# Patient Record
Sex: MALE | Race: WHITE | Employment: OTHER | URBAN - METROPOLITAN AREA
[De-identification: names, ages, dates, MRNs, and addresses within clinical notes are randomized per-mention and may not be internally consistent; named-entity substitution may affect disease eponyms.]

---

## 2017-01-01 ENCOUNTER — INFUSION THERAPY VISIT (OUTPATIENT)
Dept: ONCOLOGY | Facility: CLINIC | Age: 82
End: 2017-01-01
Attending: INTERNAL MEDICINE
Payer: COMMERCIAL

## 2017-01-01 ENCOUNTER — APPOINTMENT (OUTPATIENT)
Dept: LAB | Facility: CLINIC | Age: 82
End: 2017-01-01
Attending: INTERNAL MEDICINE
Payer: COMMERCIAL

## 2017-01-01 ENCOUNTER — INFUSION THERAPY VISIT (OUTPATIENT)
Dept: ONCOLOGY | Facility: CLINIC | Age: 82
End: 2017-01-01
Attending: PHYSICIAN ASSISTANT
Payer: COMMERCIAL

## 2017-01-01 ENCOUNTER — TELEPHONE (OUTPATIENT)
Dept: DERMATOLOGY | Facility: CLINIC | Age: 82
End: 2017-01-01

## 2017-01-01 ENCOUNTER — TELEPHONE (OUTPATIENT)
Dept: TRANSPLANT | Facility: CLINIC | Age: 82
End: 2017-01-01

## 2017-01-01 ENCOUNTER — CARE COORDINATION (OUTPATIENT)
Dept: ONCOLOGY | Facility: CLINIC | Age: 82
End: 2017-01-01

## 2017-01-01 ENCOUNTER — ONCOLOGY VISIT (OUTPATIENT)
Dept: ONCOLOGY | Facility: CLINIC | Age: 82
End: 2017-01-01
Attending: NURSE PRACTITIONER
Payer: COMMERCIAL

## 2017-01-01 ENCOUNTER — TELEPHONE (OUTPATIENT)
Dept: ONCOLOGY | Facility: CLINIC | Age: 82
End: 2017-01-01

## 2017-01-01 ENCOUNTER — OFFICE VISIT (OUTPATIENT)
Dept: DERMATOLOGY | Facility: CLINIC | Age: 82
End: 2017-01-01

## 2017-01-01 ENCOUNTER — APPOINTMENT (OUTPATIENT)
Dept: LAB | Facility: CLINIC | Age: 82
End: 2017-01-01
Attending: PHYSICIAN ASSISTANT
Payer: COMMERCIAL

## 2017-01-01 VITALS
TEMPERATURE: 98.1 F | RESPIRATION RATE: 16 BRPM | DIASTOLIC BLOOD PRESSURE: 65 MMHG | SYSTOLIC BLOOD PRESSURE: 133 MMHG | HEART RATE: 79 BPM | BODY MASS INDEX: 23.67 KG/M2 | OXYGEN SATURATION: 95 % | WEIGHT: 146.6 LBS

## 2017-01-01 VITALS
RESPIRATION RATE: 18 BRPM | TEMPERATURE: 98.3 F | HEIGHT: 66 IN | OXYGEN SATURATION: 94 % | HEART RATE: 91 BPM | DIASTOLIC BLOOD PRESSURE: 61 MMHG | BODY MASS INDEX: 23.74 KG/M2 | SYSTOLIC BLOOD PRESSURE: 142 MMHG | WEIGHT: 147.71 LBS

## 2017-01-01 VITALS
HEIGHT: 66 IN | HEART RATE: 64 BPM | BODY MASS INDEX: 23.88 KG/M2 | WEIGHT: 148.6 LBS | OXYGEN SATURATION: 97 % | DIASTOLIC BLOOD PRESSURE: 65 MMHG | TEMPERATURE: 97.9 F | SYSTOLIC BLOOD PRESSURE: 139 MMHG

## 2017-01-01 VITALS
TEMPERATURE: 98.7 F | HEART RATE: 74 BPM | DIASTOLIC BLOOD PRESSURE: 70 MMHG | BODY MASS INDEX: 23.98 KG/M2 | RESPIRATION RATE: 18 BRPM | SYSTOLIC BLOOD PRESSURE: 147 MMHG | OXYGEN SATURATION: 98 % | WEIGHT: 148.5 LBS

## 2017-01-01 VITALS
OXYGEN SATURATION: 98 % | SYSTOLIC BLOOD PRESSURE: 159 MMHG | WEIGHT: 149.1 LBS | TEMPERATURE: 98.5 F | DIASTOLIC BLOOD PRESSURE: 79 MMHG | HEART RATE: 95 BPM | BODY MASS INDEX: 23.96 KG/M2 | RESPIRATION RATE: 16 BRPM | HEIGHT: 66 IN

## 2017-01-01 VITALS
WEIGHT: 149 LBS | DIASTOLIC BLOOD PRESSURE: 59 MMHG | BODY MASS INDEX: 23.95 KG/M2 | OXYGEN SATURATION: 97 % | SYSTOLIC BLOOD PRESSURE: 120 MMHG | HEIGHT: 66 IN | HEART RATE: 63 BPM | RESPIRATION RATE: 12 BRPM | TEMPERATURE: 98.6 F

## 2017-01-01 VITALS
OXYGEN SATURATION: 98 % | WEIGHT: 147.9 LBS | SYSTOLIC BLOOD PRESSURE: 133 MMHG | TEMPERATURE: 98.2 F | DIASTOLIC BLOOD PRESSURE: 79 MMHG | HEART RATE: 70 BPM | HEIGHT: 66 IN | BODY MASS INDEX: 23.77 KG/M2 | RESPIRATION RATE: 16 BRPM

## 2017-01-01 VITALS — HEART RATE: 79 BPM | DIASTOLIC BLOOD PRESSURE: 70 MMHG | SYSTOLIC BLOOD PRESSURE: 124 MMHG

## 2017-01-01 VITALS
RESPIRATION RATE: 16 BRPM | OXYGEN SATURATION: 95 % | BODY MASS INDEX: 24.14 KG/M2 | SYSTOLIC BLOOD PRESSURE: 150 MMHG | DIASTOLIC BLOOD PRESSURE: 70 MMHG | HEART RATE: 80 BPM | WEIGHT: 149.5 LBS | TEMPERATURE: 99.4 F

## 2017-01-01 DIAGNOSIS — C84.70 ANAPLASTIC ALK-NEGATIVE LARGE CELL LYMPHOMA, UNSPECIFIED BODY REGION (H): Primary | ICD-10-CM

## 2017-01-01 DIAGNOSIS — C85.99 EXTRANODAL LYMPHOMA (H): Primary | ICD-10-CM

## 2017-01-01 DIAGNOSIS — C85.99 EXTRANODAL LYMPHOMA (H): ICD-10-CM

## 2017-01-01 DIAGNOSIS — C84.00 MYCOSIS FUNGOIDES, UNSPECIFIED BODY REGION (H): Primary | ICD-10-CM

## 2017-01-01 DIAGNOSIS — I49.9 IRREGULAR HEARTBEAT: ICD-10-CM

## 2017-01-01 DIAGNOSIS — D48.5 NEOPLASM OF UNCERTAIN BEHAVIOR OF SKIN: Primary | ICD-10-CM

## 2017-01-01 DIAGNOSIS — C84.A8 CUTANEOUS T-CELL LYMPHOMA INVOLVING LYMPH NODES OF MULTIPLE REGIONS (H): ICD-10-CM

## 2017-01-01 LAB
ALBUMIN SERPL ELPH-MCNC: 4.1 G/DL (ref 3.7–5.1)
ALBUMIN SERPL-MCNC: 3.4 G/DL (ref 3.4–5)
ALBUMIN SERPL-MCNC: 3.5 G/DL (ref 3.4–5)
ALBUMIN SERPL-MCNC: 3.6 G/DL (ref 3.4–5)
ALP SERPL-CCNC: 68 U/L (ref 40–150)
ALP SERPL-CCNC: 68 U/L (ref 40–150)
ALP SERPL-CCNC: 70 U/L (ref 40–150)
ALPHA1 GLOB SERPL ELPH-MCNC: 0.3 G/DL (ref 0.2–0.4)
ALPHA2 GLOB SERPL ELPH-MCNC: 0.7 G/DL (ref 0.5–0.9)
ALT SERPL W P-5'-P-CCNC: 18 U/L (ref 0–70)
ALT SERPL W P-5'-P-CCNC: 19 U/L (ref 0–70)
ALT SERPL W P-5'-P-CCNC: 21 U/L (ref 0–70)
ANION GAP SERPL CALCULATED.3IONS-SCNC: 11 MMOL/L (ref 3–14)
ANION GAP SERPL CALCULATED.3IONS-SCNC: 5 MMOL/L (ref 3–14)
ANION GAP SERPL CALCULATED.3IONS-SCNC: 6 MMOL/L (ref 3–14)
ANION GAP SERPL CALCULATED.3IONS-SCNC: 7 MMOL/L (ref 3–14)
ANION GAP SERPL CALCULATED.3IONS-SCNC: 7 MMOL/L (ref 3–14)
ANION GAP SERPL CALCULATED.3IONS-SCNC: 8 MMOL/L (ref 3–14)
ANION GAP SERPL CALCULATED.3IONS-SCNC: 8 MMOL/L (ref 3–14)
ANION GAP SERPL CALCULATED.3IONS-SCNC: 9 MMOL/L (ref 3–14)
ANION GAP SERPL CALCULATED.3IONS-SCNC: 9 MMOL/L (ref 3–14)
AST SERPL W P-5'-P-CCNC: 14 U/L (ref 0–45)
AST SERPL W P-5'-P-CCNC: 15 U/L (ref 0–45)
AST SERPL W P-5'-P-CCNC: 16 U/L (ref 0–45)
B-GLOBULIN SERPL ELPH-MCNC: 0.7 G/DL (ref 0.6–1)
BASOPHILS # BLD AUTO: 0 10E9/L (ref 0–0.2)
BASOPHILS # BLD AUTO: 0.1 10E9/L (ref 0–0.2)
BASOPHILS NFR BLD AUTO: 0.3 %
BASOPHILS NFR BLD AUTO: 0.3 %
BASOPHILS NFR BLD AUTO: 0.4 %
BASOPHILS NFR BLD AUTO: 0.5 %
BASOPHILS NFR BLD AUTO: 0.5 %
BASOPHILS NFR BLD AUTO: 0.6 %
BASOPHILS NFR BLD AUTO: 1.3 %
BILIRUB SERPL-MCNC: 0.5 MG/DL (ref 0.2–1.3)
BILIRUB SERPL-MCNC: 0.6 MG/DL (ref 0.2–1.3)
BILIRUB SERPL-MCNC: 0.6 MG/DL (ref 0.2–1.3)
BUN SERPL-MCNC: 11 MG/DL (ref 7–30)
BUN SERPL-MCNC: 14 MG/DL (ref 7–30)
BUN SERPL-MCNC: 16 MG/DL (ref 7–30)
BUN SERPL-MCNC: 17 MG/DL (ref 7–30)
BUN SERPL-MCNC: 21 MG/DL (ref 7–30)
BUN SERPL-MCNC: 22 MG/DL (ref 7–30)
CALCIUM SERPL-MCNC: 8.4 MG/DL (ref 8.5–10.1)
CALCIUM SERPL-MCNC: 8.4 MG/DL (ref 8.5–10.1)
CALCIUM SERPL-MCNC: 8.5 MG/DL (ref 8.5–10.1)
CALCIUM SERPL-MCNC: 8.6 MG/DL (ref 8.5–10.1)
CALCIUM SERPL-MCNC: 8.7 MG/DL (ref 8.5–10.1)
CALCIUM SERPL-MCNC: 8.8 MG/DL (ref 8.5–10.1)
CALCIUM SERPL-MCNC: 8.9 MG/DL (ref 8.5–10.1)
CHLORIDE SERPL-SCNC: 104 MMOL/L (ref 94–109)
CHLORIDE SERPL-SCNC: 104 MMOL/L (ref 94–109)
CHLORIDE SERPL-SCNC: 106 MMOL/L (ref 94–109)
CHLORIDE SERPL-SCNC: 107 MMOL/L (ref 94–109)
CHLORIDE SERPL-SCNC: 108 MMOL/L (ref 94–109)
CHLORIDE SERPL-SCNC: 108 MMOL/L (ref 94–109)
CHLORIDE SERPL-SCNC: 109 MMOL/L (ref 94–109)
CO2 SERPL-SCNC: 24 MMOL/L (ref 20–32)
CO2 SERPL-SCNC: 24 MMOL/L (ref 20–32)
CO2 SERPL-SCNC: 25 MMOL/L (ref 20–32)
CO2 SERPL-SCNC: 25 MMOL/L (ref 20–32)
CO2 SERPL-SCNC: 26 MMOL/L (ref 20–32)
CO2 SERPL-SCNC: 26 MMOL/L (ref 20–32)
CO2 SERPL-SCNC: 27 MMOL/L (ref 20–32)
CO2 SERPL-SCNC: 27 MMOL/L (ref 20–32)
CO2 SERPL-SCNC: 30 MMOL/L (ref 20–32)
COPATH REPORT: NORMAL
COPATH REPORT: NORMAL
CREAT SERPL-MCNC: 0.84 MG/DL (ref 0.66–1.25)
CREAT SERPL-MCNC: 0.89 MG/DL (ref 0.66–1.25)
CREAT SERPL-MCNC: 0.89 MG/DL (ref 0.66–1.25)
CREAT SERPL-MCNC: 0.92 MG/DL (ref 0.66–1.25)
CREAT SERPL-MCNC: 0.93 MG/DL (ref 0.66–1.25)
CREAT SERPL-MCNC: 0.97 MG/DL (ref 0.66–1.25)
CREAT SERPL-MCNC: 0.98 MG/DL (ref 0.66–1.25)
CREAT SERPL-MCNC: 0.98 MG/DL (ref 0.66–1.25)
CREAT SERPL-MCNC: 1.01 MG/DL (ref 0.66–1.25)
DIFFERENTIAL METHOD BLD: ABNORMAL
DIFFERENTIAL METHOD BLD: NORMAL
EOSINOPHIL # BLD AUTO: 0 10E9/L (ref 0–0.7)
EOSINOPHIL # BLD AUTO: 0.1 10E9/L (ref 0–0.7)
EOSINOPHIL # BLD AUTO: 0.2 10E9/L (ref 0–0.7)
EOSINOPHIL # BLD AUTO: 0.3 10E9/L (ref 0–0.7)
EOSINOPHIL NFR BLD AUTO: 0.3 %
EOSINOPHIL NFR BLD AUTO: 0.3 %
EOSINOPHIL NFR BLD AUTO: 0.4 %
EOSINOPHIL NFR BLD AUTO: 0.5 %
EOSINOPHIL NFR BLD AUTO: 0.6 %
EOSINOPHIL NFR BLD AUTO: 1 %
EOSINOPHIL NFR BLD AUTO: 1 %
EOSINOPHIL NFR BLD AUTO: 2.6 %
EOSINOPHIL NFR BLD AUTO: 2.9 %
ERYTHROCYTE [DISTWIDTH] IN BLOOD BY AUTOMATED COUNT: 14.7 % (ref 10–15)
ERYTHROCYTE [DISTWIDTH] IN BLOOD BY AUTOMATED COUNT: 15.4 % (ref 10–15)
ERYTHROCYTE [DISTWIDTH] IN BLOOD BY AUTOMATED COUNT: 15.7 % (ref 10–15)
ERYTHROCYTE [DISTWIDTH] IN BLOOD BY AUTOMATED COUNT: 15.8 % (ref 10–15)
ERYTHROCYTE [DISTWIDTH] IN BLOOD BY AUTOMATED COUNT: 16 % (ref 10–15)
ERYTHROCYTE [DISTWIDTH] IN BLOOD BY AUTOMATED COUNT: 17.4 % (ref 10–15)
ERYTHROCYTE [DISTWIDTH] IN BLOOD BY AUTOMATED COUNT: 17.6 % (ref 10–15)
ERYTHROCYTE [DISTWIDTH] IN BLOOD BY AUTOMATED COUNT: 17.8 % (ref 10–15)
ERYTHROCYTE [DISTWIDTH] IN BLOOD BY AUTOMATED COUNT: 18.4 % (ref 10–15)
GAMMA GLOB SERPL ELPH-MCNC: 1 G/DL (ref 0.7–1.6)
GFR SERPL CREATININE-BSD FRML MDRD: 70 ML/MIN/1.7M2
GFR SERPL CREATININE-BSD FRML MDRD: 72 ML/MIN/1.7M2
GFR SERPL CREATININE-BSD FRML MDRD: 72 ML/MIN/1.7M2
GFR SERPL CREATININE-BSD FRML MDRD: 73 ML/MIN/1.7M2
GFR SERPL CREATININE-BSD FRML MDRD: 77 ML/MIN/1.7M2
GFR SERPL CREATININE-BSD FRML MDRD: 77 ML/MIN/1.7M2
GFR SERPL CREATININE-BSD FRML MDRD: 81 ML/MIN/1.7M2
GFR SERPL CREATININE-BSD FRML MDRD: 81 ML/MIN/1.7M2
GFR SERPL CREATININE-BSD FRML MDRD: 86 ML/MIN/1.7M2
GLUCOSE SERPL-MCNC: 104 MG/DL (ref 70–99)
GLUCOSE SERPL-MCNC: 123 MG/DL (ref 70–99)
GLUCOSE SERPL-MCNC: 130 MG/DL (ref 70–99)
GLUCOSE SERPL-MCNC: 134 MG/DL (ref 70–99)
GLUCOSE SERPL-MCNC: 138 MG/DL (ref 70–99)
GLUCOSE SERPL-MCNC: 170 MG/DL (ref 70–99)
GLUCOSE SERPL-MCNC: 176 MG/DL (ref 70–99)
GLUCOSE SERPL-MCNC: 177 MG/DL (ref 70–99)
GLUCOSE SERPL-MCNC: 199 MG/DL (ref 70–99)
HCT VFR BLD AUTO: 38.1 % (ref 40–53)
HCT VFR BLD AUTO: 39.4 % (ref 40–53)
HCT VFR BLD AUTO: 39.6 % (ref 40–53)
HCT VFR BLD AUTO: 40 % (ref 40–53)
HCT VFR BLD AUTO: 40.1 % (ref 40–53)
HCT VFR BLD AUTO: 40.3 % (ref 40–53)
HCT VFR BLD AUTO: 40.5 % (ref 40–53)
HCT VFR BLD AUTO: 41.5 % (ref 40–53)
HCT VFR BLD AUTO: 42.7 % (ref 40–53)
HGB BLD-MCNC: 12.1 G/DL (ref 13.3–17.7)
HGB BLD-MCNC: 12.7 G/DL (ref 13.3–17.7)
HGB BLD-MCNC: 13 G/DL (ref 13.3–17.7)
HGB BLD-MCNC: 13.1 G/DL (ref 13.3–17.7)
HGB BLD-MCNC: 13.4 G/DL (ref 13.3–17.7)
HGB BLD-MCNC: 13.4 G/DL (ref 13.3–17.7)
HGB BLD-MCNC: 13.7 G/DL (ref 13.3–17.7)
HGB BLD-MCNC: 14.2 G/DL (ref 13.3–17.7)
HGB BLD-MCNC: 14.3 G/DL (ref 13.3–17.7)
IMM GRANULOCYTES # BLD: 0 10E9/L (ref 0–0.4)
IMM GRANULOCYTES # BLD: 0.1 10E9/L (ref 0–0.4)
IMM GRANULOCYTES NFR BLD: 0.1 %
IMM GRANULOCYTES NFR BLD: 0.2 %
IMM GRANULOCYTES NFR BLD: 0.3 %
IMM GRANULOCYTES NFR BLD: 0.4 %
IMM GRANULOCYTES NFR BLD: 0.8 %
INTERPRETATION ECG - MUSE: NORMAL
LDH SERPL L TO P-CCNC: 205 U/L (ref 85–227)
LDH SERPL L TO P-CCNC: 208 U/L (ref 85–227)
LYMPHOCYTES # BLD AUTO: 0.8 10E9/L (ref 0.8–5.3)
LYMPHOCYTES # BLD AUTO: 0.8 10E9/L (ref 0.8–5.3)
LYMPHOCYTES # BLD AUTO: 0.9 10E9/L (ref 0.8–5.3)
LYMPHOCYTES # BLD AUTO: 0.9 10E9/L (ref 0.8–5.3)
LYMPHOCYTES # BLD AUTO: 1 10E9/L (ref 0.8–5.3)
LYMPHOCYTES # BLD AUTO: 1 10E9/L (ref 0.8–5.3)
LYMPHOCYTES # BLD AUTO: 1.3 10E9/L (ref 0.8–5.3)
LYMPHOCYTES # BLD AUTO: 1.3 10E9/L (ref 0.8–5.3)
LYMPHOCYTES # BLD AUTO: 1.4 10E9/L (ref 0.8–5.3)
LYMPHOCYTES NFR BLD AUTO: 10.4 %
LYMPHOCYTES NFR BLD AUTO: 11 %
LYMPHOCYTES NFR BLD AUTO: 13.8 %
LYMPHOCYTES NFR BLD AUTO: 14 %
LYMPHOCYTES NFR BLD AUTO: 20.3 %
LYMPHOCYTES NFR BLD AUTO: 9.6 %
LYMPHOCYTES NFR BLD AUTO: 9.7 %
LYMPHOCYTES NFR BLD AUTO: 9.9 %
LYMPHOCYTES NFR BLD AUTO: 9.9 %
M PROTEIN SERPL ELPH-MCNC: 0 G/DL
MAGNESIUM SERPL-MCNC: 2.5 MG/DL (ref 1.6–2.3)
MCH RBC QN AUTO: 28.1 PG (ref 26.5–33)
MCH RBC QN AUTO: 28.3 PG (ref 26.5–33)
MCH RBC QN AUTO: 28.6 PG (ref 26.5–33)
MCH RBC QN AUTO: 28.7 PG (ref 26.5–33)
MCH RBC QN AUTO: 29.3 PG (ref 26.5–33)
MCH RBC QN AUTO: 29.6 PG (ref 26.5–33)
MCH RBC QN AUTO: 30 PG (ref 26.5–33)
MCH RBC QN AUTO: 30.1 PG (ref 26.5–33)
MCH RBC QN AUTO: 30.6 PG (ref 26.5–33)
MCHC RBC AUTO-ENTMCNC: 31.8 G/DL (ref 31.5–36.5)
MCHC RBC AUTO-ENTMCNC: 32.2 G/DL (ref 31.5–36.5)
MCHC RBC AUTO-ENTMCNC: 32.4 G/DL (ref 31.5–36.5)
MCHC RBC AUTO-ENTMCNC: 32.5 G/DL (ref 31.5–36.5)
MCHC RBC AUTO-ENTMCNC: 33.3 G/DL (ref 31.5–36.5)
MCHC RBC AUTO-ENTMCNC: 33.5 G/DL (ref 31.5–36.5)
MCHC RBC AUTO-ENTMCNC: 33.8 G/DL (ref 31.5–36.5)
MCHC RBC AUTO-ENTMCNC: 33.8 G/DL (ref 31.5–36.5)
MCHC RBC AUTO-ENTMCNC: 34.5 G/DL (ref 31.5–36.5)
MCV RBC AUTO: 87 FL (ref 78–100)
MCV RBC AUTO: 88 FL (ref 78–100)
MCV RBC AUTO: 89 FL (ref 78–100)
MONOCYTES # BLD AUTO: 0.5 10E9/L (ref 0–1.3)
MONOCYTES # BLD AUTO: 0.6 10E9/L (ref 0–1.3)
MONOCYTES # BLD AUTO: 0.6 10E9/L (ref 0–1.3)
MONOCYTES # BLD AUTO: 0.7 10E9/L (ref 0–1.3)
MONOCYTES NFR BLD AUTO: 5.3 %
MONOCYTES NFR BLD AUTO: 5.8 %
MONOCYTES NFR BLD AUTO: 6.3 %
MONOCYTES NFR BLD AUTO: 6.8 %
MONOCYTES NFR BLD AUTO: 6.9 %
MONOCYTES NFR BLD AUTO: 7.8 %
MONOCYTES NFR BLD AUTO: 7.8 %
MONOCYTES NFR BLD AUTO: 8.7 %
MONOCYTES NFR BLD AUTO: 8.9 %
NEUTROPHILS # BLD AUTO: 4.9 10E9/L (ref 1.6–8.3)
NEUTROPHILS # BLD AUTO: 5.4 10E9/L (ref 1.6–8.3)
NEUTROPHILS # BLD AUTO: 6.4 10E9/L (ref 1.6–8.3)
NEUTROPHILS # BLD AUTO: 6.5 10E9/L (ref 1.6–8.3)
NEUTROPHILS # BLD AUTO: 7.2 10E9/L (ref 1.6–8.3)
NEUTROPHILS # BLD AUTO: 7.3 10E9/L (ref 1.6–8.3)
NEUTROPHILS # BLD AUTO: 7.9 10E9/L (ref 1.6–8.3)
NEUTROPHILS # BLD AUTO: 8 10E9/L (ref 1.6–8.3)
NEUTROPHILS # BLD AUTO: 9.4 10E9/L (ref 1.6–8.3)
NEUTROPHILS NFR BLD AUTO: 69.4 %
NEUTROPHILS NFR BLD AUTO: 72.4 %
NEUTROPHILS NFR BLD AUTO: 76.7 %
NEUTROPHILS NFR BLD AUTO: 79.9 %
NEUTROPHILS NFR BLD AUTO: 81.8 %
NEUTROPHILS NFR BLD AUTO: 81.8 %
NEUTROPHILS NFR BLD AUTO: 82.4 %
NEUTROPHILS NFR BLD AUTO: 82.4 %
NEUTROPHILS NFR BLD AUTO: 83.8 %
NRBC # BLD AUTO: 0 10*3/UL
NRBC BLD AUTO-RTO: 0 /100
PHOSPHATE SERPL-MCNC: 2.3 MG/DL (ref 2.5–4.5)
PLATELET # BLD AUTO: 185 10E9/L (ref 150–450)
PLATELET # BLD AUTO: 196 10E9/L (ref 150–450)
PLATELET # BLD AUTO: 202 10E9/L (ref 150–450)
PLATELET # BLD AUTO: 211 10E9/L (ref 150–450)
PLATELET # BLD AUTO: 214 10E9/L (ref 150–450)
PLATELET # BLD AUTO: 255 10E9/L (ref 150–450)
PLATELET # BLD AUTO: 282 10E9/L (ref 150–450)
PLATELET # BLD AUTO: 353 10E9/L (ref 150–450)
PLATELET # BLD AUTO: 367 10E9/L (ref 150–450)
POTASSIUM SERPL-SCNC: 3.7 MMOL/L (ref 3.4–5.3)
POTASSIUM SERPL-SCNC: 3.7 MMOL/L (ref 3.4–5.3)
POTASSIUM SERPL-SCNC: 3.8 MMOL/L (ref 3.4–5.3)
POTASSIUM SERPL-SCNC: 3.8 MMOL/L (ref 3.4–5.3)
POTASSIUM SERPL-SCNC: 4 MMOL/L (ref 3.4–5.3)
POTASSIUM SERPL-SCNC: 4 MMOL/L (ref 3.4–5.3)
POTASSIUM SERPL-SCNC: 4.1 MMOL/L (ref 3.4–5.3)
POTASSIUM SERPL-SCNC: 4.2 MMOL/L (ref 3.4–5.3)
POTASSIUM SERPL-SCNC: 4.4 MMOL/L (ref 3.4–5.3)
PROT PATTERN SERPL ELPH-IMP: NORMAL
PROT SERPL-MCNC: 6.6 G/DL (ref 6.8–8.8)
PROT SERPL-MCNC: 6.9 G/DL (ref 6.8–8.8)
PROT SERPL-MCNC: 7.1 G/DL (ref 6.8–8.8)
RBC # BLD AUTO: 4.3 10E12/L (ref 4.4–5.9)
RBC # BLD AUTO: 4.47 10E12/L (ref 4.4–5.9)
RBC # BLD AUTO: 4.49 10E12/L (ref 4.4–5.9)
RBC # BLD AUTO: 4.55 10E12/L (ref 4.4–5.9)
RBC # BLD AUTO: 4.55 10E12/L (ref 4.4–5.9)
RBC # BLD AUTO: 4.56 10E12/L (ref 4.4–5.9)
RBC # BLD AUTO: 4.58 10E12/L (ref 4.4–5.9)
RBC # BLD AUTO: 4.67 10E12/L (ref 4.4–5.9)
RBC # BLD AUTO: 4.79 10E12/L (ref 4.4–5.9)
SODIUM SERPL-SCNC: 136 MMOL/L (ref 133–144)
SODIUM SERPL-SCNC: 138 MMOL/L (ref 133–144)
SODIUM SERPL-SCNC: 140 MMOL/L (ref 133–144)
SODIUM SERPL-SCNC: 140 MMOL/L (ref 133–144)
SODIUM SERPL-SCNC: 142 MMOL/L (ref 133–144)
SODIUM SERPL-SCNC: 143 MMOL/L (ref 133–144)
URATE SERPL-MCNC: 3 MG/DL (ref 3.5–7.2)
URATE SERPL-MCNC: 3.8 MG/DL (ref 3.5–7.2)
WBC # BLD AUTO: 11.8 10E9/L (ref 4–11)
WBC # BLD AUTO: 7 10E9/L (ref 4–11)
WBC # BLD AUTO: 7.4 10E9/L (ref 4–11)
WBC # BLD AUTO: 7.8 10E9/L (ref 4–11)
WBC # BLD AUTO: 7.9 10E9/L (ref 4–11)
WBC # BLD AUTO: 8.9 10E9/L (ref 4–11)
WBC # BLD AUTO: 9.4 10E9/L (ref 4–11)
WBC # BLD AUTO: 9.4 10E9/L (ref 4–11)
WBC # BLD AUTO: 9.7 10E9/L (ref 4–11)

## 2017-01-01 PROCEDURE — 00000159 ZZHCL STATISTIC H-SEND OUTS PREP: Performed by: DERMATOLOGY

## 2017-01-01 PROCEDURE — 99212 OFFICE O/P EST SF 10 MIN: CPT | Mod: ZF

## 2017-01-01 PROCEDURE — 93005 ELECTROCARDIOGRAM TRACING: CPT | Mod: ZF

## 2017-01-01 PROCEDURE — 99214 OFFICE O/P EST MOD 30 MIN: CPT | Mod: GC | Performed by: INTERNAL MEDICINE

## 2017-01-01 PROCEDURE — 83735 ASSAY OF MAGNESIUM: CPT | Performed by: INTERNAL MEDICINE

## 2017-01-01 PROCEDURE — 85025 COMPLETE CBC W/AUTO DIFF WBC: CPT | Performed by: PHYSICIAN ASSISTANT

## 2017-01-01 PROCEDURE — 36415 COLL VENOUS BLD VENIPUNCTURE: CPT | Performed by: INTERNAL MEDICINE

## 2017-01-01 PROCEDURE — 36415 COLL VENOUS BLD VENIPUNCTURE: CPT

## 2017-01-01 PROCEDURE — 85025 COMPLETE CBC W/AUTO DIFF WBC: CPT | Performed by: INTERNAL MEDICINE

## 2017-01-01 PROCEDURE — 96413 CHEMO IV INFUSION 1 HR: CPT

## 2017-01-01 PROCEDURE — 99213 OFFICE O/P EST LOW 20 MIN: CPT | Mod: ZF

## 2017-01-01 PROCEDURE — 84100 ASSAY OF PHOSPHORUS: CPT | Performed by: INTERNAL MEDICINE

## 2017-01-01 PROCEDURE — 25000128 H RX IP 250 OP 636: Mod: JW,ZF | Performed by: INTERNAL MEDICINE

## 2017-01-01 PROCEDURE — 84165 PROTEIN E-PHORESIS SERUM: CPT | Performed by: INTERNAL MEDICINE

## 2017-01-01 PROCEDURE — 99212 OFFICE O/P EST SF 10 MIN: CPT

## 2017-01-01 PROCEDURE — 99215 OFFICE O/P EST HI 40 MIN: CPT | Mod: ZP | Performed by: INTERNAL MEDICINE

## 2017-01-01 PROCEDURE — 83615 LACTATE (LD) (LDH) ENZYME: CPT | Performed by: INTERNAL MEDICINE

## 2017-01-01 PROCEDURE — 80048 BASIC METABOLIC PNL TOTAL CA: CPT | Performed by: PHYSICIAN ASSISTANT

## 2017-01-01 PROCEDURE — 99214 OFFICE O/P EST MOD 30 MIN: CPT | Mod: ZP | Performed by: PHYSICIAN ASSISTANT

## 2017-01-01 PROCEDURE — 99214 OFFICE O/P EST MOD 30 MIN: CPT | Mod: ZP | Performed by: NURSE PRACTITIONER

## 2017-01-01 PROCEDURE — 25000128 H RX IP 250 OP 636: Mod: ZF | Performed by: NURSE PRACTITIONER

## 2017-01-01 PROCEDURE — 99213 OFFICE O/P EST LOW 20 MIN: CPT | Mod: ZP | Performed by: INTERNAL MEDICINE

## 2017-01-01 PROCEDURE — 80053 COMPREHEN METABOLIC PANEL: CPT | Performed by: INTERNAL MEDICINE

## 2017-01-01 PROCEDURE — 80048 BASIC METABOLIC PNL TOTAL CA: CPT | Performed by: INTERNAL MEDICINE

## 2017-01-01 PROCEDURE — 99214 OFFICE O/P EST MOD 30 MIN: CPT | Mod: 25

## 2017-01-01 PROCEDURE — 84550 ASSAY OF BLOOD/URIC ACID: CPT | Performed by: INTERNAL MEDICINE

## 2017-01-01 PROCEDURE — 25000128 H RX IP 250 OP 636: Mod: JW,ZF | Performed by: PHYSICIAN ASSISTANT

## 2017-01-01 PROCEDURE — 85025 COMPLETE CBC W/AUTO DIFF WBC: CPT | Performed by: FAMILY MEDICINE

## 2017-01-01 PROCEDURE — 25000128 H RX IP 250 OP 636: Mod: ZF | Performed by: INTERNAL MEDICINE

## 2017-01-01 PROCEDURE — 93010 ELECTROCARDIOGRAM REPORT: CPT | Mod: ZP | Performed by: INTERNAL MEDICINE

## 2017-01-01 PROCEDURE — 88342 IMHCHEM/IMCYTCHM 1ST ANTB: CPT | Performed by: DERMATOLOGY

## 2017-01-01 PROCEDURE — 36415 COLL VENOUS BLD VENIPUNCTURE: CPT | Performed by: PHYSICIAN ASSISTANT

## 2017-01-01 PROCEDURE — 88305 TISSUE EXAM BY PATHOLOGIST: CPT | Performed by: DERMATOLOGY

## 2017-01-01 PROCEDURE — 88341 IMHCHEM/IMCYTCHM EA ADD ANTB: CPT | Performed by: DERMATOLOGY

## 2017-01-01 PROCEDURE — 81342 TRG GENE REARRANGEMENT ANAL: CPT | Performed by: DERMATOLOGY

## 2017-01-01 PROCEDURE — 99214 OFFICE O/P EST MOD 30 MIN: CPT | Mod: ZP | Performed by: INTERNAL MEDICINE

## 2017-01-01 PROCEDURE — 00000402 ZZHCL STATISTIC TOTAL PROTEIN: Performed by: INTERNAL MEDICINE

## 2017-01-01 RX ORDER — EPINEPHRINE 0.3 MG/.3ML
0.3 INJECTION SUBCUTANEOUS EVERY 5 MIN PRN
Status: CANCELLED | OUTPATIENT
Start: 2017-01-01

## 2017-01-01 RX ORDER — DIPHENHYDRAMINE HCL 25 MG
50 CAPSULE ORAL
Status: CANCELLED
Start: 2017-01-01

## 2017-01-01 RX ORDER — VENLAFAXINE HYDROCHLORIDE 37.5 MG/1
CAPSULE, EXTENDED RELEASE ORAL
COMMUNITY
Start: 2017-01-01

## 2017-01-01 RX ORDER — ACETAMINOPHEN 325 MG/1
650 TABLET ORAL
Status: CANCELLED
Start: 2017-01-01

## 2017-01-01 RX ORDER — ALBUTEROL SULFATE 0.83 MG/ML
2.5 SOLUTION RESPIRATORY (INHALATION)
Status: CANCELLED | OUTPATIENT
Start: 2017-01-01

## 2017-01-01 RX ORDER — METHYLPREDNISOLONE SODIUM SUCCINATE 125 MG/2ML
125 INJECTION, POWDER, LYOPHILIZED, FOR SOLUTION INTRAMUSCULAR; INTRAVENOUS
Status: CANCELLED
Start: 2017-01-01

## 2017-01-01 RX ORDER — ALBUTEROL SULFATE 90 UG/1
1-2 AEROSOL, METERED RESPIRATORY (INHALATION)
Status: CANCELLED
Start: 2017-01-01

## 2017-01-01 RX ORDER — MEPERIDINE HYDROCHLORIDE 25 MG/ML
25 INJECTION INTRAMUSCULAR; INTRAVENOUS; SUBCUTANEOUS EVERY 30 MIN PRN
Status: CANCELLED | OUTPATIENT
Start: 2017-01-01

## 2017-01-01 RX ORDER — LORAZEPAM 2 MG/ML
0.5 INJECTION INTRAMUSCULAR EVERY 4 HOURS PRN
Status: CANCELLED
Start: 2017-01-01

## 2017-01-01 RX ORDER — DIPHENHYDRAMINE HYDROCHLORIDE 50 MG/ML
50 INJECTION INTRAMUSCULAR; INTRAVENOUS
Status: CANCELLED
Start: 2017-01-01

## 2017-01-01 RX ORDER — SODIUM CHLORIDE 9 MG/ML
1000 INJECTION, SOLUTION INTRAVENOUS CONTINUOUS PRN
Status: CANCELLED
Start: 2017-01-01

## 2017-01-01 RX ORDER — EPINEPHRINE 1 MG/ML
0.3 INJECTION INTRAMUSCULAR; INTRAVENOUS; SUBCUTANEOUS EVERY 5 MIN PRN
Status: CANCELLED | OUTPATIENT
Start: 2017-01-01

## 2017-01-01 RX ORDER — FLUOCINONIDE 0.5 MG/G
CREAM TOPICAL 2 TIMES DAILY
Qty: 120 G | Refills: 5 | Status: SHIPPED | OUTPATIENT
Start: 2017-01-01

## 2017-01-01 RX ADMIN — SODIUM CHLORIDE 250 ML: 9 INJECTION, SOLUTION INTRAVENOUS at 16:22

## 2017-01-01 RX ADMIN — SODIUM CHLORIDE 250 ML: 9 INJECTION, SOLUTION INTRAVENOUS at 15:45

## 2017-01-01 RX ADMIN — SODIUM CHLORIDE 250 ML: 9 INJECTION, SOLUTION INTRAVENOUS at 15:11

## 2017-01-01 RX ADMIN — BRENTUXIMAB VEDOTIN 123 MG: 50 INJECTION, POWDER, LYOPHILIZED, FOR SOLUTION INTRAVENOUS at 15:27

## 2017-01-01 RX ADMIN — SODIUM CHLORIDE 250 ML: 9 INJECTION, SOLUTION INTRAVENOUS at 15:28

## 2017-01-01 RX ADMIN — BRENTUXIMAB VEDOTIN 123 MG: 50 INJECTION, POWDER, LYOPHILIZED, FOR SOLUTION INTRAVENOUS at 15:20

## 2017-01-01 RX ADMIN — BRENTUXIMAB VEDOTIN 123 MG: 50 INJECTION, POWDER, LYOPHILIZED, FOR SOLUTION INTRAVENOUS at 15:28

## 2017-01-01 RX ADMIN — BRENTUXIMAB VEDOTIN 123 MG: 50 INJECTION, POWDER, LYOPHILIZED, FOR SOLUTION INTRAVENOUS at 15:45

## 2017-01-01 RX ADMIN — BRENTUXIMAB VEDOTIN 123 MG: 50 INJECTION, POWDER, LYOPHILIZED, FOR SOLUTION INTRAVENOUS at 16:22

## 2017-01-01 RX ADMIN — SODIUM CHLORIDE 250 ML: 9 INJECTION, SOLUTION INTRAVENOUS at 15:20

## 2017-01-01 ASSESSMENT — PAIN SCALES - GENERAL
PAINLEVEL: MODERATE PAIN (4)
PAINLEVEL: NO PAIN (0)

## 2017-03-16 ENCOUNTER — APPOINTMENT (OUTPATIENT)
Dept: LAB | Facility: CLINIC | Age: 82
End: 2017-03-16
Attending: INTERNAL MEDICINE
Payer: COMMERCIAL

## 2017-03-16 ENCOUNTER — ONCOLOGY VISIT (OUTPATIENT)
Dept: ONCOLOGY | Facility: CLINIC | Age: 82
End: 2017-03-16
Attending: INTERNAL MEDICINE
Payer: COMMERCIAL

## 2017-03-16 VITALS
RESPIRATION RATE: 18 BRPM | DIASTOLIC BLOOD PRESSURE: 84 MMHG | WEIGHT: 155.42 LBS | TEMPERATURE: 98.4 F | HEART RATE: 79 BPM | BODY MASS INDEX: 25.86 KG/M2 | OXYGEN SATURATION: 96 % | SYSTOLIC BLOOD PRESSURE: 158 MMHG

## 2017-03-16 DIAGNOSIS — C84.A0 CUTANEOUS T-CELL LYMPHOMA, UNSPECIFIED BODY REGION (H): ICD-10-CM

## 2017-03-16 LAB
ALBUMIN SERPL-MCNC: 3 G/DL (ref 3.4–5)
ALP SERPL-CCNC: 79 U/L (ref 40–150)
ALT SERPL W P-5'-P-CCNC: 18 U/L (ref 0–70)
ANION GAP SERPL CALCULATED.3IONS-SCNC: 8 MMOL/L (ref 3–14)
AST SERPL W P-5'-P-CCNC: 9 U/L (ref 0–45)
BASOPHILS # BLD AUTO: 0 10E9/L (ref 0–0.2)
BASOPHILS NFR BLD AUTO: 0.2 %
BILIRUB SERPL-MCNC: 0.3 MG/DL (ref 0.2–1.3)
BUN SERPL-MCNC: 16 MG/DL (ref 7–30)
CALCIUM SERPL-MCNC: 8.6 MG/DL (ref 8.5–10.1)
CHLORIDE SERPL-SCNC: 108 MMOL/L (ref 94–109)
CO2 SERPL-SCNC: 28 MMOL/L (ref 20–32)
CREAT SERPL-MCNC: 0.87 MG/DL (ref 0.66–1.25)
DIFFERENTIAL METHOD BLD: ABNORMAL
EOSINOPHIL # BLD AUTO: 0 10E9/L (ref 0–0.7)
EOSINOPHIL NFR BLD AUTO: 0.4 %
ERYTHROCYTE [DISTWIDTH] IN BLOOD BY AUTOMATED COUNT: 13.6 % (ref 10–15)
GFR SERPL CREATININE-BSD FRML MDRD: 83 ML/MIN/1.7M2
GLUCOSE SERPL-MCNC: 141 MG/DL (ref 70–99)
HCT VFR BLD AUTO: 40.3 % (ref 40–53)
HGB BLD-MCNC: 13.5 G/DL (ref 13.3–17.7)
IMM GRANULOCYTES # BLD: 0.1 10E9/L (ref 0–0.4)
IMM GRANULOCYTES NFR BLD: 0.8 %
LDH SERPL L TO P-CCNC: 173 U/L (ref 85–227)
LYMPHOCYTES # BLD AUTO: 0.9 10E9/L (ref 0.8–5.3)
LYMPHOCYTES NFR BLD AUTO: 8 %
MCH RBC QN AUTO: 29.7 PG (ref 26.5–33)
MCHC RBC AUTO-ENTMCNC: 33.5 G/DL (ref 31.5–36.5)
MCV RBC AUTO: 89 FL (ref 78–100)
MONOCYTES # BLD AUTO: 0.8 10E9/L (ref 0–1.3)
MONOCYTES NFR BLD AUTO: 7.1 %
NEUTROPHILS # BLD AUTO: 8.9 10E9/L (ref 1.6–8.3)
NEUTROPHILS NFR BLD AUTO: 83.5 %
NRBC # BLD AUTO: 0 10*3/UL
NRBC BLD AUTO-RTO: 0 /100
PLATELET # BLD AUTO: 298 10E9/L (ref 150–450)
POTASSIUM SERPL-SCNC: 3.4 MMOL/L (ref 3.4–5.3)
PROT SERPL-MCNC: 6.8 G/DL (ref 6.8–8.8)
RBC # BLD AUTO: 4.55 10E12/L (ref 4.4–5.9)
SODIUM SERPL-SCNC: 144 MMOL/L (ref 133–144)
WBC # BLD AUTO: 10.6 10E9/L (ref 4–11)

## 2017-03-16 PROCEDURE — 83615 LACTATE (LD) (LDH) ENZYME: CPT | Performed by: HOSPITALIST

## 2017-03-16 PROCEDURE — 80053 COMPREHEN METABOLIC PANEL: CPT | Performed by: HOSPITALIST

## 2017-03-16 PROCEDURE — 99214 OFFICE O/P EST MOD 30 MIN: CPT | Mod: GC | Performed by: INTERNAL MEDICINE

## 2017-03-16 PROCEDURE — 85025 COMPLETE CBC W/AUTO DIFF WBC: CPT | Performed by: HOSPITALIST

## 2017-03-16 PROCEDURE — 99212 OFFICE O/P EST SF 10 MIN: CPT | Mod: ZF

## 2017-03-16 RX ORDER — IBUPROFEN 600 MG/1
600 TABLET, FILM COATED ORAL
COMMUNITY
Start: 2012-08-30

## 2017-03-16 NOTE — MR AVS SNAPSHOT
After Visit Summary   3/16/2017    Bobby Bethea    MRN: [id number]           Patient Information     Date Of Birth          7/2/1929        Visit Information        Provider Department      3/16/2017 3:00 PM Eddie Modi MD [facility]        Today's Diagnoses     Cutaneous T-cell lymphoma, unspecified body region (H)           Follow-ups after your visit        Your next 10 appointments already scheduled     Mar 22, 2017  2:20 PM CDT   (Arrive by 2:05 PM)   CT ABDOMEN PELVIS W CONTRAST with UCCT2   [facility] [place] [facility] ([facility])    [address] [address] [phone]           Please bring any scans or X-rays taken at other hospitals, if similar tests were done. Also bring a list of your medicines, including vitamins, minerals and over-the-counter drugs. It is safest to leave personal items at home.  Be sure to tell your doctor:   If you have any allergies.   If there s any chance you are pregnant.   If you are breastfeeding.   If you have any special needs.  You may have contrast for this exam. To prepare:   Do not eat or drink for 2 hours before your exam. If you need to take medicine, you may take it with small sips of water. (We may ask you to take liquid medicine as well.)   The day before your exam, drink extra fluids at least six 8-ounce glasses (unless your doctor tells you to restrict your fluids).  Patients over 70 or patients with diabetes or kidney problems:   If you haven t had a blood test (creatinine test) within the last 30 days, go to your clinic or Diagnostic Imaging Department for this test.  If you have diabetes:   If your kidney function is normal, continue taking your metformin (Avandamet, Glucophage, Glucovance, Metaglip) on the day of your exam.   If your kidney function is abnormal, wait 48 hours before restarting this medicine.  You will have oral contrast for this exam:   You  "will drink the contrast at home. Get this from your clinic or Diagnostic Imaging Department. Please follow the directions given.  Please wear loose clothing, such as a sweat suit or jogging clothes. Avoid snaps, zippers and other metal. We may ask you to undress and put on a hospital gown.  If you have any questions, please call the Imaging Department where you will have your exam.              Future tests that were ordered for you today     Open Future Orders        Priority Expected Expires Ordered    CT Abdomen pelvis w contrast* Routine 3/20/2017 3/16/2018 3/16/2017            Who to contact     If you have questions or need follow up information about today's clinic visit or your schedule please contact [facility] directly at [phone].  Normal or non-critical lab and imaging results will be communicated to you by HealthEdgehart, letter or phone within 4 business days after the clinic has received the results. If you do not hear from us within 7 days, please contact the clinic through HealthEdgehart or phone. If you have a critical or abnormal lab result, we will notify you by phone as soon as possible.  Submit refill requests through [employer] or call your pharmacy and they will forward the refill request to us. Please allow 3 business days for your refill to be completed.          Additional Information About Your Visit        Muecs Information     Muecs lets you send messages to your doctor, view your test results, renew your prescriptions, schedule appointments and more. To sign up, go to [email or web address] . Click on \"Log in\" on the left side of the screen, which will take you to the Welcome page. Then click on \"Sign up Now\" on the right side of the page.     You will be asked to enter the access code listed below, as well as some personal information. Please follow the directions to create your username and password.     Your access code is: [email or web address]  Expires: 6/14/2017  4:43 PM   "   Your access code will  in 90 days. If you need help or a new code, please call your [place] clinic or [phone].        Care EveryWhere ID     This is your Care EveryWhere ID. This could be used by other organizations to access your [place] medical records  [id number]        Your Vitals Were     Pulse Temperature Respirations Pulse Oximetry BMI (Body Mass Index)       79 98.4  F (36.9  C) (Oral) 18 96% 25.86 kg/m2        Blood Pressure from Last 3 Encounters:   17 158/84   16 155/73   16 (!) 182/101    Weight from Last 3 Encounters:   17 70.5 kg (155 lb 6.8 oz)   16 70 kg (154 lb 5.2 oz)   16 70.2 kg (154 lb 12.8 oz)              We Performed the Following     CBC with platelets differential     Comprehensive metabolic panel     Lactate Dehydrogenase        Primary Care Provider Office Phone # Fax #    [facility] [phone]       [address] [place] [address]        Thank you!     Thank you for choosing [facility]  for your care. Our goal is always to provide you with excellent care. Hearing back from our patients is one way we can continue to improve our services. Please take a few minutes to complete the written survey that you may receive in the mail after your visit with us. Thank you!             Your Updated Medication List - Protect others around you: Learn how to safely use, store and throw away your medicines at www.disposemymeds.org.          This list is accurate as of: 3/16/17  4:43 PM.  Always use your most recent med list.                   Brand Name Dispense Instructions for use    aspirin 81 MG tablet      Take 1 tablet by mouth daily.       DAILY MULTIVITAMIN PO      Take  by mouth daily.       fluocinonide 0.05 % cream    LIDEX    120 g    Apply topically 2 times daily Apply topically twice daily as needed for itchy rash on body.       hydrocortisone 2.5 % cream     454 g    Apply   topically 2 times daily. Mix with vanicream       ibuprofen 600 MG tablet    ADVIL/MOTRIN     Take 600 mg by mouth       influenza Vac Split High-Dose 0.5 ML injection    FLUZONE     Indications: PN:   FLORESITACHOLO BOYDROS   Mon Apr 6, 2015  2:55 PM Received from: External Pharmacy       losartan 25 MG tablet    COZAAR    30 tablet    Take 1 tablet by mouth daily.       magnesium 250 MG tablet      Take 1 tablet by mouth daily.       PROSCAR 5 MG tablet   Generic drug:  finasteride      Take 5 mg by mouth daily.       TYLENOL PO      Take 325 mg by mouth.       UNKNOWN TO PATIENT      Reported on 3/16/2017       Vitamin C 500 MG Caps      Take 250 mg by mouth daily.       vitamin D 1000 UNITS capsule      Take 1 capsule by mouth daily.

## 2017-03-16 NOTE — LETTER
3/16/2017      RE: Bobby Bethea  [address] [facility] [facility] [address]         DATE OF VISIT :3/16/2017     HISTORY OF PRESENT ILLNESS :  Mr. 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  He has had 3 treatments for the extra cutaneous CD30-positive disease, including -- 12/1998 (CHOP - good response), 2012 (relapse treated with CVP - mixed response), and then 8 cycles of brentuximab vedotin completed on 05/21/2013 . He had a good clinical response including in the nodes and skin with Bretuximab therapy .  He has had no evidence of recurrent dorothea disease since brentuximab. The CTCL is being monitored and treated in the Dermatology Clinic by Dr. Portillo .    INTERVAL HISTORY : Mr. Bethea has been doing relatively well. However, since last Thursday he has noted night sweats daily which makes him change shirt at least twice. He also has noted low grade temperature 99.8,usually less than 100. He has also noted the appearance of left groin adenopathy since then.     Denies chest pain, SOB, cough, phlegmn, nausea, vomiting, abdominal pain, diarrhoea, constipation, fatigue, loss of weight, loss of appetite, urinary problems, headache, dizziness, blurry vision, fevers, chills.    REVIEW OF SYSTEMS:  A 12-point review of systems is otherwise generally negative.      ALLERGIES:  Atenolol/bee venom.       MEDICATIONS:   1.  Hydrocortisone cream (not applying regularly).   2.  Losartan.   3.  Acetaminophen p.r.n.   4.  Magnesium.   5.  Vitamin D.   6.  Aspirin 81 mg.   7.  Multivitamins.   8.  Finasteride.   9.  Vitamin C.      PHYSICAL EXAMINATION:   VITAL SIGNS:  /84  Pulse 79  Temp 98.4  F (36.9  C) (Oral)  Resp 18  Wt 70.5 kg (155 lb 6.8 oz)  SpO2 96%  BMI 25.86 kg/m2  HEENT:  Atraumatic . Normocephalic . Anicteric.  Pupils equal and reactive.  EOMs intact.  Oropharynx with no masses.   Mucosae moist, no lesions.   NECK:   Supple . No lymphadenopathy . Thyroid normal.    CHEST:  Clear to auscultation. No wheezing or crackles.   AXILLAE:  No nodes.   HEART:  Regular rhythm.  No murmur or gallop.   ABDOMEN:  Soft. Non tender . Active bowels sound . The spleen is not palpable. Fullness in RLQ. ? mass. No direct or rebound tenderness. Has 3-4 cm left femoral/groin lymph node firm to hard in consistency. Non tender.  EXTREMITIES:  No significant lower extremity edema.  No epitrochlear nodes.   SKIN:  Scattered small scaly erythematous patches with dryness and scaling in left side of his back. Has left medial above knee region firm ~2 cm nodular lesion, with erythema in skin, non tender.   NEUROLOGY : Normal mental status . No focal neurologic deficit .      LABORATORY DATA :    Hemoglobin 13.5 g % with 10,600 WBCs, ANC: 8.9 and 298 ,000 platelets.    Electrolytes, calcium, creatinine (1.87) normal.   Liver enzymes, including serum LDH, normal. Albumin low 3.0  Random gucose 141 .      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  He did respond well to Brentuximab and completed treatment in May 2013. His cutaneous disease remains slightly active but is relatively mild.     New adenopathy, skin lesion, night sweats, low grade temperature are concerning for lymphoma. We will obtain CT abdomen and pelvis within a week. Based on results may proceed to ultrasound guided biopsy of lymph node. Biopsy of cutaneous lesion near the left knee region may also be considered. Further appointments to be made after results of CT imaging.    Patient seen and plan discussed with Dr Modi.     SADIA Brock, MS.  Hematology/Oncology Fellow  [facility]  Pager [phone]    Oncology Attending    Patient seen and examined with the Oncology Fellow, Dr. Hdz. Agree with her findings, assessment and plan.    Eddie Modi MD  Professor of  Medicine  Oncology  [facility]  Office: [phone]  Clinic Fax: [phone]      CC:    MD Grey Sanchez MD Bruce A. Peterson, MD

## 2017-03-16 NOTE — NURSING NOTE
"Bobby Bethea is a 87 year old male who presents for:  Chief Complaint   Patient presents with     Lab Only     venipuncture blood draw and vitals     Oncology Clinic Visit     been having night sweats x1 week, groin discomfort        Initial Vitals:  /84  Pulse 79  Temp 98.4  F (36.9  C) (Oral)  Resp 18  Wt 70.5 kg (155 lb 6.8 oz)  SpO2 96%  BMI 25.86 kg/m2 Estimated body mass index is 25.86 kg/(m^2) as calculated from the following:    Height as of 10/27/15: 1.651 m (5' 5\").    Weight as of this encounter: 70.5 kg (155 lb 6.8 oz).. Body surface area is 1.8 meters squared. BP completed using cuff size: not taken  Data Unavailable No LMP for male patient. Allergies and medications reviewed.     Medications: Medication refills not needed today.  Pharmacy name entered into [employer]: [id number] [place], [place] - [address]    Comments: pt denies pain    5 minutes for nursing intake (face to face time)   Shweta Arenas CMA        "

## 2017-03-16 NOTE — PROGRESS NOTES
DATE OF VISIT :3/16/2017     HISTORY OF PRESENT ILLNESS :  Mr. 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  He has had 3 treatments for the extra cutaneous CD30-positive disease, including -- 12/1998 (CHOP - good response), 2012 (relapse treated with CVP - mixed response), and then 8 cycles of brentuximab vedotin completed on 05/21/2013 . He had a good clinical response including in the nodes and skin with Bretuximab therapy .  He has had no evidence of recurrent dorothea disease since brentuximab. The CTCL is being monitored and treated in the Dermatology Clinic by Dr. Portillo .    INTERVAL HISTORY : Mr. Bethea has been doing relatively well. However, since last Thursday he has noted night sweats daily which makes him change shirt at least twice. He also has noted low grade temperature 99.8,usually less than 100. He has also noted the appearance of left groin adenopathy since then.     Denies chest pain, SOB, cough, phlegmn, nausea, vomiting, abdominal pain, diarrhoea, constipation, fatigue, loss of weight, loss of appetite, urinary problems, headache, dizziness, blurry vision, fevers, chills.    REVIEW OF SYSTEMS:  A 12-point review of systems is otherwise generally negative.      ALLERGIES:  Atenolol/bee venom.       MEDICATIONS:   1.  Hydrocortisone cream (not applying regularly).   2.  Losartan.   3.  Acetaminophen p.r.n.   4.  Magnesium.   5.  Vitamin D.   6.  Aspirin 81 mg.   7.  Multivitamins.   8.  Finasteride.   9.  Vitamin C.      PHYSICAL EXAMINATION:   VITAL SIGNS:  /84  Pulse 79  Temp 98.4  F (36.9  C) (Oral)  Resp 18  Wt 70.5 kg (155 lb 6.8 oz)  SpO2 96%  BMI 25.86 kg/m2  HEENT:  Atraumatic . Normocephalic . Anicteric.  Pupils equal and reactive.  EOMs intact.  Oropharynx with no masses.  Mucosae moist, no lesions.   NECK:   Supple . No lymphadenopathy . Thyroid normal.    CHEST:  Clear to  auscultation. No wheezing or crackles.   AXILLAE:  No nodes.   HEART:  Regular rhythm.  No murmur or gallop.   ABDOMEN:  Soft. Non tender . Active bowels sound . The spleen is not palpable. Fullness in RLQ. ? mass. No direct or rebound tenderness. Has 3-4 cm left femoral/groin lymph node firm to hard in consistency. Non tender.  EXTREMITIES:  No significant lower extremity edema.  No epitrochlear nodes.   SKIN:  Scattered small scaly erythematous patches with dryness and scaling in left side of his back. Has left medial above knee region firm ~2 cm nodular lesion, with erythema in skin, non tender.   NEUROLOGY : Normal mental status . No focal neurologic deficit .      LABORATORY DATA :    Hemoglobin 13.5 g % with 10,600 WBCs, ANC: 8.9 and 298 ,000 platelets.    Electrolytes, calcium, creatinine (1.87) normal.   Liver enzymes, including serum LDH, normal. Albumin low 3.0  Random gucose 141 .      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  He did respond well to Brentuximab and completed treatment in May 2013. His cutaneous disease remains slightly active but is relatively mild.     New adenopathy, skin lesion, night sweats, low grade temperature are concerning for lymphoma. We will obtain CT abdomen and pelvis within a week. Based on results may proceed to ultrasound guided biopsy of lymph node. Biopsy of cutaneous lesion near the left knee region may also be considered. Further appointments to be made after results of CT imaging.    Patient seen and plan discussed with Dr Modi.     SADIA Brock, MS.  Hematology/Oncology Fellow  [facility]  Pager [phone]    Oncology Attending    Patient seen and examined with the Oncology Fellow, Dr. Hdz. Agree with her findings, assessment and plan.    Eddie Modi MD  Professor of Medicine  Oncology  [facility]  Office: [phone]  Clinic Fax:  [phone]      CC:    MD Grey Sanchez MD

## 2017-03-27 ENCOUNTER — CARE COORDINATION (OUTPATIENT)
Dept: ONCOLOGY | Facility: CLINIC | Age: 82
End: 2017-03-27

## 2017-03-29 DIAGNOSIS — R50.9 FEVER OF UNDETERMINED ORIGIN: ICD-10-CM

## 2017-03-29 DIAGNOSIS — C85.99 EXTRANODAL LYMPHOMA (H): Primary | ICD-10-CM

## 2017-03-30 ENCOUNTER — ONCOLOGY VISIT (OUTPATIENT)
Dept: ONCOLOGY | Facility: CLINIC | Age: 82
End: 2017-03-30
Attending: INTERNAL MEDICINE
Payer: COMMERCIAL

## 2017-03-30 VITALS
RESPIRATION RATE: 16 BRPM | OXYGEN SATURATION: 98 % | BODY MASS INDEX: 25.79 KG/M2 | TEMPERATURE: 98.9 F | HEART RATE: 65 BPM | DIASTOLIC BLOOD PRESSURE: 65 MMHG | SYSTOLIC BLOOD PRESSURE: 155 MMHG | WEIGHT: 154.98 LBS

## 2017-03-30 DIAGNOSIS — R50.9 FEVER OF UNDETERMINED ORIGIN: ICD-10-CM

## 2017-03-30 DIAGNOSIS — C85.99 EXTRANODAL LYMPHOMA (H): ICD-10-CM

## 2017-03-30 DIAGNOSIS — C85.99 EXTRANODAL LYMPHOMA (H): Primary | ICD-10-CM

## 2017-03-30 LAB
CRP SERPL-MCNC: 126 MG/L (ref 0–8)
ERYTHROCYTE [SEDIMENTATION RATE] IN BLOOD BY WESTERGREN METHOD: 43 MM/H (ref 0–20)
TSH SERPL DL<=0.005 MIU/L-ACNC: 0.76 MU/L (ref 0.4–4)
URATE SERPL-MCNC: 4 MG/DL (ref 3.5–7.2)

## 2017-03-30 PROCEDURE — 86140 C-REACTIVE PROTEIN: CPT | Performed by: INTERNAL MEDICINE

## 2017-03-30 PROCEDURE — 00000155 ZZHCL STATISTIC H-CELL BLOCK W/STAIN: Performed by: INTERNAL MEDICINE

## 2017-03-30 PROCEDURE — 85652 RBC SED RATE AUTOMATED: CPT | Performed by: INTERNAL MEDICINE

## 2017-03-30 PROCEDURE — 99214 OFFICE O/P EST MOD 30 MIN: CPT | Mod: ZP | Performed by: INTERNAL MEDICINE

## 2017-03-30 PROCEDURE — 88184 FLOWCYTOMETRY/ TC 1 MARKER: CPT | Performed by: INTERNAL MEDICINE

## 2017-03-30 PROCEDURE — 84550 ASSAY OF BLOOD/URIC ACID: CPT | Performed by: INTERNAL MEDICINE

## 2017-03-30 PROCEDURE — 36415 COLL VENOUS BLD VENIPUNCTURE: CPT

## 2017-03-30 PROCEDURE — 88172 CYTP DX EVAL FNA 1ST EA SITE: CPT | Performed by: INTERNAL MEDICINE

## 2017-03-30 PROCEDURE — 87799 DETECT AGENT NOS DNA QUANT: CPT | Performed by: INTERNAL MEDICINE

## 2017-03-30 PROCEDURE — 82232 ASSAY OF BETA-2 PROTEIN: CPT | Performed by: INTERNAL MEDICINE

## 2017-03-30 PROCEDURE — 88177 CYTP FNA EVAL EA ADDL: CPT | Performed by: INTERNAL MEDICINE

## 2017-03-30 PROCEDURE — 88341 IMHCHEM/IMCYTCHM EA ADD ANTB: CPT | Performed by: INTERNAL MEDICINE

## 2017-03-30 PROCEDURE — 40001005 ZZHCL STATISTIC FLOW >15 ABY TC 88189: Performed by: INTERNAL MEDICINE

## 2017-03-30 PROCEDURE — 88185 FLOWCYTOMETRY/TC ADD-ON: CPT | Performed by: INTERNAL MEDICINE

## 2017-03-30 PROCEDURE — 99212 OFFICE O/P EST SF 10 MIN: CPT | Mod: 25

## 2017-03-30 PROCEDURE — 88173 CYTOPATH EVAL FNA REPORT: CPT | Performed by: INTERNAL MEDICINE

## 2017-03-30 PROCEDURE — 88342 IMHCHEM/IMCYTCHM 1ST ANTB: CPT | Performed by: INTERNAL MEDICINE

## 2017-03-30 PROCEDURE — 84443 ASSAY THYROID STIM HORMONE: CPT | Performed by: INTERNAL MEDICINE

## 2017-03-30 PROCEDURE — 88305 TISSUE EXAM BY PATHOLOGIST: CPT | Performed by: INTERNAL MEDICINE

## 2017-03-30 PROCEDURE — 87040 BLOOD CULTURE FOR BACTERIA: CPT | Performed by: INTERNAL MEDICINE

## 2017-03-30 ASSESSMENT — PAIN SCALES - GENERAL: PAINLEVEL: MILD PAIN (3)

## 2017-03-30 NOTE — MR AVS SNAPSHOT
After Visit Summary   3/30/2017    Bobby Bethea    MRN: [id number]           Patient Information     Date Of Birth          7/2/1929        Visit Information        Provider Department      3/30/2017 12:00 PM Eddie Modi MD [facility]        Today's Diagnoses     Extranodal lymphoma (H)    -  1      Care Instructions    Needle biopsy today - we will contact you with results and arrange follow-up at that time.        Follow-ups after your visit        Follow-up notes from your care team     Return will arrange.      Your next 10 appointments already scheduled     Apr 27, 2017  2:00 PM CDT   [facility] Draw with  [facility] DRAW   [facility])    [address] [facility]  [address] [facility] [address] [phone]              Future tests that were ordered for you today     Open Future Orders        Priority Expected Expires Ordered    Comprehensive metabolic panel Routine 4/27/2017 4/6/2018 4/6/2017    Lactate Dehydrogenase Routine 4/27/2017 4/6/2018 4/6/2017    Uric acid Routine 4/27/2017 4/6/2018 4/6/2017    *CBC with platelets differential Routine 4/27/2017 4/6/2018 4/6/2017            Who to contact     If you have questions or need follow up information about today's clinic visit or your schedule please contact [facility] directly at [phone].  Normal or non-critical lab and imaging results will be communicated to you by MyChart, letter or phone within 4 business days after the clinic has received the results. If you do not hear from us within 7 days, please contact the clinic through MyChart or phone. If you have a critical or abnormal lab result, we will notify you by phone as soon as possible.  Submit refill requests through [employer] or call your pharmacy and they will forward the refill request to us. Please allow 3 business days for your refill to be completed.          Additional  "Information About Your Visit        MyChart Information     [employer] lets you send messages to your doctor, view your test results, renew your prescriptions, schedule appointments and more. To sign up, go to [email or web address] . Click on \"Log in\" on the left side of the screen, which will take you to the Welcome page. Then click on \"Sign up Now\" on the right side of the page.     You will be asked to enter the access code listed below, as well as some personal information. Please follow the directions to create your username and password.     Your access code is: [email or web address]  Expires: 2017  4:43 PM     Your access code will  in 90 days. If you need help or a new code, please call your [place] clinic or [phone].        Care EveryWhere ID     This is your Care EveryWhere ID. This could be used by other organizations to access your [place] medical records  [id number]        Your Vitals Were     Pulse Temperature Respirations Pulse Oximetry BMI (Body Mass Index)       65 98.9  F (37.2  C) (Oral) 16 98% 25.79 kg/m2        Blood Pressure from Last 3 Encounters:   17 155/65   17 158/84   16 155/73    Weight from Last 3 Encounters:   17 70.3 kg (154 lb 15.7 oz)   17 70.5 kg (155 lb 6.8 oz)   16 70 kg (154 lb 5.2 oz)              We Performed the Following     Fine needle aspiration     Leukemia Lymphoma Evaluation (Flow Cytometry)        Primary Care Provider Office Phone # Fax #    [facility] [phone]       [address] [facility]        Thank you!     Thank you for choosing [facility]  for your care. Our goal is always to provide you with excellent care. Hearing back from our patients is one way we can continue to improve our services. Please take a few minutes to complete the written survey that you may receive in the mail after your visit with us. Thank you!             Your Updated Medication " List - Protect others around you: Learn how to safely use, store and throw away your medicines at www.disposemymeds.org.          This list is accurate as of: 3/30/17 11:59 PM.  Always use your most recent med list.                   Brand Name Dispense Instructions for use    aspirin 81 MG tablet      Take 1 tablet by mouth daily.       DAILY MULTIVITAMIN PO      Take  by mouth daily.       fluocinonide 0.05 % cream    LIDEX    120 g    Apply topically 2 times daily Apply topically twice daily as needed for itchy rash on body.       hydrocortisone 2.5 % cream     454 g    Apply  topically 2 times daily. Mix with vanicream       ibuprofen 600 MG tablet    ADVIL/MOTRIN     Take 600 mg by mouth       influenza Vac Split High-Dose 0.5 ML injection    FLUZONE     Indications: PN:   ONEL FONSECA   Mon Apr 6, 2015  2:55 PM Received from: External Pharmacy       losartan 25 MG tablet    COZAAR    30 tablet    Take 1 tablet by mouth daily.       magnesium 250 MG tablet      Take 1 tablet by mouth daily.       PROSCAR 5 MG tablet   Generic drug:  finasteride      Take 5 mg by mouth daily.       TYLENOL PO      Take 325 mg by mouth.       UNKNOWN TO PATIENT      Reported on 3/16/2017       Vitamin C 500 MG Caps      Take 250 mg by mouth daily.       vitamin D 1000 UNITS capsule      Take 1 capsule by mouth daily.

## 2017-03-30 NOTE — NURSING NOTE
"Bobby Bethea is a 87 year old male who presents for:  Chief Complaint   Patient presents with     Blood Draw     Oncology Clinic Visit     Return fro Lymphoma         Initial Vitals:  /65  Pulse 65  Temp 98.9  F (37.2  C) (Oral)  Resp 16  Wt 70.3 kg (154 lb 15.7 oz)  SpO2 98%  BMI 25.79 kg/m2 Estimated body mass index is 25.79 kg/(m^2) as calculated from the following:    Height as of 10/27/15: 1.651 m (5' 5\").    Weight as of this encounter: 70.3 kg (154 lb 15.7 oz).. Body surface area is 1.8 meters squared. BP completed using cuff size: NA (Not Taken)  Mild Pain (3) No LMP for male patient. Allergies and medications reviewed.     Medications: Medication refills not needed today.  Pharmacy name entered into [employer]: [id number] [address]    Comments: Pt states he has been running a Fever, Night sweats , Per patient .    6 minutes for nursing intake (face to face time)   Tahira Hewitt MA        "

## 2017-03-30 NOTE — PROGRESS NOTES
"  DATE OF VISIT :3/30/2017     HISTORY OF PRESENT ILLNESS :  Mr. 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  He has had 3 treatments for the extra cutaneous CD30-positive disease, including -- 12/1998 (CHOP - good response), 2012 (relapse treated with CVP - mixed response), and then 8 cycles of brentuximab vedotin completed on 05/21/2013 . He had a good clinical response including in the nodes and skin with Bretuximab therapy .  He has had no evidence of recurrent dorothea disease since brentuximab. The CTCL is being monitored and treated in the Dermatology Clinic by Dr. Portillo .    INTERVAL HISTORY : Mr. Bethea has been having fevers, chills and sweats. He has been taking tylenol which brings his temperature down.He is aware of results of CT imaging, which were discussed with him in advance of this clinic visit by Dr. Modi.    No abdominopelvic pain or LE edema. No rashes. Denies productive cough, dysuria, diarrhea, new pains. Is \"down\" as he has been in the past..    REVIEW OF SYSTEMS:  A 12-point review of systems is otherwise generally negative.      ALLERGIES:  Atenolol/bee venom.       MEDICATIONS:   1.  Hydrocortisone cream (not applying regularly).   2.  Losartan.   3.  Acetaminophen p.r.n.   4.  Magnesium.   5.  Vitamin D.   6.  Aspirin 81 mg.   7.  Multivitamins.   8.  Finasteride.   9.  Vitamin C.      PHYSICAL EXAMINATION:   VITAL SIGNS:  /65  Pulse 65  Temp 98.9  F (37.2  C) (Oral)  Resp 16  Wt 70.3 kg (154 lb 15.7 oz)  SpO2 98%  BMI 25.79 kg/m2  HEENT:  Anicteric.  No conjunctival injection. Oral mucosae moist, no lesions.   NECK:   No nodes   CHEST:  Clear.  AXILLAE:  No nodes.   HEART:  Regular rhythm.  No murmur.   ABDOMEN:  Non tender . Active bowels sound . The spleen is not palpable. . No direct or rebound tenderness. 4 cm left femoral lymph node with a couple of smaller " inguinal nodes.  EXTREMITIES:  No LE edema.   SKIN:  Unchanged   NEUROLOGY : Admits to chronically lacking focus, ambition and being worried about many things.      LABORATORY DATA :    CRP: 126  TSH: 0.76  Uric acid: 4.0  ESR 43  Blood culture 3/30/17: negative to date      CT imaging:   Abdomen and pelvis  IMPRESSION: In a patient with history of anaplastic large cell  lymphoma with a new lymph node in left femoral/groin region:     1. New/significantly enlarged left iliac chain lymph nodes suggestive of recurrence of disease.  2. Mildly increased size of pancreatic body cystic lesion which measured 8 mm on 11/19/2015 and measures 10 mm in the current exam. This likely represents a simple cyst or a sidebranch Intraductal papillary mucinous neoplasm.  3. Other incidental findings including enlarged prostate, colonic diverticulosis, liver hemangioma and degenerative changes in the lumbar spine.  4. Small 1 cm soft tissue attenuation lesion in the left inguinal canal is stable since the prior exam and of unknown clinical significance.  5. Multiple basilar pulmonary nodules are new compared to the prior exam. Chest not fully evaluated on this exam.    ASSESSMENT AND PLAN:  H/O anaplastic large cell lymphoma ( ALK-negative , CD30-positive) - likely recurrence based on symptoms,  new CT finding, confirming clinical exam.  The CT scan provides enough information to target a left inguinofemoral node by FNA. If an abnormal population of CD30+ cells is present, that will likely be sufficient to diagnose recurrence in the context of his other findings. In that instance, we would pursue further imaging and likely offer treatment with brentuximab, a prior therapy that controlled his lymphoma for nearly 4 years.    New adenopathy, skin lesion, night sweats, low grade temperature and imaging concerning for lymphoma recurrence. The cytology team is coming to obtain tissue for microscopy and flow. Will contact patient when results  available.    We discussed his mindset and again offered counseling, which he prefers not to pursue.    Patient seen and plan discussed with Dr Modi.     SADIA Brock, MS.  Hematology/Oncology Fellow  [facility]  Pager [phone]    Oncology Attending    Patient seen and examined with the Oncology Fellow, Dr. Hdz. Agree with her findings, assessment and plan. We spent approximately 30 minutes with Mr. Bethea, the majority of time in counseling and coordination of ongoing care.    Eddie Modi MD  Professor of Medicine  Oncology  [facility]  Office: [phone]  Clinic Fax: [phone]      CC:    MD Grey Sanchez MD

## 2017-03-30 NOTE — LETTER
"3/30/2017      RE: Bobby Bethea  [address] [facility] [id number]         DATE OF VISIT :3/30/2017     HISTORY OF PRESENT ILLNESS :  Mr. 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  He has had 3 treatments for the extra cutaneous CD30-positive disease, including -- 12/1998 (CHOP - good response), 2012 (relapse treated with CVP - mixed response), and then 8 cycles of brentuximab vedotin completed on 05/21/2013 . He had a good clinical response including in the nodes and skin with Bretuximab therapy .  He has had no evidence of recurrent dorothea disease since brentuximab. The CTCL is being monitored and treated in the Dermatology Clinic by Dr. Portillo .    INTERVAL HISTORY : Mr. Bethea has been having fevers, chills and sweats. He has been taking tylenol which brings his temperature down.He is aware of results of CT imaging, which were discussed with him in advance of this clinic visit by Dr. Modi.    No abdominopelvic pain or LE edema. No rashes. Denies productive cough, dysuria, diarrhea, new pains. Is \"down\" as he has been in the past..    REVIEW OF SYSTEMS:  A 12-point review of systems is otherwise generally negative.      ALLERGIES:  Atenolol/bee venom.       MEDICATIONS:   1.  Hydrocortisone cream (not applying regularly).   2.  Losartan.   3.  Acetaminophen p.r.n.   4.  Magnesium.   5.  Vitamin D.   6.  Aspirin 81 mg.   7.  Multivitamins.   8.  Finasteride.   9.  Vitamin C.      PHYSICAL EXAMINATION:   VITAL SIGNS:  /65  Pulse 65  Temp 98.9  F (37.2  C) (Oral)  Resp 16  Wt 70.3 kg (154 lb 15.7 oz)  SpO2 98%  BMI 25.79 kg/m2  HEENT:  Anicteric.  No conjunctival injection. Oral mucosae moist, no lesions.   NECK:   No nodes   CHEST:  Clear.  AXILLAE:  No nodes.   HEART:  Regular rhythm.  No murmur.   ABDOMEN:  Non tender . Active bowels sound . The spleen is not " palpable. . No direct or rebound tenderness. 4 cm left femoral lymph node with a couple of smaller inguinal nodes.  EXTREMITIES:  No LE edema.   SKIN:  Unchanged   NEUROLOGY : Admits to chronically lacking focus, ambition and being worried about many things.      LABORATORY DATA :    CRP: 126  TSH: 0.76  Uric acid: 4.0  ESR 43  Blood culture 3/30/17: negative to date      CT imaging:   Abdomen and pelvis  IMPRESSION: In a patient with history of anaplastic large cell  lymphoma with a new lymph node in left femoral/groin region:     1. New/significantly enlarged left iliac chain lymph nodes suggestive of recurrence of disease.  2. Mildly increased size of pancreatic body cystic lesion which measured 8 mm on 11/19/2015 and measures 10 mm in the current exam. This likely represents a simple cyst or a sidebranch Intraductal papillary mucinous neoplasm.  3. Other incidental findings including enlarged prostate, colonic diverticulosis, liver hemangioma and degenerative changes in the lumbar spine.  4. Small 1 cm soft tissue attenuation lesion in the left inguinal canal is stable since the prior exam and of unknown clinical significance.  5. Multiple basilar pulmonary nodules are new compared to the prior exam. Chest not fully evaluated on this exam.    ASSESSMENT AND PLAN:  H/O anaplastic large cell lymphoma ( ALK-negative , CD30-positive) - likely recurrence based on symptoms,  new CT finding, confirming clinical exam.  The CT scan provides enough information to target a left inguinofemoral node by FNA. If an abnormal population of CD30+ cells is present, that will likely be sufficient to diagnose recurrence in the context of his other findings. In that instance, we would pursue further imaging and likely offer treatment with brentuximab, a prior therapy that controlled his lymphoma for nearly 4 years.    New adenopathy, skin lesion, night sweats, low grade temperature and imaging concerning for lymphoma recurrence. The  cytology team is coming to obtain tissue for microscopy and flow. Will contact patient when results available.    We discussed his mindset and again offered counseling, which he prefers not to pursue.    Patient seen and plan discussed with Dr Modi.     SADIA Brock, MS.  Hematology/Oncology Fellow  [facility]  Pager [phone]    Oncology Attending    Patient seen and examined with the Oncology Fellow, Dr. Hdz. Agree with her findings, assessment and plan. We spent approximately 30 minutes with Mr. Bethea, the majority of time in counseling and coordination of ongoing care.    Eddie Modi MD  Professor of Medicine  Oncology  [facility]  Office: [phone]  Clinic Fax: [phone]      CC:    MD Grey Sanchez MD Bruce A. Peterson, MD

## 2017-03-31 LAB — B2 MICROGLOB SERPL-MCNC: 2.9 MG/L

## 2017-04-03 LAB
EBV DNA # SPEC NAA+PROBE: ABNORMAL {COPIES}/ML
EBV DNA SPEC NAA+PROBE-LOG#: ABNORMAL {LOG_COPIES}/ML

## 2017-04-04 LAB
COPATH REPORT: NORMAL
COPATH REPORT: NORMAL

## 2017-04-05 LAB
BACTERIA SPEC CULT: NO GROWTH
MICRO REPORT STATUS: NORMAL
SPECIMEN SOURCE: NORMAL

## 2017-04-06 DIAGNOSIS — R50.9 FEVER OF UNDETERMINED ORIGIN: ICD-10-CM

## 2017-04-06 DIAGNOSIS — C85.99 EXTRANODAL LYMPHOMA (H): Primary | ICD-10-CM

## 2017-04-27 ENCOUNTER — APPOINTMENT (OUTPATIENT)
Dept: LAB | Facility: CLINIC | Age: 82
End: 2017-04-27
Attending: INTERNAL MEDICINE
Payer: COMMERCIAL

## 2017-04-27 ENCOUNTER — ONCOLOGY VISIT (OUTPATIENT)
Dept: ONCOLOGY | Facility: CLINIC | Age: 82
End: 2017-04-27
Attending: INTERNAL MEDICINE
Payer: COMMERCIAL

## 2017-04-27 VITALS
RESPIRATION RATE: 16 BRPM | BODY MASS INDEX: 25.12 KG/M2 | DIASTOLIC BLOOD PRESSURE: 64 MMHG | WEIGHT: 150.79 LBS | OXYGEN SATURATION: 97 % | TEMPERATURE: 99 F | SYSTOLIC BLOOD PRESSURE: 122 MMHG | HEIGHT: 65 IN | HEART RATE: 60 BPM

## 2017-04-27 DIAGNOSIS — C85.99 EXTRANODAL LYMPHOMA (H): ICD-10-CM

## 2017-04-27 DIAGNOSIS — R50.9 FEVER OF UNDETERMINED ORIGIN: ICD-10-CM

## 2017-04-27 LAB
ALBUMIN SERPL-MCNC: 2.8 G/DL (ref 3.4–5)
ALP SERPL-CCNC: 97 U/L (ref 40–150)
ALT SERPL W P-5'-P-CCNC: 16 U/L (ref 0–70)
ANION GAP SERPL CALCULATED.3IONS-SCNC: 6 MMOL/L (ref 3–14)
AST SERPL W P-5'-P-CCNC: 11 U/L (ref 0–45)
BASOPHILS # BLD AUTO: 0 10E9/L (ref 0–0.2)
BASOPHILS NFR BLD AUTO: 0.1 %
BILIRUB SERPL-MCNC: 1.3 MG/DL (ref 0.2–1.3)
BUN SERPL-MCNC: 13 MG/DL (ref 7–30)
CALCIUM SERPL-MCNC: 8.2 MG/DL (ref 8.5–10.1)
CHLORIDE SERPL-SCNC: 107 MMOL/L (ref 94–109)
CO2 SERPL-SCNC: 25 MMOL/L (ref 20–32)
CREAT SERPL-MCNC: 0.87 MG/DL (ref 0.66–1.25)
DIFFERENTIAL METHOD BLD: ABNORMAL
EOSINOPHIL # BLD AUTO: 0.1 10E9/L (ref 0–0.7)
EOSINOPHIL NFR BLD AUTO: 0.6 %
ERYTHROCYTE [DISTWIDTH] IN BLOOD BY AUTOMATED COUNT: 15.1 % (ref 10–15)
GFR SERPL CREATININE-BSD FRML MDRD: 83 ML/MIN/1.7M2
GLUCOSE SERPL-MCNC: 155 MG/DL (ref 70–99)
HCT VFR BLD AUTO: 40.8 % (ref 40–53)
HGB BLD-MCNC: 12.9 G/DL (ref 13.3–17.7)
IMM GRANULOCYTES # BLD: 0.1 10E9/L (ref 0–0.4)
IMM GRANULOCYTES NFR BLD: 0.6 %
LDH SERPL L TO P-CCNC: 222 U/L (ref 85–227)
LYMPHOCYTES # BLD AUTO: 0.8 10E9/L (ref 0.8–5.3)
LYMPHOCYTES NFR BLD AUTO: 3.6 %
MCH RBC QN AUTO: 28.7 PG (ref 26.5–33)
MCHC RBC AUTO-ENTMCNC: 31.6 G/DL (ref 31.5–36.5)
MCV RBC AUTO: 91 FL (ref 78–100)
MONOCYTES # BLD AUTO: 1 10E9/L (ref 0–1.3)
MONOCYTES NFR BLD AUTO: 4.7 %
NEUTROPHILS # BLD AUTO: 18.9 10E9/L (ref 1.6–8.3)
NEUTROPHILS NFR BLD AUTO: 90.4 %
NRBC # BLD AUTO: 0 10*3/UL
NRBC BLD AUTO-RTO: 0 /100
PLATELET # BLD AUTO: 289 10E9/L (ref 150–450)
POTASSIUM SERPL-SCNC: 3.6 MMOL/L (ref 3.4–5.3)
PROT SERPL-MCNC: 6.6 G/DL (ref 6.8–8.8)
RBC # BLD AUTO: 4.5 10E12/L (ref 4.4–5.9)
SODIUM SERPL-SCNC: 138 MMOL/L (ref 133–144)
URATE SERPL-MCNC: 3.9 MG/DL (ref 3.5–7.2)
WBC # BLD AUTO: 20.9 10E9/L (ref 4–11)

## 2017-04-27 PROCEDURE — 36415 COLL VENOUS BLD VENIPUNCTURE: CPT

## 2017-04-27 PROCEDURE — 83615 LACTATE (LD) (LDH) ENZYME: CPT | Performed by: INTERNAL MEDICINE

## 2017-04-27 PROCEDURE — 85025 COMPLETE CBC W/AUTO DIFF WBC: CPT | Performed by: INTERNAL MEDICINE

## 2017-04-27 PROCEDURE — 99212 OFFICE O/P EST SF 10 MIN: CPT | Mod: ZF

## 2017-04-27 PROCEDURE — 84550 ASSAY OF BLOOD/URIC ACID: CPT | Performed by: INTERNAL MEDICINE

## 2017-04-27 PROCEDURE — 99215 OFFICE O/P EST HI 40 MIN: CPT | Mod: ZP | Performed by: INTERNAL MEDICINE

## 2017-04-27 PROCEDURE — 80053 COMPREHEN METABOLIC PANEL: CPT | Performed by: INTERNAL MEDICINE

## 2017-04-27 ASSESSMENT — PAIN SCALES - GENERAL: PAINLEVEL: NO PAIN (0)

## 2017-04-27 NOTE — MR AVS SNAPSHOT
After Visit Summary   4/27/2017    Bobby Bethea    MRN: [id number]           Patient Information     Date Of Birth          7/2/1929        Visit Information        Provider Department      4/27/2017 2:30 PM Eddie Modi MD [facility]        Today's Diagnoses     Extranodal lymphoma (H)        Fever of undetermined origin          Care Instructions    We will call regarding your decision to start therapy with brentuximab.        Follow-ups after your visit        Follow-up notes from your care team     Return will arrange.      Your next 10 appointments already scheduled     May 05, 2017 10:00 AM CDT   [facility] Lab Draw with [facility] DRAW   [facility] Lab Draw ([facility])    [address] [place] [id number] [phone]            May 05, 2017 10:30 AM CDT   Infusion 60 with UC ONCOLOGY INFUSION, UC 30 ATC   [facility] Cancer [facility] ([facility])    [address] [place] [id number] [phone]              Who to contact     If you have questions or need follow up information about today's clinic visit or your schedule please contact [facility] directly at [phone].  Normal or non-critical lab and imaging results will be communicated to you by MyChart, letter or phone within 4 business days after the clinic has received the results. If you do not hear from us within 7 days, please contact the clinic through MyChart or phone. If you have a critical or abnormal lab result, we will notify you by phone as soon as possible.  Submit refill requests through [employer] or call your pharmacy and they will forward the refill request to us. Please allow 3 business days for your refill to be completed.          Additional Information About Your Visit        [employer] Information     [employer] lets you send messages to your doctor, view your test  "results, renew your prescriptions, schedule appointments and more. To sign up, go to [email or web address] . Click on \"Log in\" on the left side of the screen, which will take you to the Welcome page. Then click on \"Sign up Now\" on the right side of the page.     You will be asked to enter the access code listed below, as well as some personal information. Please follow the directions to create your username and password.     Your access code is: [email or web address]  Expires: 2017  4:43 PM     Your access code will  in 90 days. If you need help or a new code, please call your [place] clinic or [phone].        Care EveryWhere ID     This is your Care EveryWhere ID. This could be used by other organizations to access your [place] medical records  [id number]        Your Vitals Were     Pulse Temperature Respirations Height Pulse Oximetry BMI (Body Mass Index)    60 99  F (37.2  C) (Oral) 16 1.651 m (5' 5\") 97% 25.09 kg/m2       Blood Pressure from Last 3 Encounters:   17 122/64   17 155/65   17 158/84    Weight from Last 3 Encounters:   17 68.4 kg (150 lb 12.7 oz)   17 70.3 kg (154 lb 15.7 oz)   17 70.5 kg (155 lb 6.8 oz)              We Performed the Following     *CBC with platelets differential     Comprehensive metabolic panel     Lactate Dehydrogenase     Uric acid          Today's Medication Changes          These changes are accurate as of: 17 11:59 PM.  If you have any questions, ask your nurse or doctor.               Stop taking these medicines if you haven't already. Please contact your care team if you have questions.     influenza Vac Split High-Dose 0.5 ML injection   Commonly known as:  FLUZONE   Stopped by:  Eddie Modi MD           UNKNOWN TO PATIENT   Stopped by:  Eddie Modi MD                    Primary Care Provider Office Phone # Fax #    [facility] [phone]       [address] [facility] " [place]        Thank you!     Thank you for choosing [facility]  for your care. Our goal is always to provide you with excellent care. Hearing back from our patients is one way we can continue to improve our services. Please take a few minutes to complete the written survey that you may receive in the mail after your visit with us. Thank you!             Your Updated Medication List - Protect others around you: Learn how to safely use, store and throw away your medicines at www.disposemymeds.org.          This list is accurate as of: 4/27/17 11:59 PM.  Always use your most recent med list.                   Brand Name Dispense Instructions for use    aspirin 81 MG tablet      Take 1 tablet by mouth daily.       DAILY MULTIVITAMIN PO      Take  by mouth daily.       fluocinonide 0.05 % cream    LIDEX    120 g    Apply topically 2 times daily Apply topically twice daily as needed for itchy rash on body.       hydrocortisone 2.5 % cream     454 g    Apply  topically 2 times daily. Mix with vanicream       ibuprofen 600 MG tablet    ADVIL/MOTRIN     Take 600 mg by mouth       losartan 25 MG tablet    COZAAR    30 tablet    Take 1 tablet by mouth daily.       magnesium 250 MG tablet      Take 1 tablet by mouth daily.       PROSCAR 5 MG tablet   Generic drug:  finasteride      Take 5 mg by mouth daily.       TYLENOL PO      Take 325 mg by mouth daily as needed       Vitamin C 500 MG Caps      Take 250 mg by mouth daily.       vitamin D 1000 UNITS capsule      Take 1 capsule by mouth daily.

## 2017-04-27 NOTE — NURSING NOTE
"Oncology Rooming Note    April 27, 2017 2:24 PM   Bobby Bethea is a 52 year old male who presents for: Labs Only and Oncology Clinic Visit    Initial Vitals: /67 (BP Location: Right arm, Patient Position: Chair, Cuff Size: Adult Regular)  Pulse 60  Temp 99  F (37.2  C) (Oral)  Resp 16  Ht 1.651 m (5' 5\")  Wt 68.4 kg (150 lb 12.7 oz)  SpO2 97%  BMI 25.09 kg/m2 Estimated body mass index is 25.09 kg/(m^2) as calculated from the following:    Height as of this encounter: 1.651 m (5' 5\").    Weight as of this encounter: 68.4 kg (150 lb 12.7 oz). Body surface area is 1.77 meters squared.  No Pain (0) Comment: Data Unavailable   No LMP for male patient.  Allergies reviewed: Yes  Medications reviewed: Yes    Medications: Medication refills not needed today.  Pharmacy name entered into [facility]: [id number] [place], [place] - [address]    Clinical concerns: None Dr Modi was NOT notified.    7 minutes for nursing intake (face to face time)     Rosario Hall LPN                "

## 2017-04-27 NOTE — PROGRESS NOTES
DATE OF VISIT :4/27/17  LAST VISIT:3/30/2017     HISTORY OF PRESENT ILLNESS :  Mr. 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  He has had 3 treatments for the extra cutaneous CD30-positive disease, including -- 12/1998 (CHOP - good response), 2012 (relapse treated with CVP - mixed response), and then 8 cycles of brentuximab vedotin completed on 05/21/2013 . He had a good clinical response including in the nodes and skin with Bretuximab therapy .  Until recently, he has had no evidence of recurrent dorothea disease since the brentuximab. However, in early 03/2017 he began to have fevers and sweats, and a 3-4 cm node in the left inguinal region and a cutaneous nodule above his left knee. An abdominal CT showed new enlarged lymph nodes in the left iliac and inguinofemoral regions. He was seen again on 03/30/2017 to review the results and obtain a FNA of a left groin node, which was positive for CD30+ lymphoma.    INTERVAL HISTORY : After the visit and biopsy on 03/30, Mr. Bethea was informed by telephone on 2 occasions of the biopsy and CT findings, and recommendation to start therapy. He thought the symptoms were improving and wanted to wait. However, the symptoms persisted and he now comes in with his wife for a discussion. Mr. Bethea has continued to be feeling down and very anxious. He still has fatigue, fevers, chills and sweats which are stable. He has been taking tylenol which brings his temperature down. Appetite is reduced, but not nauseated. Bowels are okay. He is now aware of results of the CT imaging, which were discussed with him in advance of this clinic visit. However, he does not seem to remember all of the conversations.    No abdominopelvic pain, further change in nodes or LE edema. No rashes. Denies productive cough, dysuria, diarrhea, new pains.     REVIEW OF SYSTEMS:  A 12-point review  "of systems is otherwise generally negative.      ALLERGIES:  Atenolol/bee venom.       MEDICATIONS:   1.  Hydrocortisone cream (uses occasionally).   2.  Losartan.   3.  Acetaminophen p.r.n.   4.  Magnesium.   5.  Vitamin D.   6.  Aspirin 81 mg.   7.  Multivitamins.   8.  Finasteride.   9.  Vitamin C.      PHYSICAL EXAMINATION:   VITAL SIGNS:  /64 (BP Location: Right arm, Patient Position: Chair, Cuff Size: Adult Regular)  Pulse 60  Temp 99  F (37.2  C) (Oral)  Resp 16  Ht 1.651 m (5' 5\")  Wt 68.4 kg (150 lb 12.7 oz)  SpO2 97%  BMI 25.09 kg/m2  HEENT:  Anicteric. Oral mucosae - moist, no plaque or ulceration.   NECK:   No nodes   CHEST:  Clear.  AXILLAE:  No nodes.   HEART:  Regular rhythm.  No murmur.   ABDOMEN:  Non tender . Bowels sounds present . No detectable organomegaly or mass.  No tenderness. No right inguinal nodes. Few small 1-2 cm left inguinal nodes and a 4-5 cm left femoral node.  EXTREMITIES:  No LE edema.   SKIN:  No  New lesions.  NEUROLOGY : Significant anxiety     LABORATORY DATA :      Hb 12.9, platelets 289,000, WBC 20,900 (90% neutrophils, 4% lymphocytes)  Electrolyte, calcium and creatinine (0.87) - normal.  Uric acid: 3.9  Liver enzymes, including LDH - normal.    From 03/30/2017:    Blood cultures: negative  Beta-2 microglobulin 2.9  , ESR 43  TSH 0.76  EBV DNA - detected below the reportable range of 500 copies.    3/30/2017:    Lymph node, left femoral, fine needle aspiration:  Atypical cells present, suspicious for recurrent/persistent involvement by lymphoma     Flow Cytometry:    1. There are very rare (48 events, 0.06%) CD30 positive events that have an immunophenotype similar to that described for the patient's T-cell lymphoma.  These findings are suspicious but not definitive for involvement by lymphoma as the number of events is near the limit of detection.     2. Small T-cell populations (2.5% and 1.1%) have only subtle immunophenotypic shifts and are favored to " be reactive, one has an immunophenotype consistent with T-cell large granular lymphocytes.  Upon review of the prior flow study ([id number]), these cell populations were also present at a low percentage.       CT imaging:   Abdomen and pelvis (03/22/2017):     1. New/significantly enlarged left iliac chain lymph nodes suggestive of recurrence of disease.  2. Mildly increased size of pancreatic body cystic lesion which measured 8 mm on 11/19/2015 and measures 10 mm in the current exam. This likely represents a simple cyst or a sidebranch Intraductal papillary mucinous neoplasm.  3. Other incidental findings including enlarged prostate, colonic diverticulosis, liver hemangioma and degenerative changes in the lumbar spine.  4. Small 1 cm soft tissue attenuation lesion in the left inguinal canal is stable since the prior exam and of unknown clinical significance.  5. Multiple basilar pulmonary nodules are new compared to the prior exam. Chest not fully evaluated on this exam.    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  The CT scan provides enough information to target a left inguinofemoral node by FNA. Biopsy confirms recurrence of lymphoma.     We spent approximately 45 minutes with Mr. Bethea and his wife, explaining the findings and their relationship to his symptoms. Also, reminding him of similar circumstances int he past, which responded to brentuximab. In this setting and his very good prior response, we recommended starting another course of therapy. He was very anxious throughout the visit and debated if treatment is worthwhile given his age. We discussed about possible outcomes without therapy including worsening of symptoms and feeling unwell and physical deterioration. His wife was very supportive of trying treatment, but acknowledged that the decision  would be his. We suggested that he consider his options over the weekend and discuss with family. We will be in touch next week to learn his decision, trying to make it clear that  would recommend and that he could start treatment and see how he tolerates it. We discussed his depression and anxiety, which he acknowledges but does not want to pursue counseling or intervention.    Brentuximab is administered intravenously over approximately 30 minutes and given every 3-4 weeks. Since reactions to the antibody-component are possible, premedication is routinely employed. The major toxicities are a moderate risk of myelosuppression, very mild nausea, slight risk of infection and diarrhea. The most common side effect is that of peripheral neuropathy, similar in nature to that seen with vinca alkaloids.       If the patient chooses to proceed, he will have antiemetics, allopurinol for 2 weeks (100 mg/day) and weekly blood counts during this first cycle of treatment. In his case we will administer the second cycle at 4 weeks.    Patient seen and plan discussed with Dr Modi.     SADIA Brock, MS.  Hematology/Oncology Fellow  [facility]  Pager [phone]    Oncology Attending    Patient seen and examined with the Oncology Fellow, Dr. Hdz. Agree with her findings, assessment and plan. After the clinic visit, Mr. Bethea communicated that he would like to proceed with treatment. He is scheduled to begin on Friday, 05/05/2017 with cycle #1.    Eddie Modi MD  Professor of Medicine  Oncology  [facility]  Office: [phone]  Clinic Fax: [phone]        CC:    MD Grey Sanchez MD

## 2017-04-27 NOTE — LETTER
4/27/2017      RE: Bobby Bethea  [address] [facility] [address]         DATE OF VISIT :4/27/17  LAST VISIT:3/30/2017     HISTORY OF PRESENT ILLNESS :  Mr. 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  He has had 3 treatments for the extra cutaneous CD30-positive disease, including -- 12/1998 (CHOP - good response), 2012 (relapse treated with CVP - mixed response), and then 8 cycles of brentuximab vedotin completed on 05/21/2013 . He had a good clinical response including in the nodes and skin with Bretuximab therapy .  Until recently, he has had no evidence of recurrent dorothea disease since the brentuximab. However, in early 03/2017 he began to have fevers and sweats, and a 3-4 cm node in the left inguinal region and a cutaneous nodule above his left knee. An abdominal CT showed new enlarged lymph nodes in the left iliac and inguinofemoral regions. He was seen again on 03/30/2017 to review the results and obtain a FNA of a left groin node, which was positive for CD30+ lymphoma.    INTERVAL HISTORY : After the visit and biopsy on 03/30, Mr. Bethea was informed by telephone on 2 occasions of the biopsy and CT findings, and recommendation to start therapy. He thought the symptoms were improving and wanted to wait. However, the symptoms persisted and he now comes in with his wife for a discussion. Mr. Bethea has continued to be feeling down and very anxious. He still has fatigue, fevers, chills and sweats which are stable. He has been taking tylenol which brings his temperature down. Appetite is reduced, but not nauseated. Bowels are okay. He is now aware of results of the CT imaging, which were discussed with him in advance of this clinic visit. However, he does not seem to remember all of the conversations.    No abdominopelvic pain, further change in nodes or LE edema. No rashes.  "Denies productive cough, dysuria, diarrhea, new pains.     REVIEW OF SYSTEMS:  A 12-point review of systems is otherwise generally negative.      ALLERGIES:  Atenolol/bee venom.       MEDICATIONS:   1.  Hydrocortisone cream (uses occasionally).   2.  Losartan.   3.  Acetaminophen p.r.n.   4.  Magnesium.   5.  Vitamin D.   6.  Aspirin 81 mg.   7.  Multivitamins.   8.  Finasteride.   9.  Vitamin C.      PHYSICAL EXAMINATION:   VITAL SIGNS:  /64 (BP Location: Right arm, Patient Position: Chair, Cuff Size: Adult Regular)  Pulse 60  Temp 99  F (37.2  C) (Oral)  Resp 16  Ht 1.651 m (5' 5\")  Wt 68.4 kg (150 lb 12.7 oz)  SpO2 97%  BMI 25.09 kg/m2  HEENT:  Anicteric. Oral mucosae - moist, no plaque or ulceration.   NECK:   No nodes   CHEST:  Clear.  AXILLAE:  No nodes.   HEART:  Regular rhythm.  No murmur.   ABDOMEN:  Non tender . Bowels sounds present . No detectable organomegaly or mass.  No tenderness. No right inguinal nodes. Few small 1-2 cm left inguinal nodes and a 4-5 cm left femoral node.  EXTREMITIES:  No LE edema.   SKIN:  No  New lesions.  NEUROLOGY : Significant anxiety     LABORATORY DATA :      Hb 12.9, platelets 289,000, WBC 20,900 (90% neutrophils, 4% lymphocytes)  Electrolyte, calcium and creatinine (0.87) - normal.  Uric acid: 3.9  Liver enzymes, including LDH - normal.    From 03/30/2017:    Blood cultures: negative  Beta-2 microglobulin 2.9  , ESR 43  TSH 0.76  EBV DNA - detected below the reportable range of 500 copies.    3/30/2017:    Lymph node, left femoral, fine needle aspiration:  Atypical cells present, suspicious for recurrent/persistent involvement by lymphoma     Flow Cytometry:    1. There are very rare (48 events, 0.06%) CD30 positive events that have an immunophenotype similar to that described for the patient's T-cell lymphoma.  These findings are suspicious but not definitive for involvement by lymphoma as the number of events is near the limit of detection.     2. " Small T-cell populations (2.5% and 1.1%) have only subtle immunophenotypic shifts and are favored to be reactive, one has an immunophenotype consistent with T-cell large granular lymphocytes.  Upon review of the prior flow study ([id number]), these cell populations were also present at a low percentage.       CT imaging:   Abdomen and pelvis (03/22/2017):     1. New/significantly enlarged left iliac chain lymph nodes suggestive of recurrence of disease.  2. Mildly increased size of pancreatic body cystic lesion which measured 8 mm on 11/19/2015 and measures 10 mm in the current exam. This likely represents a simple cyst or a sidebranch Intraductal papillary mucinous neoplasm.  3. Other incidental findings including enlarged prostate, colonic diverticulosis, liver hemangioma and degenerative changes in the lumbar spine.  4. Small 1 cm soft tissue attenuation lesion in the left inguinal canal is stable since the prior exam and of unknown clinical significance.  5. Multiple basilar pulmonary nodules are new compared to the prior exam. Chest not fully evaluated on this exam.    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  The CT scan provides enough information to target a left inguinofemoral node by FNA. Biopsy confirms recurrence of lymphoma.     We spent approximately 45 minutes with Mr. Bethea and his wife, explaining the findings and their relationship to his symptoms. Also, reminding him of similar circumstances int he past, which responded to brentuximab. In this setting and his very good prior response, we recommended starting another course of therapy. He was very anxious throughout the visit and debated if treatment is worthwhile given his age. We discussed about possible outcomes without therapy including worsening of symptoms and feeling unwell and physical  deterioration. His wife was very supportive of trying treatment, but acknowledged that the decision would be his. We suggested that he consider his options over the weekend and discuss with family. We will be in touch next week to learn his decision, trying to make it clear that  would recommend and that he could start treatment and see how he tolerates it. We discussed his depression and anxiety, which he acknowledges but does not want to pursue counseling or intervention.    Brentuximab is administered intravenously over approximately 30 minutes and given every 3-4 weeks. Since reactions to the antibody-component are possible, premedication is routinely employed. The major toxicities are a moderate risk of myelosuppression, very mild nausea, slight risk of infection and diarrhea. The most common side effect is that of peripheral neuropathy, similar in nature to that seen with vinca alkaloids.       If the patient chooses to proceed, he will have antiemetics, allopurinol for 2 weeks (100 mg/day) and weekly blood counts during this first cycle of treatment. In his case we will administer the second cycle at 4 weeks.    Patient seen and plan discussed with Dr Modi.     SADIA Brock, MS.  Hematology/Oncology Fellow  [facility]  Pager [phone]    Oncology Attending    Patient seen and examined with the Oncology Fellow, Dr. Hdz. Agree with her findings, assessment and plan. After the clinic visit, Mr. Bethea communicated that he would like to proceed with treatment. He is scheduled to begin on Friday, 05/05/2017 with cycle #1.    Eddie Modi MD  Professor of Medicine  Oncology  [facility]  Office: [phone]  Clinic Fax: [phone]        CC:    MD Grey Sanchez MD Bruce A. Peterson, MD

## 2017-05-01 ENCOUNTER — CARE COORDINATION (OUTPATIENT)
Dept: ONCOLOGY | Facility: CLINIC | Age: 82
End: 2017-05-01

## 2017-05-01 NOTE — PROGRESS NOTES
Called patient this afternoon per the request of Dr. Modi to see if he has made a decision to start treatment.  Per patient he is wanting to start treatment with Brentuxamab. He is now scheduled for 10 AM labs and 10:30 infusion on Friday 5/5/17.  Patient verbalized understanding and will be here at 10 AM on Friday. Informed patient that I would meet with him to review the common side effects of Brentuxamab prior to him getting his infusion.

## 2017-05-04 ENCOUNTER — CARE COORDINATION (OUTPATIENT)
Dept: ONCOLOGY | Facility: CLINIC | Age: 82
End: 2017-05-04

## 2017-05-04 DIAGNOSIS — C85.99 EXTRANODAL LYMPHOMA (H): Primary | ICD-10-CM

## 2017-05-04 RX ORDER — DIPHENHYDRAMINE HYDROCHLORIDE 50 MG/ML
50 INJECTION INTRAMUSCULAR; INTRAVENOUS
Status: CANCELLED
Start: 2017-05-05

## 2017-05-04 RX ORDER — METHYLPREDNISOLONE SODIUM SUCCINATE 125 MG/2ML
125 INJECTION, POWDER, LYOPHILIZED, FOR SOLUTION INTRAMUSCULAR; INTRAVENOUS
Status: CANCELLED
Start: 2017-05-05

## 2017-05-04 RX ORDER — ALBUTEROL SULFATE 90 UG/1
1-2 AEROSOL, METERED RESPIRATORY (INHALATION)
Status: CANCELLED
Start: 2017-05-05

## 2017-05-04 RX ORDER — LORAZEPAM 2 MG/ML
0.5 INJECTION INTRAMUSCULAR EVERY 4 HOURS PRN
Status: CANCELLED
Start: 2017-05-05

## 2017-05-04 RX ORDER — MEPERIDINE HYDROCHLORIDE 25 MG/ML
25 INJECTION INTRAMUSCULAR; INTRAVENOUS; SUBCUTANEOUS EVERY 30 MIN PRN
Status: CANCELLED | OUTPATIENT
Start: 2017-05-05

## 2017-05-04 RX ORDER — EPINEPHRINE 0.3 MG/.3ML
0.3 INJECTION SUBCUTANEOUS EVERY 5 MIN PRN
Status: CANCELLED | OUTPATIENT
Start: 2017-05-05

## 2017-05-04 RX ORDER — SODIUM CHLORIDE 9 MG/ML
1000 INJECTION, SOLUTION INTRAVENOUS CONTINUOUS PRN
Status: CANCELLED
Start: 2017-05-05

## 2017-05-04 RX ORDER — ACETAMINOPHEN 325 MG/1
650 TABLET ORAL
Status: CANCELLED
Start: 2017-05-05

## 2017-05-04 RX ORDER — ALBUTEROL SULFATE 0.83 MG/ML
2.5 SOLUTION RESPIRATORY (INHALATION)
Status: CANCELLED | OUTPATIENT
Start: 2017-05-05

## 2017-05-04 RX ORDER — DIPHENHYDRAMINE HCL 25 MG
50 CAPSULE ORAL
Status: CANCELLED
Start: 2017-05-05

## 2017-05-04 NOTE — PROGRESS NOTES
Reason for Outgoing call:    Returning patient's call.      Patients Questions/Concerns:    Patient is calling because he is concerned that if he is having fevers like he has been up to 102 that he will not get the chemotherapy.    Nursing Action/Patient Instruction:    Talked to Dr. Modi about the patient's concerns. Explained that Bobby is feeling weak, still with significant night sweats, and fevers up to 102.0  Per Dr. Modi, patient should come in for treatment tomorrow as the symptoms that he is experiencing are d/t his disease.    Patient Response/Evaluation:    Patient verbalized understanding and will be here at the clinic tomorrow for his appointment at 10 AM.

## 2017-05-05 ENCOUNTER — INFUSION THERAPY VISIT (OUTPATIENT)
Dept: ONCOLOGY | Facility: CLINIC | Age: 82
End: 2017-05-05
Attending: INTERNAL MEDICINE
Payer: COMMERCIAL

## 2017-05-05 ENCOUNTER — CARE COORDINATION (OUTPATIENT)
Dept: ONCOLOGY | Facility: CLINIC | Age: 82
End: 2017-05-05

## 2017-05-05 VITALS
RESPIRATION RATE: 18 BRPM | HEART RATE: 54 BPM | TEMPERATURE: 98 F | OXYGEN SATURATION: 97 % | BODY MASS INDEX: 25.63 KG/M2 | DIASTOLIC BLOOD PRESSURE: 56 MMHG | WEIGHT: 154 LBS | SYSTOLIC BLOOD PRESSURE: 124 MMHG

## 2017-05-05 DIAGNOSIS — C85.99 EXTRANODAL LYMPHOMA (H): Primary | ICD-10-CM

## 2017-05-05 LAB
ANION GAP SERPL CALCULATED.3IONS-SCNC: 10 MMOL/L (ref 3–14)
BASOPHILS # BLD AUTO: 0 10E9/L (ref 0–0.2)
BASOPHILS NFR BLD AUTO: 0.2 %
BUN SERPL-MCNC: 14 MG/DL (ref 7–30)
CALCIUM SERPL-MCNC: 8.1 MG/DL (ref 8.5–10.1)
CHLORIDE SERPL-SCNC: 107 MMOL/L (ref 94–109)
CO2 SERPL-SCNC: 24 MMOL/L (ref 20–32)
CREAT SERPL-MCNC: 0.96 MG/DL (ref 0.66–1.25)
DIFFERENTIAL METHOD BLD: ABNORMAL
EOSINOPHIL # BLD AUTO: 0.1 10E9/L (ref 0–0.7)
EOSINOPHIL NFR BLD AUTO: 0.7 %
ERYTHROCYTE [DISTWIDTH] IN BLOOD BY AUTOMATED COUNT: 15.2 % (ref 10–15)
GFR SERPL CREATININE-BSD FRML MDRD: 74 ML/MIN/1.7M2
GLUCOSE SERPL-MCNC: 106 MG/DL (ref 70–99)
HCT VFR BLD AUTO: 39.9 % (ref 40–53)
HGB BLD-MCNC: 13 G/DL (ref 13.3–17.7)
IMM GRANULOCYTES # BLD: 0.2 10E9/L (ref 0–0.4)
IMM GRANULOCYTES NFR BLD: 1.1 %
LYMPHOCYTES # BLD AUTO: 0.8 10E9/L (ref 0.8–5.3)
LYMPHOCYTES NFR BLD AUTO: 4.1 %
MCH RBC QN AUTO: 28.4 PG (ref 26.5–33)
MCHC RBC AUTO-ENTMCNC: 32.6 G/DL (ref 31.5–36.5)
MCV RBC AUTO: 87 FL (ref 78–100)
MONOCYTES # BLD AUTO: 1 10E9/L (ref 0–1.3)
MONOCYTES NFR BLD AUTO: 5.1 %
NEUTROPHILS # BLD AUTO: 17.7 10E9/L (ref 1.6–8.3)
NEUTROPHILS NFR BLD AUTO: 88.8 %
NRBC # BLD AUTO: 0 10*3/UL
NRBC BLD AUTO-RTO: 0 /100
PLATELET # BLD AUTO: 363 10E9/L (ref 150–450)
POTASSIUM SERPL-SCNC: 4 MMOL/L (ref 3.4–5.3)
RBC # BLD AUTO: 4.57 10E12/L (ref 4.4–5.9)
SODIUM SERPL-SCNC: 141 MMOL/L (ref 133–144)
WBC # BLD AUTO: 19.9 10E9/L (ref 4–11)

## 2017-05-05 PROCEDURE — 25000128 H RX IP 250 OP 636: Mod: JW,ZF | Performed by: INTERNAL MEDICINE

## 2017-05-05 PROCEDURE — 80048 BASIC METABOLIC PNL TOTAL CA: CPT | Performed by: INTERNAL MEDICINE

## 2017-05-05 PROCEDURE — 85025 COMPLETE CBC W/AUTO DIFF WBC: CPT | Performed by: INTERNAL MEDICINE

## 2017-05-05 PROCEDURE — 96413 CHEMO IV INFUSION 1 HR: CPT

## 2017-05-05 RX ORDER — LORAZEPAM 0.5 MG/1
0.5 TABLET ORAL EVERY 4 HOURS PRN
Qty: 30 TABLET | Refills: 2 | Status: SHIPPED | OUTPATIENT
Start: 2017-05-05 | End: 2018-06-04

## 2017-05-05 RX ORDER — ALLOPURINOL 100 MG/1
100 TABLET ORAL DAILY
Qty: 14 TABLET | Refills: 0 | Status: SHIPPED | OUTPATIENT
Start: 2017-05-05 | End: 2017-01-01

## 2017-05-05 RX ORDER — PROCHLORPERAZINE MALEATE 5 MG
5 TABLET ORAL EVERY 6 HOURS PRN
Qty: 30 TABLET | Refills: 1 | Status: SHIPPED | OUTPATIENT
Start: 2017-05-05 | End: 2018-06-04

## 2017-05-05 RX ADMIN — BRENTUXIMAB VEDOTIN 123 MG: 50 INJECTION, POWDER, LYOPHILIZED, FOR SOLUTION INTRAVENOUS at 11:08

## 2017-05-05 RX ADMIN — SODIUM CHLORIDE 250 ML: 9 INJECTION, SOLUTION INTRAVENOUS at 11:08

## 2017-05-05 NOTE — PATIENT INSTRUCTIONS
Contact numbers:  Triage Main Line: [phone]  After hours: [phone]    Call with chills and/or temperature greater than or equal to 100.5 and questions or concerns.    If after hours, weekends, or holidays, call main hospital  at  [phone] and ask for Oncology doctor on call.           May 2017   [place] Monday Tuesday Wednesday Thursday Friday Saturday        1     2     3     4     5     [facility]   10:00 AM   (15 min.)   [facility]   [facility] Draw     [facility] ONC INFUSION 60   10:30 AM   (60 min.)    ONCOLOGY INFUSION   [facility] 6       7     8     9     10     11     [facility]    2:00 PM   (15 min.)   [facility]   [facility] Draw 12     13       14     15     16     17     18     [facility]    2:00 PM   (15 min.)   [facility]   [facility] Draw 19     20       21     22     23     24     25     [facility]    2:00 PM   (15 min.)   [facility]   [facility] Draw 26     27       28     29     30     31 June 2017 Sunday Monday Tuesday Wednesday Thursday Friday Saturday                       1     [facility]    1:15 PM   (15 min.)    [facility] DRAW   [facility] Draw     [facility] RETURN    1:25 PM   (50 min.)   Ekaterina Bailey PA-C   [facility]     [facility] ONC INFUSION 60    2:30 PM   (60 min.)    ONCOLOGY INFUSION   [facility] 2     3       4     5     6     7     8     9     10       11     12     13     14     15     16     17       18     19     20     21     22     23     24       25     26     27     28     29     30                       Lab Results:  Recent Results (from the past 12 hour(s))   *CBC with platelets differential    Collection Time: 05/05/17 10:21 AM   Result Value Ref Range    WBC 19.9 (H) 4.0 - 11.0 10e9/L    RBC Count 4.57 4.4 - 5.9 10e12/L     Hemoglobin 13.0 (L) 13.3 - 17.7 g/dL    Hematocrit 39.9 (L) 40.0 - 53.0 %    MCV 87 78 - 100 fl    MCH 28.4 26.5 - 33.0 pg    MCHC 32.6 31.5 - 36.5 g/dL    RDW 15.2 (H) 10.0 - 15.0 %    Platelet Count 363 150 - 450 10e9/L    Diff Method Automated Method     % Neutrophils 88.8 %    % Lymphocytes 4.1 %    % Monocytes 5.1 %    % Eosinophils 0.7 %    % Basophils 0.2 %    % Immature Granulocytes 1.1 %    Nucleated RBCs 0 0 /100    Absolute Neutrophil 17.7 (H) 1.6 - 8.3 10e9/L    Absolute Lymphocytes 0.8 0.8 - 5.3 10e9/L    Absolute Monocytes 1.0 0.0 - 1.3 10e9/L    Absolute Eosinophils 0.1 0.0 - 0.7 10e9/L    Absolute Basophils 0.0 0.0 - 0.2 10e9/L    Abs Immature Granulocytes 0.2 0 - 0.4 10e9/L    Absolute Nucleated RBC 0.0    Basic metabolic panel    Collection Time: 05/05/17 10:21 AM   Result Value Ref Range    Sodium 141 133 - 144 mmol/L    Potassium 4.0 3.4 - 5.3 mmol/L    Chloride 107 94 - 109 mmol/L    Carbon Dioxide 24 20 - 32 mmol/L    Anion Gap 10 3 - 14 mmol/L    Glucose 106 (H) 70 - 99 mg/dL    Urea Nitrogen 14 7 - 30 mg/dL    Creatinine 0.96 0.66 - 1.25 mg/dL    GFR Estimate 74 >60 mL/min/1.7m2    GFR Estimate If Black 89 >60 mL/min/1.7m2    Calcium 8.1 (L) 8.5 - 10.1 mg/dL

## 2017-05-05 NOTE — MR AVS SNAPSHOT
After Visit Summary   5/5/2017    Bobby Bethea    MRN: [id number]           Patient Information     Date Of Birth          7/2/1929        Visit Information        Provider Department      5/5/2017 10:30 AM  30 ATC; [facility] ONCOLOGY INFUSION [facility]        Today's Diagnoses     Extranodal lymphoma (H)    -  1      Care Instructions    Contact numbers:  Triage Main Line: [phone]  After hours: [phone]    Call with chills and/or temperature greater than or equal to 100.5 and questions or concerns.    If after hours, weekends, or holidays, call main hospital  at  [phone] and ask for Oncology doctor on call.           May 2017   [place] Monday Tuesday Wednesday Thursday Friday Saturday        1     2     3     4     5     [facility]   10:00 AM   (15 min.)    [facility] DRAW   [facility] Draw     [facility] ONC INFUSION 60   10:30 AM   (60 min.)    ONCOLOGY INFUSION   [facility] 6       7     8     9     10     11     [facility]    2:00 PM   (15 min.)    [facility] DRAW   [facility] Draw 12     13       14     15     16     17     18     [facility]    2:00 PM   (15 min.)    [facility] DRAW   [facility] Draw 19     20       21     22     23     24     25     [facility]    2:00 PM   (15 min.)    [facility] DRAW   [facility] Draw 26     27       28     29     30     31 June 2017 Sunday Monday Tuesday Wednesday Thursday Friday Saturday                       1     [facility]    1:15 PM   (15 min.)    [facility] DRAW   [facility] Draw     [facility] RETURN    1:25 PM   (50 min.)   Ekaterina Bailey PA-C   [facility]     [facility] ONC INFUSION 60    2:30 PM   (60 min.)    ONCOLOGY INFUSION   [facility] 2     3       4     5     6     7     8     9     10       11     12      13     14     15     16     17       18     19     20     21     22     23     24       25     26     27     28     29     30                       Lab Results:  Recent Results (from the past 12 hour(s))   *CBC with platelets differential    Collection Time: 05/05/17 10:21 AM   Result Value Ref Range    WBC 19.9 (H) 4.0 - 11.0 10e9/L    RBC Count 4.57 4.4 - 5.9 10e12/L    Hemoglobin 13.0 (L) 13.3 - 17.7 g/dL    Hematocrit 39.9 (L) 40.0 - 53.0 %    MCV 87 78 - 100 fl    MCH 28.4 26.5 - 33.0 pg    MCHC 32.6 31.5 - 36.5 g/dL    RDW 15.2 (H) 10.0 - 15.0 %    Platelet Count 363 150 - 450 10e9/L    Diff Method Automated Method     % Neutrophils 88.8 %    % Lymphocytes 4.1 %    % Monocytes 5.1 %    % Eosinophils 0.7 %    % Basophils 0.2 %    % Immature Granulocytes 1.1 %    Nucleated RBCs 0 0 /100    Absolute Neutrophil 17.7 (H) 1.6 - 8.3 10e9/L    Absolute Lymphocytes 0.8 0.8 - 5.3 10e9/L    Absolute Monocytes 1.0 0.0 - 1.3 10e9/L    Absolute Eosinophils 0.1 0.0 - 0.7 10e9/L    Absolute Basophils 0.0 0.0 - 0.2 10e9/L    Abs Immature Granulocytes 0.2 0 - 0.4 10e9/L    Absolute Nucleated RBC 0.0    Basic metabolic panel    Collection Time: 05/05/17 10:21 AM   Result Value Ref Range    Sodium 141 133 - 144 mmol/L    Potassium 4.0 3.4 - 5.3 mmol/L    Chloride 107 94 - 109 mmol/L    Carbon Dioxide 24 20 - 32 mmol/L    Anion Gap 10 3 - 14 mmol/L    Glucose 106 (H) 70 - 99 mg/dL    Urea Nitrogen 14 7 - 30 mg/dL    Creatinine 0.96 0.66 - 1.25 mg/dL    GFR Estimate 74 >60 mL/min/1.7m2    GFR Estimate If Black 89 >60 mL/min/1.7m2    Calcium 8.1 (L) 8.5 - 10.1 mg/dL             Follow-ups after your visit        Your next 10 appointments already scheduled     May 11, 2017  2:00 PM CDT   [facility] Lab Draw with  [facility] DRAW   [facility] Lab Draw ([facility])    [address] [id number] [phone]            May 18, 2017  2:00 PM CDT   [facility] Lab Draw with   MASONIC LAB DRAW   [facility] Lab Draw ([facility])    [address] [address]   [phone]            May 25, 2017  2:00 PM CDT   [facility] Draw with  [facility] DRAW   [facility]    [address] [address]   [phone]            Jun 01, 2017  1:15 PM CDT   [facility] Draw with  [facility] LAB DRAW   [facility]    [address] [address]   [phone]            Jun 01, 2017  1:40 PM CDT   (Arrive by 1:25 PM)   Return Visit with Ekaterina Bailey PA-C   [facility] ([facility])    [address] [place] [address]   [phone]            Jun 01, 2017  2:30 PM CDT   Infusion 60 with  ONCOLOGY INFUSION, [facility] 29 ATC   [facility] ([facility])    [address] [address]   [phone]              Future tests that were ordered for you today     Open Standing Orders        Priority Remaining Interval Expires Ordered    *CBC with platelets differential Routine 24/25 As ordered by Dr. Modi 12/31/2017 5/4/2017    Basic metabolic panel Routine 24/25 As ordered by Dr. Modi 12/31/2017 5/4/2017            Who to contact     If you have questions or need follow up information about today's clinic visit or your schedule please contact [facility] directly at [phone].  Normal or non-critical lab and imaging results will be communicated to you by MyChart, letter or phone within 4 business days after the clinic has received the results. If you do not hear from us within 7 days, please contact the clinic through MyChart or phone. If you have a critical or abnormal lab result, we will notify you by phone  "as soon as possible.  Submit refill requests through [employer] or call your pharmacy and they will forward the refill request to us. Please allow 3 business days for your refill to be completed.          Additional Information About Your Visit        [employer] Information     [employer] lets you send messages to your doctor, view your test results, renew your prescriptions, schedule appointments and more. To sign up, go to [email or web address] . Click on \"Log in\" on the left side of the screen, which will take you to the Welcome page. Then click on \"Sign up Now\" on the right side of the page.     You will be asked to enter the access code listed below, as well as some personal information. Please follow the directions to create your username and password.     Your access code is: [email or web address]  Expires: 2017  4:43 PM     Your access code will  in 90 days. If you need help or a new code, please call your [place] clinic or [phone].        Care EveryWhere ID     This is your Care EveryWhere ID. This could be used by other organizations to access your [place] medical records  [id number]        Your Vitals Were     Pulse Temperature Respirations Pulse Oximetry BMI (Body Mass Index)       54 98  F (36.7  C) (Oral) 18 97% 25.63 kg/m2        Blood Pressure from Last 3 Encounters:   17 124/56   17 122/64   17 155/65    Weight from Last 3 Encounters:   17 69.9 kg (154 lb)   17 68.4 kg (150 lb 12.7 oz)   17 70.3 kg (154 lb 15.7 oz)              We Performed the Following     *CBC with platelets differential     Basic metabolic panel          Today's Medication Changes          These changes are accurate as of: 17 11:30 AM.  If you have any questions, ask your nurse or doctor.               Start taking these medicines.        Dose/Directions    allopurinol 100 MG tablet   Commonly known as:  ZYLOPRIM   Used for:  Extranodal lymphoma (H)        Dose:  100 mg   Take 1 tablet " (100 mg) by mouth daily   Quantity:  14 tablet   Refills:  0       LORazepam 0.5 MG tablet   Commonly known as:  ATIVAN   Used for:  Extranodal lymphoma (H)        Dose:  0.5 mg   Take 1 tablet (0.5 mg) by mouth every 4 hours as needed (Anxiety, Nausea/Vomiting or Sleep)   Quantity:  30 tablet   Refills:  2       prochlorperazine 5 MG tablet   Commonly known as:  COMPAZINE   Used for:  Extranodal lymphoma (H)        Dose:  5 mg   Take 1 tablet (5 mg) by mouth every 6 hours as needed (Nausea/Vomiting)   Quantity:  30 tablet   Refills:  1            Where to get your medicines      Some of these will need a paper prescription and others can be bought over the counter.  Ask your nurse if you have questions.     Bring a paper prescription for each of these medications     allopurinol 100 MG tablet    LORazepam 0.5 MG tablet    prochlorperazine 5 MG tablet                Primary Care Provider Office Phone # Fax #    [facility] [phone]       [address] [place] [address]        Thank you!     Thank you for choosing [facility]  for your care. Our goal is always to provide you with excellent care. Hearing back from our patients is one way we can continue to improve our services. Please take a few minutes to complete the written survey that you may receive in the mail after your visit with us. Thank you!             Your Updated Medication List - Protect others around you: Learn how to safely use, store and throw away your medicines at www.disposemymeds.org.          This list is accurate as of: 5/5/17 11:30 AM.  Always use your most recent med list.                   Brand Name Dispense Instructions for use    allopurinol 100 MG tablet    ZYLOPRIM    14 tablet    Take 1 tablet (100 mg) by mouth daily       aspirin 81 MG tablet      Take 1 tablet by mouth daily.       DAILY MULTIVITAMIN PO      Take  by mouth daily.       fluocinonide 0.05 % cream    LIDEX    120 g     Apply topically 2 times daily Apply topically twice daily as needed for itchy rash on body.       hydrocortisone 2.5 % cream     454 g    Apply  topically 2 times daily. Mix with vanicream       ibuprofen 600 MG tablet    ADVIL/MOTRIN     Take 600 mg by mouth       LORazepam 0.5 MG tablet    ATIVAN    30 tablet    Take 1 tablet (0.5 mg) by mouth every 4 hours as needed (Anxiety, Nausea/Vomiting or Sleep)       losartan 25 MG tablet    COZAAR    30 tablet    Take 1 tablet by mouth daily.       magnesium 250 MG tablet      Take 1 tablet by mouth daily.       prochlorperazine 5 MG tablet    COMPAZINE    30 tablet    Take 1 tablet (5 mg) by mouth every 6 hours as needed (Nausea/Vomiting)       PROSCAR 5 MG tablet   Generic drug:  finasteride      Take 5 mg by mouth daily.       TYLENOL PO      Take 325 mg by mouth daily as needed       Vitamin C 500 MG Caps      Take 250 mg by mouth daily.       vitamin D 1000 UNITS capsule      Take 1 capsule by mouth daily.

## 2017-05-05 NOTE — NURSING NOTE
Chief Complaint   Patient presents with     Blood Draw     Pt is here for a lab draw for his infusion. Pt with Extranodal lymphoma      Pt. Mentioned that he had a fever this morning of 101.5. He said he took some Tylenol and temp. Was normal here. He told me that he has been running a temp for about 2 months. I called infusion RN and asked if I should do blood culture. She read MD notes and said it would be ok NOT do draw a blood culture.     Reshma Shepard MA

## 2017-05-05 NOTE — PROGRESS NOTES
Met with Bobby this morning in infusion to discuss and review with him the common side effects of Brentuximab.  Provided patient and his wife with information from [email or web address]   Patient has received this medication in the past and from what he can remember he tolerated it well.  Talked about when to call into the triage line and after hours line.   Discussed with him that since he has a lot of symptoms currently d/t his disease right now that it is important to monitor them and inform us if they worsen.  Patient and wife verbalized understanding. Informed him that Dr. Modi would like him to get weekly labs drawn with the first cycle to monitor his counts.  Told him that we would call him weekly to discuss his labs and see how he is doing. Provided patient with his schedule for the first cycle.  Patient and wife verbalized understanding of plan and were grateful for the information provided.

## 2017-05-08 ENCOUNTER — CARE COORDINATION (OUTPATIENT)
Dept: ONCOLOGY | Facility: CLINIC | Age: 82
End: 2017-05-08

## 2017-05-08 NOTE — PROGRESS NOTES
"Called patient this morning to follow up with him after receiving his first dose of Brentuximab this past Friday 5/5/17.   Per Patient he is doing well. He states that he hasn't felt good for months and today is no different. His complaints are with his red itchy eyes and dry hands.   For which he stated \"these have nothing to do with my chemo but they are driving me nuts\". Patient had questions about his Allopurinol and if it could cause diarrhea.   He stopped it as he got diarrhea upon starting it. Discussed with him that the diarrhea could be from the chemotherapy but not so much the Allopurinol. Dicussed with him the importance of Allopurinol and the risks of not taking it. Patient verbalized understanding and will restart the Allopurinol and work on increasing his fluid intake. He will also look into some antidiarrheals if the diarrhea continues.   Encouraged patient to call if he has any questions, comments, or concerns. He will now be getting his labs done at his local [facility] vs driving into the cities to come to [facility].   Informed him that I would call him this Thursday after the results of the his labs are in. Patient verbalized understanding and was appreciative of the call/follow up.   "

## 2017-05-11 NOTE — PROGRESS NOTES
"Bobby called in this morning to report that he is have \"apple sauce\" like diarrhea about 4-6 episodes in a day. He states that he can not stand to urinate because he will also have \"squirts\" of diarrhea.  He has not taken anything at this time. Informed Bobby that he needed to  some Imodium or some Lomotil and start to use one of the to help with his diarrhea. Per Bobby he has both of these medications at home and is familiar with them and how to use them. Patient seem hesitant to start taking them but stressed to him the importance of controlling the diarrhea so that he does not get dehydrated. Talked with him about foods to avoid that would make the diarrhea worse and talked about the importance of getting 6-8 8oz glasses of fluid a day. Talked about the signs of dehydration as well. Bobby also pointed out that he has noticed some increase in his edema in his feet as well as the return of the red \"blatches\" randomly on his body. Talked to Bobby about maybe having him come down here to the clinic today to be elevated in light of all these new changes but patient politely declined. He would like to go get his labs drawn at 2 PM as scheduled at his local  clinic and go from there. Informed him that I would follow up with him once the labs are resulted but stressed the importance of calling if things get any worse. Patient verbalized understanding and was grateful for the time. He will let us know if he is unable to keep his lab appointment today.   "

## 2017-05-11 NOTE — TELEPHONE ENCOUNTER
Follow up call to Bobby regarding his diarrhea.  Pt has had 5 very soft stools today.  He hasn't started taking Immodium but I encouraged him to take 2-4 mg this evening.  We reviewed his fluid intake.  He drinks 1.5 cups of coffee and has a cup of milk with his cereal.  He is trying to drink multiple 8 ounce cups throughout the day.   I let him know his blood counts were good today.  His basic metabolic panel is still pending.  He will call us tomorrow with any further questions or concerns.

## 2017-05-15 NOTE — PROGRESS NOTES
Patient called int his morning to give a weekend update report.  Per patient after taking his 2 imodium on Friday he has had no diarrhea and stool is not back to normal but not loose anymore.  He is trying to continue to push fluids but it is challenging for him.  He is noticing an increase in his ankle swelling and has noted some new swelling in his left hand and fingers.  He states that the swelling is significant enough so that it is hard for him to make a fist with his left hand.  He did have his IV in his left hand so there is a slight concern for possible blood clot.   Updated Dr. Modi who would like him to come in and see something this Thursday when he comes in for his labs.   Will have patient see Racheal Ni after lab appointment on 5/18/17 to assess his left arm.  Patient updated on plan and verbalized understanding. He will call if the swelling gets worse or if he has any further questions.

## 2017-05-18 NOTE — NURSING NOTE
Chief Complaint   Patient presents with     Blood Draw     labs only     Vitals and labs done peripherally.  See doc flow sheets for details.  Piedad Anaya CMA

## 2017-05-18 NOTE — LETTER
5/18/2017       RE: Bobby Bethea  [address] [facility] [id number]     Dear Colleague,    Thank you for referring your patient, Bobby Bethea, to the [facility]. Please see a copy of my visit note below.    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 He is currently undergoing treatment with brentuximab which started on 5/5/2017. Please see previous notes for further details on the patient's history. He comes in today for evaluation of IV site for possible blood clot and some new edema in his hands and lower extremities.    Interval History:  Bobby came to his appointment today alone. Since treatment was started he has had resolution of his night sweats, chills and fevers. He had some diarrhea following the infusion. He took a imodium and his diarrhea resolved. He has been hungry and he feels he has been eating well. He has been drinking at least 1 quart of fluids a day. He feels he is not able to completely empty his bladder when he urinates. This has been a long standing issue and he knows that his prostate is enlarged. He feels weak but that also has been a long standing problem. He is GOMEZ when walking up stairs or long distances. He does admit to be anxious.     Review of Systems:  Patient denies any of the following except if noted above: fevers, chills, difficulty with energy, vision or hearing changes, chest pain, dyspnea, abdominal pain, nausea, vomiting, diarrhea, constipation, urinary concerns, headaches, numbness, tingling, issues with sleep or mood. He also denies lumps, bumps, rashes or skin lesions, bleeding or bruising issues.    Current Outpatient Prescriptions   Medication Sig Dispense Refill     allopurinol (ZYLOPRIM) 100 MG tablet Take 1 tablet (100 mg) by mouth daily 14 tablet 0     LORazepam (ATIVAN) 0.5 MG tablet Take 1  "tablet (0.5 mg) by mouth every 4 hours as needed (Anxiety, Nausea/Vomiting or Sleep) 30 tablet 2     prochlorperazine (COMPAZINE) 5 MG tablet Take 1 tablet (5 mg) by mouth every 6 hours as needed (Nausea/Vomiting) 30 tablet 1     ibuprofen (ADVIL/MOTRIN) 600 MG tablet Take 600 mg by mouth       losartan (COZAAR) 25 MG tablet Take 1 tablet by mouth daily. 30 tablet 3     Acetaminophen (TYLENOL PO) Take 325 mg by mouth daily as needed        magnesium 250 MG tablet Take 1 tablet by mouth daily.       Cholecalciferol (VITAMIN D) 1000 UNITS capsule Take 1 capsule by mouth daily.       aspirin 81 MG tablet Take 1 tablet by mouth daily.       Multiple Vitamin (DAILY MULTIVITAMIN PO) Take  by mouth daily.       finasteride (PROSCAR) 5 MG tablet Take 5 mg by mouth daily.       Ascorbic Acid (VITAMIN C) 500 MG CAPS Take 250 mg by mouth daily.       fluocinonide (LIDEX) 0.05 % cream Apply topically 2 times daily Apply topically twice daily as needed for itchy rash on body. (Patient not taking: Reported on 4/27/2017) 120 g 5     hydrocortisone 2.5 % cream Apply  topically 2 times daily. Mix with vanicream (Patient not taking: Reported on 4/27/2017) 454 g 3       Physical Examination:  General: The patient is a pleasant male in no acute distress.  /61  Pulse 91  Temp 98.3  F (36.8  C) (Oral)  Resp 18  Ht 1.676 m (5' 6\")  Wt 67 kg (147 lb 11.3 oz)  SpO2 94%  BMI 23.84 kg/m2  Wt Readings from Last 10 Encounters:   05/18/17 67 kg (147 lb 11.3 oz)   05/05/17 69.9 kg (154 lb)   04/27/17 68.4 kg (150 lb 12.7 oz)   03/30/17 70.3 kg (154 lb 15.7 oz)   03/16/17 70.5 kg (155 lb 6.8 oz)   11/09/16 70 kg (154 lb 5.2 oz)   08/22/16 70.2 kg (154 lb 12.8 oz)   07/06/16 70.2 kg (154 lb 11.2 oz)   03/09/16 72.2 kg (159 lb 1.6 oz)   11/11/15 73.6 kg (162 lb 4.8 oz)     HEENT: EOMI, PERRL. Sclerae are anicteric. Oral mucosa is pink and moist with no lesions or thrush.   Lymph: Neck is supple with no lymphadenopathy in the cervical or " supraclavicular areas. He does have enlarged inguinal nodes on the right 1-2 cm on the left 4-5 cm  Heart: Regular rate and rhythm.   Lungs: Clear to auscultation bilaterally.   Abdomen: Bowel sounds present, soft, non tender with no palpable hepatosplenomegaly or masses.   Extremities: No lower extremity edema noted bilaterally. Edema in his hands/digits L > R.  Neuro: Cranial nerves II through XII are grossly intact.  Skin:  Rash on palm of hands, left ankle and left lower leg - see pictures below. Old IV site on right fore arm medial no evidence of phlebitis or thrombus        Laboratory Data:  Results for orders placed or performed in visit on 05/18/17 (from the past 24 hour(s))   *CBC with platelets differential   Result Value Ref Range    WBC 7.4 4.0 - 11.0 10e9/L    RBC Count 4.30 (L) 4.4 - 5.9 10e12/L    Hemoglobin 12.1 (L) 13.3 - 17.7 g/dL    Hematocrit 38.1 (L) 40.0 - 53.0 %    MCV 89 78 - 100 fl    MCH 28.1 26.5 - 33.0 pg    MCHC 31.8 31.5 - 36.5 g/dL    RDW 16.0 (H) 10.0 - 15.0 %    Platelet Count 353 150 - 450 10e9/L    Diff Method Automated Method     % Neutrophils 72.4 %    % Lymphocytes 14.0 %    % Monocytes 8.9 %    % Eosinophils 2.6 %    % Basophils 1.3 %    % Immature Granulocytes 0.8 %    Nucleated RBCs 0 0 /100    Absolute Neutrophil 5.4 1.6 - 8.3 10e9/L    Absolute Lymphocytes 1.0 0.8 - 5.3 10e9/L    Absolute Monocytes 0.7 0.0 - 1.3 10e9/L    Absolute Eosinophils 0.2 0.0 - 0.7 10e9/L    Absolute Basophils 0.1 0.0 - 0.2 10e9/L    Abs Immature Granulocytes 0.1 0 - 0.4 10e9/L    Absolute Nucleated RBC 0.0    Basic metabolic panel   Result Value Ref Range    Sodium 142 133 - 144 mmol/L    Potassium 4.1 3.4 - 5.3 mmol/L    Chloride 106 94 - 109 mmol/L    Carbon Dioxide 27 20 - 32 mmol/L    Anion Gap 9 3 - 14 mmol/L    Glucose 134 (H) 70 - 99 mg/dL    Urea Nitrogen 14 7 - 30 mg/dL    Creatinine 0.92 0.66 - 1.25 mg/dL    GFR Estimate 77 >60 mL/min/1.7m2    GFR Estimate If Black >90    GFR Calc   >60 mL/min/1.7m2    Calcium 8.4 (L) 8.5 - 10.1 mg/dL   Magnesium   Result Value Ref Range    Magnesium 2.5 (H) 1.6 - 2.3 mg/dL   Phosphorus   Result Value Ref Range    Phosphorus 2.3 (L) 2.5 - 4.5 mg/dL   Uric acid   Result Value Ref Range    Uric Acid 3.0 (L) 3.5 - 7.2 mg/dL     Assessment and Plan:  1. Skin Rash/Edema He had not notice the rash. No itching. Rash on left LE raised, skin is dry. Recommended that he use Vanicream or Eucerin cream on that area.   Rash on chang side of hands L > R with swelling, flush rash. Recommended HCT cream to this area.   -Could be related to allopurinol as that is a new medication to him although he has not rash on his trunk. Related to Brentuximab? We will monitor with upcoming infusions. Discussed this with Dr. Modi and he agreed with the plan. If the rash develops does not resolve may want to discuss with his dermatologist, Dr. Portillo.        2. Oncology Treatment started on  5/5/17 with cycle every 28 days. He has had resolution of his night sweats, fevers and chills. Inguinal nodes are stable.   He is planned to see Ekaterina Bailey We will have him seen prior to the next dose.        [facility] [id number] [place] [address]  [phone]    The patient was seen in conjunction with ADRIAN Rowell who served as a scribe for today's visit. I have reviewed the note and agree with the above findings and plan. TW      Again, thank you for allowing me to participate in the care of your patient.      Sincerely,    ROBEL Orozco CNP

## 2017-05-18 NOTE — NURSING NOTE
"Oncology Rooming Note    May 18, 2017 2:23 PM   Bobby Bethea is a 87 year old male who presents for:    Chief Complaint   Patient presents with     Blood Draw     labs only     Oncology Clinic Visit     Lymphoma F/U     Initial Vitals: /61  Pulse 91  Temp 98.3  F (36.8  C) (Oral)  Resp 18  Ht 1.676 m (5' 6\")  Wt 67 kg (147 lb 11.3 oz)  SpO2 94%  BMI 23.84 kg/m2 Estimated body mass index is 23.84 kg/(m^2) as calculated from the following:    Height as of this encounter: 1.676 m (5' 6\").    Weight as of this encounter: 67 kg (147 lb 11.3 oz). Body surface area is 1.77 meters squared.  No Pain (0) Comment: Data Unavailable   No LMP for male patient.  Allergies reviewed: Yes  Medications reviewed: Yes    Medications: Medication refills not needed today.  Pharmacy name entered into [employer]: [id number] [place], [place] - [address]    Clinical concerns: no clinical concerns provider was notified.    7 minutes for nursing intake (face to face time)     Grecia Mcgill CMA              "

## 2017-05-18 NOTE — PROGRESS NOTES
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 He is currently undergoing treatment with brentuximab which started on 5/5/2017. Please see previous notes for further details on the patient's history. He comes in today for evaluation of IV site for possible blood clot and some new edema in his hands and lower extremities.    Interval History:  Jack came to his appointment today alone. Since treatment was started he has had resolution of his night sweats, chills and fevers. He had some diarrhea following the infusion. He took a imodium and his diarrhea resolved. He has been hungry and he feels he has been eating well. He has been drinking at least 1 quart of fluids a day. He feels he is not able to completely empty his bladder when he urinates. This has been a long standing issue and he knows that his prostate is enlarged. He feels weak but that also has been a long standing problem. He is GOMEZ when walking up stairs or long distances. He does admit to be anxious.     Review of Systems:  Patient denies any of the following except if noted above: fevers, chills, difficulty with energy, vision or hearing changes, chest pain, dyspnea, abdominal pain, nausea, vomiting, diarrhea, constipation, urinary concerns, headaches, numbness, tingling, issues with sleep or mood. He also denies lumps, bumps, rashes or skin lesions, bleeding or bruising issues.    Current Outpatient Prescriptions   Medication Sig Dispense Refill     allopurinol (ZYLOPRIM) 100 MG tablet Take 1 tablet (100 mg) by mouth daily 14 tablet 0     LORazepam (ATIVAN) 0.5 MG tablet Take 1 tablet (0.5 mg) by mouth every 4 hours as needed (Anxiety, Nausea/Vomiting or Sleep) 30 tablet 2     prochlorperazine (COMPAZINE) 5 MG tablet Take 1 tablet (5 mg) by mouth every 6 hours as needed (Nausea/Vomiting) 30 tablet 1     ibuprofen (ADVIL/MOTRIN)  "600 MG tablet Take 600 mg by mouth       losartan (COZAAR) 25 MG tablet Take 1 tablet by mouth daily. 30 tablet 3     Acetaminophen (TYLENOL PO) Take 325 mg by mouth daily as needed        magnesium 250 MG tablet Take 1 tablet by mouth daily.       Cholecalciferol (VITAMIN D) 1000 UNITS capsule Take 1 capsule by mouth daily.       aspirin 81 MG tablet Take 1 tablet by mouth daily.       Multiple Vitamin (DAILY MULTIVITAMIN PO) Take  by mouth daily.       finasteride (PROSCAR) 5 MG tablet Take 5 mg by mouth daily.       Ascorbic Acid (VITAMIN C) 500 MG CAPS Take 250 mg by mouth daily.       fluocinonide (LIDEX) 0.05 % cream Apply topically 2 times daily Apply topically twice daily as needed for itchy rash on body. (Patient not taking: Reported on 4/27/2017) 120 g 5     hydrocortisone 2.5 % cream Apply  topically 2 times daily. Mix with vanicream (Patient not taking: Reported on 4/27/2017) 454 g 3       Physical Examination:  General: The patient is a pleasant male in no acute distress.  /61  Pulse 91  Temp 98.3  F (36.8  C) (Oral)  Resp 18  Ht 1.676 m (5' 6\")  Wt 67 kg (147 lb 11.3 oz)  SpO2 94%  BMI 23.84 kg/m2  Wt Readings from Last 10 Encounters:   05/18/17 67 kg (147 lb 11.3 oz)   05/05/17 69.9 kg (154 lb)   04/27/17 68.4 kg (150 lb 12.7 oz)   03/30/17 70.3 kg (154 lb 15.7 oz)   03/16/17 70.5 kg (155 lb 6.8 oz)   11/09/16 70 kg (154 lb 5.2 oz)   08/22/16 70.2 kg (154 lb 12.8 oz)   07/06/16 70.2 kg (154 lb 11.2 oz)   03/09/16 72.2 kg (159 lb 1.6 oz)   11/11/15 73.6 kg (162 lb 4.8 oz)     HEENT: EOMI, PERRL. Sclerae are anicteric. Oral mucosa is pink and moist with no lesions or thrush.   Lymph: Neck is supple with no lymphadenopathy in the cervical or supraclavicular areas. He does have enlarged inguinal nodes on the right 1-2 cm on the left 4-5 cm  Heart: Regular rate and rhythm.   Lungs: Clear to auscultation bilaterally.   Abdomen: Bowel sounds present, soft, non tender with no palpable " hepatosplenomegaly or masses.   Extremities: No lower extremity edema noted bilaterally. Edema in his hands/digits L > R.  Neuro: Cranial nerves II through XII are grossly intact.  Skin:  Rash on palm of hands, left ankle and left lower leg - see pictures below. Old IV site on right fore arm medial no evidence of phlebitis or thrombus        Laboratory Data:  Results for orders placed or performed in visit on 05/18/17 (from the past 24 hour(s))   *CBC with platelets differential   Result Value Ref Range    WBC 7.4 4.0 - 11.0 10e9/L    RBC Count 4.30 (L) 4.4 - 5.9 10e12/L    Hemoglobin 12.1 (L) 13.3 - 17.7 g/dL    Hematocrit 38.1 (L) 40.0 - 53.0 %    MCV 89 78 - 100 fl    MCH 28.1 26.5 - 33.0 pg    MCHC 31.8 31.5 - 36.5 g/dL    RDW 16.0 (H) 10.0 - 15.0 %    Platelet Count 353 150 - 450 10e9/L    Diff Method Automated Method     % Neutrophils 72.4 %    % Lymphocytes 14.0 %    % Monocytes 8.9 %    % Eosinophils 2.6 %    % Basophils 1.3 %    % Immature Granulocytes 0.8 %    Nucleated RBCs 0 0 /100    Absolute Neutrophil 5.4 1.6 - 8.3 10e9/L    Absolute Lymphocytes 1.0 0.8 - 5.3 10e9/L    Absolute Monocytes 0.7 0.0 - 1.3 10e9/L    Absolute Eosinophils 0.2 0.0 - 0.7 10e9/L    Absolute Basophils 0.1 0.0 - 0.2 10e9/L    Abs Immature Granulocytes 0.1 0 - 0.4 10e9/L    Absolute Nucleated RBC 0.0    Basic metabolic panel   Result Value Ref Range    Sodium 142 133 - 144 mmol/L    Potassium 4.1 3.4 - 5.3 mmol/L    Chloride 106 94 - 109 mmol/L    Carbon Dioxide 27 20 - 32 mmol/L    Anion Gap 9 3 - 14 mmol/L    Glucose 134 (H) 70 - 99 mg/dL    Urea Nitrogen 14 7 - 30 mg/dL    Creatinine 0.92 0.66 - 1.25 mg/dL    GFR Estimate 77 >60 mL/min/1.7m2    GFR Estimate If Black >90   GFR Calc   >60 mL/min/1.7m2    Calcium 8.4 (L) 8.5 - 10.1 mg/dL   Magnesium   Result Value Ref Range    Magnesium 2.5 (H) 1.6 - 2.3 mg/dL   Phosphorus   Result Value Ref Range    Phosphorus 2.3 (L) 2.5 - 4.5 mg/dL   Uric acid   Result Value Ref  Range    Uric Acid 3.0 (L) 3.5 - 7.2 mg/dL     Assessment and Plan:  1. Skin Rash/Edema He had not notice the rash. No itching. Rash on left LE raised, skin is dry. Recommended that he use Vanicream or Eucerin cream on that area.   Rash on chang side of hands L > R with swelling, flush rash. Recommended HCT cream to this area.   -Could be related to allopurinol as that is a new medication to him although he has not rash on his trunk. Related to Brentuximab? We will monitor with upcoming infusions. Discussed this with Dr. Modi and he agreed with the plan. If the rash develops does not resolve may want to discuss with his dermatologist, Dr. Portillo.        2. Oncology Treatment started on  5/5/17 with cycle every 28 days. He has had resolution of his night sweats, fevers and chills. Inguinal nodes are stable.   He is planned to see Ekaterina Bailey We will have him seen prior to the next dose.        [facility]  [address] [place] [address] [phone]    The patient was seen in conjunction with ADRIAN Rowell who served as a scribe for today's visit. I have reviewed the note and agree with the above findings and plan. TW

## 2017-05-18 NOTE — MR AVS SNAPSHOT
After Visit Summary   5/18/2017    Bobby Bethea    MRN: [id number]           Patient Information     Date Of Birth          7/2/1929        Visit Information        Provider Department      5/18/2017 2:40 PM Racheal Ni APRN CNP [facility]        Today's Diagnoses     Extranodal lymphoma (H)    -  1       Follow-ups after your visit        Your next 10 appointments already scheduled     Jun 01, 2017  1:15 PM CDT   [facility] Draw with  [facility]   [facility] ([facility])    [address] [place] [address] [id number] [phone]            Jun 01, 2017  1:40 PM CDT   (Arrive by 1:25 PM)   Return Visit with Ekaterina Bailey PA-C   [facility] ([facility])    [address] [place] [address] [id number] [phone]            Jun 01, 2017  2:30 PM CDT   Infusion 60 with  ONCOLOGY INFUSION, UC 29 ATC   [facility] ([facility])    [address] [place] [address] [id number] [phone]              Who to contact     If you have questions or need follow up information about today's clinic visit or your schedule please contact [facility] directly at [phone].  Normal or non-critical lab and imaging results will be communicated to you by MyChart, letter or phone within 4 business days after the clinic has received the results. If you do not hear from us within 7 days, please contact the clinic through MyChart or phone. If you have a critical or abnormal lab result, we will notify you by phone as soon as possible.  Submit refill requests through Vyteris or call your pharmacy and they will forward the refill request to us. Please allow 3 business days for your refill to be completed.          Additional Information About Your Visit        MyChart Information      "[facility] lets you send messages to your doctor, view your test results, renew your prescriptions, schedule appointments and more. To sign up, go to [email or web address] . Click on \"Log in\" on the left side of the screen, which will take you to the Welcome page. Then click on \"Sign up Now\" on the right side of the page.     You will be asked to enter the access code listed below, as well as some personal information. Please follow the directions to create your username and password.     Your access code is: [email or web address]  Expires: 2017  4:43 PM     Your access code will  in 90 days. If you need help or a new code, please call your [place] clinic or [phone].        Care EveryWhere ID     This is your Care EveryWhere ID. This could be used by other organizations to access your [place] medical records  [id number]        Your Vitals Were     Pulse Temperature Respirations Height Pulse Oximetry BMI (Body Mass Index)    91 98.3  F (36.8  C) (Oral) 18 1.676 m (5' 6\") 94% 23.84 kg/m2       Blood Pressure from Last 3 Encounters:   No data found for BP    Weight from Last 3 Encounters:   No data found for Wt              Today, you had the following     No orders found for display       Primary Care Provider Office Phone # Fax #    [facility] [phone]       [address] [facility]        Thank you!     Thank you for choosing [facility]  for your care. Our goal is always to provide you with excellent care. Hearing back from our patients is one way we can continue to improve our services. Please take a few minutes to complete the written survey that you may receive in the mail after your visit with us. Thank you!             Your Updated Medication List - Protect others around you: Learn how to safely use, store and throw away your medicines at www.disposemymeds.org.          This list is accurate as of: 17 11:59 PM.  Always use your most " recent med list.                   Brand Name Dispense Instructions for use    allopurinol 100 MG tablet    ZYLOPRIM    14 tablet    Take 1 tablet (100 mg) by mouth daily       aspirin 81 MG tablet      Take 1 tablet by mouth daily.       DAILY MULTIVITAMIN PO      Take  by mouth daily.       fluocinonide 0.05 % cream    LIDEX    120 g    Apply topically 2 times daily Apply topically twice daily as needed for itchy rash on body.       hydrocortisone 2.5 % cream     454 g    Apply  topically 2 times daily. Mix with vanicream       ibuprofen 600 MG tablet    ADVIL/MOTRIN     Take 600 mg by mouth       LORazepam 0.5 MG tablet    ATIVAN    30 tablet    Take 1 tablet (0.5 mg) by mouth every 4 hours as needed (Anxiety, Nausea/Vomiting or Sleep)       losartan 25 MG tablet    COZAAR    30 tablet    Take 1 tablet by mouth daily.       magnesium 250 MG tablet      Take 1 tablet by mouth daily.       prochlorperazine 5 MG tablet    COMPAZINE    30 tablet    Take 1 tablet (5 mg) by mouth every 6 hours as needed (Nausea/Vomiting)       PROSCAR 5 MG tablet   Generic drug:  finasteride      Take 5 mg by mouth daily.       TYLENOL PO      Take 325 mg by mouth daily as needed       Vitamin C 500 MG Caps      Take 250 mg by mouth daily.       vitamin D 1000 UNITS capsule      Take 1 capsule by mouth daily.

## 2017-05-26 NOTE — PROGRESS NOTES
Called Oliver to review his labs from yesterday with him.  Patient is doing well at this time. His greatest complaint is his dry hands at this time. He states that they are getting better but it's a slow process.  He has no questions or concerns. He is scheduled next week to return to clinic for his next cycle of therapy. He will call if he has any questions, comments, or concerns.     Results for OLIVER NICOLAS (MRN [id number]) as of 5/26/2017 09:03   Ref. Range 5/25/2017 13:45   WBC Latest Ref Range: 4.0 - 11.0 10e9/L 7.0   Hemoglobin Latest Ref Range: 13.3 - 17.7 g/dL 13.1 (L)   Hematocrit Latest Ref Range: 40.0 - 53.0 % 40.3   Platelet Count Latest Ref Range: 150 - 450 10e9/L 282   RBC Count Latest Ref Range: 4.4 - 5.9 10e12/L 4.56   MCV Latest Ref Range: 78 - 100 fl 88   MCH Latest Ref Range: 26.5 - 33.0 pg 28.7   MCHC Latest Ref Range: 31.5 - 36.5 g/dL 32.5   RDW Latest Ref Range: 10.0 - 15.0 % 17.8 (H)   Diff Method Unknown Automated Method   % Neutrophils Latest Units: % 69.4   % Lymphocytes Latest Units: % 20.3   % Monocytes Latest Units: % 8.7   % Eosinophils Latest Units: % 1.0   % Basophils Latest Units: % 0.6   Absolute Neutrophil Latest Ref Range: 1.6 - 8.3 10e9/L 4.9   Absolute Lymphocytes Latest Ref Range: 0.8 - 5.3 10e9/L 1.4   Absolute Monocytes Latest Ref Range: 0.0 - 1.3 10e9/L 0.6   Absolute Eosinophils Latest Ref Range: 0.0 - 0.7 10e9/L 0.1   Absolute Basophils Latest Ref Range: 0.0 - 0.2 10e9/L 0.0

## 2017-06-01 NOTE — Clinical Note
6/1/2017       RE: Bobby Bethea  [address] [id number]     Dear Colleague,    Thank you for referring your patient, Bobby Bethea, to the [facility]. Please see a copy of my visit note below.    No notes on file    Again, thank you for allowing me to participate in the care of your patient.      Sincerely,    Ekaterina Bailey PA-C

## 2017-06-01 NOTE — NURSING NOTE
"Oncology Rooming Note    June 1, 2017 2:03 PM   Bobby Bethea is a 87 year old male who presents for:    Chief Complaint   Patient presents with     Blood Draw     Pt with hx of lymphoma, piv placed, labs collected.     Initial Vitals: /79  Pulse 70  Temp 98.2  F (36.8  C) (Oral)  Resp 16  Ht 1.676 m (5' 5.98\")  Wt 67.1 kg (147 lb 14.4 oz)  SpO2 98%  BMI 23.88 kg/m2 Estimated body mass index is 23.88 kg/(m^2) as calculated from the following:    Height as of this encounter: 1.676 m (5' 5.98\").    Weight as of this encounter: 67.1 kg (147 lb 14.4 oz). Body surface area is 1.77 meters squared.  No Pain (0) Comment: Data Unavailable   No LMP for male patient.  Allergies reviewed: Yes  Medications reviewed: No    Medications: Medication refills not needed today.  Pharmacy name entered into [employer]: [id number] [place], [place] - [address]    Clinical concerns: Pt declined to review medications stating that there have been no changes. Ekaterina Bailey was NOT notified.    7 minutes for nursing intake (face to face time)     Rosario Hall LPN              "

## 2017-06-01 NOTE — NURSING NOTE
12-Lead EKG was completed on Pt.  Procedure was completed with no complications.    Rosario Hall LPN

## 2017-06-01 NOTE — LETTER
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  He has had 3 treatments for the extra cutaneous CD30-positive disease, including -- 12/1998 (CHOP - good response), 2012 (relapse treated with CVP - mixed response), and then 8 cycles of brentuximab vedotin completed on 05/21/2013 . He had a good clinical response including in the nodes and skin with Bretuximab therapy .  Until recently, he has had no evidence of recurrent dorothea disease since the brentuximab. However, in early 03/2017 he began to have fevers and sweats, and a 3-4 cm node in the left inguinal region and a cutaneous nodule above his left knee. An abdominal CT showed new enlarged lymph nodes in the left iliac and inguinofemoral regions. He was seen again on 03/30/2017 to review the results and obtain a FNA of a left groin node, which was positive for CD30+ lymphoma.  He is currently undergoing treatment with brentuximab which started on 5/5/2017. He developed a rash following cycle 1, possibly due to allopurinol versus brentuximab. Please see previous notes for further details on the patient's history. He comes in today for routine follow up prior to cycle 2.    Interval History:  Patient reports that he has had intermittent dyspnea that he feels at the base of his throat. He denies any dysphagia. He denies any cough or chest pain. He reports a smoking history of 1.5 ppd for 35-40 years, quitting in 1985. He does have a chronic irregular heartbeat. He is unsure the name of the irregular heartbeat. He has felt more fatigued recently and has had some hair loss. He did not have hair loss when he previously received brentuximab. He has been  less physically active over the past 3 months. He did have hand and ankle swelling that resolved over the past few days. He reports the skin on his hands peeled on 5/29 and has been doing fine since then. He developed night sweats in early March, which have since improved. He has completed the course of allopurinol. He has chronically slept poorly and is typically up 2-3 times at night. He typically takes a 45 minute nap after lunch. He reports good food and fluid intake. He thinks the lymph node in his left groin has gotten a little smaller. He is using Eucerin for his dry skin. He denies other concerns.     Current Outpatient Prescriptions   Medication Sig Dispense Refill     allopurinol (ZYLOPRIM) 100 MG tablet Take 1 tablet (100 mg) by mouth daily 14 tablet 0     LORazepam (ATIVAN) 0.5 MG tablet Take 1 tablet (0.5 mg) by mouth every 4 hours as needed (Anxiety, Nausea/Vomiting or Sleep) 30 tablet 2     prochlorperazine (COMPAZINE) 5 MG tablet Take 1 tablet (5 mg) by mouth every 6 hours as needed (Nausea/Vomiting) 30 tablet 1     ibuprofen (ADVIL/MOTRIN) 600 MG tablet Take 600 mg by mouth       fluocinonide (LIDEX) 0.05 % cream Apply topically 2 times daily Apply topically twice daily as needed for itchy rash on body. (Patient not taking: Reported on 4/27/2017) 120 g 5     hydrocortisone 2.5 % cream Apply  topically 2 times daily. Mix with vanicream (Patient not taking: Reported on 4/27/2017) 454 g 3     losartan (COZAAR) 25 MG tablet Take 1 tablet by mouth daily. 30 tablet 3     Acetaminophen (TYLENOL PO) Take 325 mg by mouth daily as needed        magnesium 250 MG tablet Take 1 tablet by mouth daily.       Cholecalciferol (VITAMIN D) 1000 UNITS capsule Take 1 capsule by mouth daily.       aspirin 81 MG tablet Take 1 tablet by mouth daily.       Multiple Vitamin (DAILY MULTIVITAMIN PO) Take  by mouth daily.       finasteride (PROSCAR) 5 MG tablet Take 5 mg by mouth daily.       Ascorbic Acid (VITAMIN C) 500 MG  "CAPS Take 250 mg by mouth daily.       Physical Examination:  General: The patient is a pleasant male in no acute distress.  /79  Pulse 70  Temp 98.2  F (36.8  C) (Oral)  Resp 16  Ht 1.676 m (5' 5.98\")  Wt 67.1 kg (147 lb 14.4 oz)  SpO2 98%  BMI 23.88 kg/m2  Wt Readings from Last 10 Encounters:   06/01/17 67.1 kg (147 lb 14.4 oz)   05/18/17 67 kg (147 lb 11.3 oz)   05/05/17 69.9 kg (154 lb)   04/27/17 68.4 kg (150 lb 12.7 oz)   03/30/17 70.3 kg (154 lb 15.7 oz)   03/16/17 70.5 kg (155 lb 6.8 oz)   11/09/16 70 kg (154 lb 5.2 oz)   08/22/16 70.2 kg (154 lb 12.8 oz)   07/06/16 70.2 kg (154 lb 11.2 oz)   03/09/16 72.2 kg (159 lb 1.6 oz)   HEENT: Hair thinning. EOMI, PERRL. Sclerae are anicteric. Oral mucosa is pink and moist with no lesions or thrush.   Lymph: Neck is supple with no lymphadenopathy in the cervical or supraclavicular areas. He does have enlarged inguinal nodes on the right 1 cm on the left 4 cm  Heart: Regular rate and rhythm.   Lungs: Clear to auscultation bilaterally.   Abdomen: Bowel sounds present, soft, non tender with no palpable hepatosplenomegaly or masses.   Extremities: No lower or upper extremity edema noted bilaterally.   Neuro: Cranial nerves II through XII are grossly intact.  Skin:  Rash resolved. Skin appears generally dry.    Laboratory Data:   6/1/2017 13:56   Sodium 142   Potassium 4.0   Chloride 108   Carbon Dioxide 26   Urea Nitrogen 17   Creatinine 0.84   GFR Estimate 86   GFR Estimate If Black >90...   Calcium 8.6   Anion Gap 8   Glucose 104 (H)   WBC 9.4   Hemoglobin 13.0 (L)   Hematocrit 40.1   Platelet Count 255   RBC Count 4.55   MCV 88   MCH 28.6   MCHC 32.4   RDW 17.6 (H)   Diff Method Automated Method   % Neutrophils 76.7   % Lymphocytes 13.8   % Monocytes 7.8   % Eosinophils 1.0   % Basophils 0.5   % Immature Granulocytes 0.2   Nucleated RBCs 0   Absolute Neutrophil 7.2   Absolute Lymphocytes 1.3   Absolute Monocytes 0.7   Absolute Eosinophils 0.1   Absolute " Basophils 0.1   Abs Immature Granulocytes 0.0   Absolute Nucleated RBC 0.0     Assessment and Plan:  Anaplastic large cell lymphoma. He started on brentuximab on 5/5/17. He tolerated the first cycle fairly well, but did develop a rash, which has since resolved and has noticed some hair loss. We reviewed that brentuximab is reported to cause hair loss in 13-14% of patients. His night sweats have resolved and the inguinal lymph nodes seems a bit smaller. He is doing okay today and will continue with cycle 2 today. He will follow up with Dr. Modi in 3 weeks with cycle 3. I also recommended he schedule a routine follow up with his dermatologist. He will call sooner for concerns.     Dyspnea. EKG today shows occasional PVC's, which does not explain his symptoms. His lungs are clear on exam today. He has a distant history of heavy smoking. His last chest imaging in 11/2015 showed emphysematous changes. He is not on any controller inhalers. I suspect his dyspnea is multifactorial with the emphysematous changes and deconditioning. He declined a referral to cancer rehab or PT. He does have a treadmill and weights at home. I encouraged him to resume light exercise at home. To evaluate further the potential need for inhalers versus other causes of his dyspnea, I recommended he schedule a f/u with his PCP.    Ekaterina Bailey PA-C  [facility]  [address] [place] [address] [phone]

## 2017-06-01 NOTE — PROGRESS NOTES
Infusion Nursing Note:  Bobby Bethea presents today for Day 1 Cycle 6 Brentuximab.    Patient seen by provider today: Yes: MILTON Gandara    Note: Per MILTON Gandara: Please share information with patient: EKG looks good with occasional PVC. The EKG does not explain patient's SOB.  Patient should follow-up with his PCP for further evaluation. Writer discussed the above information with patient, who verbalized understanding.     Intravenous Access:  Peripheral IV placed.    Treatment Conditions:  Lab Results   Component Value Date    HGB 13.0 06/01/2017     Lab Results   Component Value Date    WBC 9.4 06/01/2017      Lab Results   Component Value Date    ANEU 7.2 06/01/2017     Lab Results   Component Value Date     06/01/2017      Lab Results   Component Value Date     06/01/2017                   Lab Results   Component Value Date    POTASSIUM 4.0 06/01/2017           Lab Results   Component Value Date    MAG 2.5 05/18/2017            Lab Results   Component Value Date    CR 0.84 06/01/2017                   Lab Results   Component Value Date    VINICIO 8.6 06/01/2017                Lab Results   Component Value Date    BILITOTAL 1.3 04/27/2017           Lab Results   Component Value Date    ALBUMIN 2.8 04/27/2017                    Lab Results   Component Value Date    ALT 16 04/27/2017           Lab Results   Component Value Date    AST 11 04/27/2017     Results reviewed, labs MET treatment parameters, ok to proceed with treatment.    Post Infusion Assessment:  Patient tolerated infusion without incident.  Blood return noted pre and post infusion.  Access discontinued per protocol.    Discharge Plan:   Patient declined prescription refills.  Copy of AVS reviewed with patient.  Patient will return in 4 weeks for next appointment. Appointment to be scheduled, patient aware.   Patient discharged in stable condition accompanied by: self and son.  Departure Mode: Ambulatory.    Kenya Gardiner  RN

## 2017-06-01 NOTE — NURSING NOTE
Chief Complaint   Patient presents with     Blood Draw     Pt with hx of lymphoma, piv placed, labs collected.

## 2017-06-01 NOTE — MR AVS SNAPSHOT
"              After Visit Summary   2017    Bobby Bethea    MRN: [id number]           Patient Information     Date Of Birth          1929        Visit Information        Provider Department      2017 1:40 PM Ekaterina Bailey PA-C [facility]        Today's Diagnoses     Extranodal lymphoma (H)    -  1    Irregular heartbeat           Follow-ups after your visit        Who to contact     If you have questions or need follow up information about today's clinic visit or your schedule please contact [facility] directly at [phone].  Normal or non-critical lab and imaging results will be communicated to you by Oxtoxhart, letter or phone within 4 business days after the clinic has received the results. If you do not hear from us within 7 days, please contact the clinic through Oxtoxhart or phone. If you have a critical or abnormal lab result, we will notify you by phone as soon as possible.  Submit refill requests through ieCrowd or call your pharmacy and they will forward the refill request to us. Please allow 3 business days for your refill to be completed.          Additional Information About Your Visit        MyChart Information     [employer]d lets you send messages to your doctor, view your test results, renew your prescriptions, schedule appointments and more. To sign up, go to [email or web address] . Click on \"Log in\" on the left side of the screen, which will take you to the Welcome page. Then click on \"Sign up Now\" on the right side of the page.     You will be asked to enter the access code listed below, as well as some personal information. Please follow the directions to create your username and password.     Your access code is: [email or web address]  Expires: 2017  4:43 PM     Your access code will  in 90 days. If you need help or a new code, please call your [place] clinic or [phone].        Care EveryWhere ID     This is your Care " "EveryWhere ID. This could be used by other organizations to access your [place] medical records  [id number]        Your Vitals Were     Pulse Temperature Respirations Height Pulse Oximetry BMI (Body Mass Index)    70 98.2  F (36.8  C) (Oral) 16 1.676 m (5' 5.98\") 98% 23.88 kg/m2       Blood Pressure from Last 3 Encounters:   06/01/17 133/79   05/18/17 142/61   05/05/17 124/56    Weight from Last 3 Encounters:   06/01/17 67.1 kg (147 lb 14.4 oz)   05/18/17 67 kg (147 lb 11.3 oz)   05/05/17 69.9 kg (154 lb)              We Performed the Following     EKG 12-lead complete w/read - Clinics        Primary Care Provider Office Phone # Fax #    [facility] [phone]       [address] [place] [address]        Thank you!     Thank you for choosing [facility]  for your care. Our goal is always to provide you with excellent care. Hearing back from our patients is one way we can continue to improve our services. Please take a few minutes to complete the written survey that you may receive in the mail after your visit with us. Thank you!             Your Updated Medication List - Protect others around you: Learn how to safely use, store and throw away your medicines at www.disposemymeds.org.          This list is accurate as of: 6/1/17 11:59 PM.  Always use your most recent med list.                   Brand Name Dispense Instructions for use    allopurinol 100 MG tablet    ZYLOPRIM    14 tablet    Take 1 tablet (100 mg) by mouth daily       aspirin 81 MG tablet      Take 1 tablet by mouth daily.       DAILY MULTIVITAMIN PO      Take  by mouth daily.       fluocinonide 0.05 % cream    LIDEX    120 g    Apply topically 2 times daily Apply topically twice daily as needed for itchy rash on body.       hydrocortisone 2.5 % cream     454 g    Apply  topically 2 times daily. Mix with vanicream       ibuprofen 600 MG tablet    ADVIL/MOTRIN     Take 600 mg by mouth       " LORazepam 0.5 MG tablet    ATIVAN    30 tablet    Take 1 tablet (0.5 mg) by mouth every 4 hours as needed (Anxiety, Nausea/Vomiting or Sleep)       losartan 25 MG tablet    COZAAR    30 tablet    Take 1 tablet by mouth daily.       magnesium 250 MG tablet      Take 1 tablet by mouth daily.       prochlorperazine 5 MG tablet    COMPAZINE    30 tablet    Take 1 tablet (5 mg) by mouth every 6 hours as needed (Nausea/Vomiting)       PROSCAR 5 MG tablet   Generic drug:  finasteride      Take 5 mg by mouth daily.       TYLENOL PO      Take 325 mg by mouth daily as needed       Vitamin C 500 MG Caps      Take 250 mg by mouth daily.       vitamin D 1000 UNITS capsule      Take 1 capsule by mouth daily.

## 2017-06-01 NOTE — MR AVS SNAPSHOT
After Visit Summary   6/1/2017    Bobby Bethea    MRN: [id number]           Patient Information     Date Of Birth          7/2/1929        Visit Information        Provider Department      6/1/2017 2:30 PM  29 ATC;  ONCOLOGY INFUSION [facility]        Today's Diagnoses     Extranodal lymphoma (H)    -  1      Care Instructions    Contact Numbers  [facility]: [phone]  (Choose Option 3 for triage RN)  After Hours: [phone]    Call triage with chills and/or temperature greater than or equal to 100.5, uncontrolled nausea/vomiting, diarrhea, constipation, dizziness, shortness of breath, chest pain, bleeding, unexplained bruising, or any new/concerning symptoms, questions/concerns.     If after hours, weekends, or holidays, call the main clinic number. Calls will be forwarded to the hospital , please ask for the adult oncology doctor on call.     If you are having any concerning symptoms or wish to speak to a provider before your next infusion visit, please call your care coordinator or triage to notify them so we can adequately serve you.     If you need a refill on a narcotic prescription, please call triage or your care coordinator before your infusion appointment.             June 2017 Sunday Monday Tuesday Wednesday Thursday Friday Saturday                       1     [facility]    1:15 PM   (15 min.)   [facility] LAB DRAW   [facility] Lab Draw     [facility] RETURN    1:25 PM   (50 min.)   Ekaterina Bailey PA-C   [facility] ONC INFUSION 60    2:30 PM   (60 min.)    ONCOLOGY INFUSION   [facility] 2     3       4     5     6     7     8     9     10       11     12     13     14     15     16     17       18     19     20     21     22     23     24       25     26     27     28     29     30                     July 2017 Sunday Monday Tuesday Wednesday Thursday Friday Saturday                                  1       2  Happy Birthday!     3     4     5     6     7     8       9     10     11     12     13     14     15       16     17     18     19     20     21     22       23     24     25     26     27     28     29       30     31                                           Lab Results:  Recent Results (from the past 12 hour(s))   CBC with platelets differential    Collection Time: 06/01/17  1:56 PM   Result Value Ref Range    WBC 9.4 4.0 - 11.0 10e9/L    RBC Count 4.55 4.4 - 5.9 10e12/L    Hemoglobin 13.0 (L) 13.3 - 17.7 g/dL    Hematocrit 40.1 40.0 - 53.0 %    MCV 88 78 - 100 fl    MCH 28.6 26.5 - 33.0 pg    MCHC 32.4 31.5 - 36.5 g/dL    RDW 17.6 (H) 10.0 - 15.0 %    Platelet Count 255 150 - 450 10e9/L    Diff Method Automated Method     % Neutrophils 76.7 %    % Lymphocytes 13.8 %    % Monocytes 7.8 %    % Eosinophils 1.0 %    % Basophils 0.5 %    % Immature Granulocytes 0.2 %    Nucleated RBCs 0 0 /100    Absolute Neutrophil 7.2 1.6 - 8.3 10e9/L    Absolute Lymphocytes 1.3 0.8 - 5.3 10e9/L    Absolute Monocytes 0.7 0.0 - 1.3 10e9/L    Absolute Eosinophils 0.1 0.0 - 0.7 10e9/L    Absolute Basophils 0.1 0.0 - 0.2 10e9/L    Abs Immature Granulocytes 0.0 0 - 0.4 10e9/L    Absolute Nucleated RBC 0.0    Basic metabolic panel    Collection Time: 06/01/17  1:56 PM   Result Value Ref Range    Sodium 142 133 - 144 mmol/L    Potassium 4.0 3.4 - 5.3 mmol/L    Chloride 108 94 - 109 mmol/L    Carbon Dioxide 26 20 - 32 mmol/L    Anion Gap 8 3 - 14 mmol/L    Glucose 104 (H) 70 - 99 mg/dL    Urea Nitrogen 17 7 - 30 mg/dL    Creatinine 0.84 0.66 - 1.25 mg/dL    GFR Estimate 86 >60 mL/min/1.7m2    GFR Estimate If Black >90   GFR Calc   >60 mL/min/1.7m2    Calcium 8.6 8.5 - 10.1 mg/dL   EKG 12-lead complete w/read - Clinics    Collection Time: 06/01/17  2:51 PM   Result Value Ref Range    Interpretation ECG Click View Image link to view waveform and result                Follow-ups  "after your visit        Who to contact     If you have questions or need follow up information about today's clinic visit or your schedule please contact [facility] directly at [phone].  Normal or non-critical lab and imaging results will be communicated to you by MyChart, letter or phone within 4 business days after the clinic has received the results. If you do not hear from us within 7 days, please contact the clinic through MyChart or phone. If you have a critical or abnormal lab result, we will notify you by phone as soon as possible.  Submit refill requests through [employer] or call your pharmacy and they will forward the refill request to us. Please allow 3 business days for your refill to be completed.          Additional Information About Your Visit        [employer] Information     [employer] lets you send messages to your doctor, view your test results, renew your prescriptions, schedule appointments and more. To sign up, go to [email or web address] . Click on \"Log in\" on the left side of the screen, which will take you to the Welcome page. Then click on \"Sign up Now\" on the right side of the page.     You will be asked to enter the access code listed below, as well as some personal information. Please follow the directions to create your username and password.     Your access code is: [email or web address]  Expires: 2017  4:43 PM     Your access code will  in 90 days. If you need help or a new code, please call your [facility] clinic or [phone].        Care EveryWhere ID     This is your Care EveryWhere ID. This could be used by other organizations to access your [facility] medical records  [id number]         Blood Pressure from Last 3 Encounters:   17 133/79   17 142/61   17 124/56    Weight from Last 3 Encounters:   17 67.1 kg (147 lb 14.4 oz)   17 67 kg (147 lb 11.3 oz)   17 69.9 kg (154 lb)              We Performed the Following     Basic " metabolic panel     CBC with platelets differential        Primary Care Provider Office Phone # Fax #    [facility] [phone]       [address] [facility] [place]        Thank you!     Thank you for choosing [facility]  for your care. Our goal is always to provide you with excellent care. Hearing back from our patients is one way we can continue to improve our services. Please take a few minutes to complete the written survey that you may receive in the mail after your visit with us. Thank you!             Your Updated Medication List - Protect others around you: Learn how to safely use, store and throw away your medicines at www.disposemymeds.org.          This list is accurate as of: 6/1/17  4:06 PM.  Always use your most recent med list.                   Brand Name Dispense Instructions for use    allopurinol 100 MG tablet    ZYLOPRIM    14 tablet    Take 1 tablet (100 mg) by mouth daily       aspirin 81 MG tablet      Take 1 tablet by mouth daily.       DAILY MULTIVITAMIN PO      Take  by mouth daily.       fluocinonide 0.05 % cream    LIDEX    120 g    Apply topically 2 times daily Apply topically twice daily as needed for itchy rash on body.       hydrocortisone 2.5 % cream     454 g    Apply  topically 2 times daily. Mix with vanicream       ibuprofen 600 MG tablet    ADVIL/MOTRIN     Take 600 mg by mouth       LORazepam 0.5 MG tablet    ATIVAN    30 tablet    Take 1 tablet (0.5 mg) by mouth every 4 hours as needed (Anxiety, Nausea/Vomiting or Sleep)       losartan 25 MG tablet    COZAAR    30 tablet    Take 1 tablet by mouth daily.       magnesium 250 MG tablet      Take 1 tablet by mouth daily.       prochlorperazine 5 MG tablet    COMPAZINE    30 tablet    Take 1 tablet (5 mg) by mouth every 6 hours as needed (Nausea/Vomiting)       PROSCAR 5 MG tablet   Generic drug:  finasteride      Take 5 mg by mouth daily.       TYLENOL PO      Take 325 mg by  mouth daily as needed       Vitamin C 500 MG Caps      Take 250 mg by mouth daily.       vitamin D 1000 UNITS capsule      Take 1 capsule by mouth daily.

## 2017-06-01 NOTE — PATIENT INSTRUCTIONS
Contact Numbers  [facility]: [phone]  (Choose Option 3 for triage RN)  After Hours: [phone]    Call triage with chills and/or temperature greater than or equal to 100.5, uncontrolled nausea/vomiting, diarrhea, constipation, dizziness, shortness of breath, chest pain, bleeding, unexplained bruising, or any new/concerning symptoms, questions/concerns.     If after hours, weekends, or holidays, call the main clinic number. Calls will be forwarded to the hospital , please ask for the adult oncology doctor on call.     If you are having any concerning symptoms or wish to speak to a provider before your next infusion visit, please call your care coordinator or triage to notify them so we can adequately serve you.     If you need a refill on a narcotic prescription, please call triage or your care coordinator before your infusion appointment.             June 2017 Sunday Monday Tuesday Wednesday Thursday Friday Saturday                       1     [facility]   (15 min.)   [facility] LAB DRAW   [facility] Lab Draw     P RETURN    1:25 PM   (50 min.)   Ekaterina Bailey PA-C   [facility] ONC INFUSION 60    2:30 PM   (60 min.)    ONCOLOGY INFUSION   [facility] 2     3       4     5     6     7     8     9     10       11     12     13     14     15     16     17       18     19     20     21     22     23     24       25     26     27     28     29     30                     July 2017 Sunday Monday Tuesday Wednesday Thursday Friday Saturday                                 1       2  Happy Birthday!     3     4     5     6     7     8       9     10     11     12     13     14     15       16     17     18     19     20     21     22       23     24     25     26     27     28     29       30     31                                           Lab Results:  Recent Results (from the past 12 hour(s))   CBC with  platelets differential    Collection Time: 06/01/17  1:56 PM   Result Value Ref Range    WBC 9.4 4.0 - 11.0 10e9/L    RBC Count 4.55 4.4 - 5.9 10e12/L    Hemoglobin 13.0 (L) 13.3 - 17.7 g/dL    Hematocrit 40.1 40.0 - 53.0 %    MCV 88 78 - 100 fl    MCH 28.6 26.5 - 33.0 pg    MCHC 32.4 31.5 - 36.5 g/dL    RDW 17.6 (H) 10.0 - 15.0 %    Platelet Count 255 150 - 450 10e9/L    Diff Method Automated Method     % Neutrophils 76.7 %    % Lymphocytes 13.8 %    % Monocytes 7.8 %    % Eosinophils 1.0 %    % Basophils 0.5 %    % Immature Granulocytes 0.2 %    Nucleated RBCs 0 0 /100    Absolute Neutrophil 7.2 1.6 - 8.3 10e9/L    Absolute Lymphocytes 1.3 0.8 - 5.3 10e9/L    Absolute Monocytes 0.7 0.0 - 1.3 10e9/L    Absolute Eosinophils 0.1 0.0 - 0.7 10e9/L    Absolute Basophils 0.1 0.0 - 0.2 10e9/L    Abs Immature Granulocytes 0.0 0 - 0.4 10e9/L    Absolute Nucleated RBC 0.0    Basic metabolic panel    Collection Time: 06/01/17  1:56 PM   Result Value Ref Range    Sodium 142 133 - 144 mmol/L    Potassium 4.0 3.4 - 5.3 mmol/L    Chloride 108 94 - 109 mmol/L    Carbon Dioxide 26 20 - 32 mmol/L    Anion Gap 8 3 - 14 mmol/L    Glucose 104 (H) 70 - 99 mg/dL    Urea Nitrogen 17 7 - 30 mg/dL    Creatinine 0.84 0.66 - 1.25 mg/dL    GFR Estimate 86 >60 mL/min/1.7m2    GFR Estimate If Black >90   GFR Calc   >60 mL/min/1.7m2    Calcium 8.6 8.5 - 10.1 mg/dL   EKG 12-lead complete w/read - Clinics    Collection Time: 06/01/17  2:51 PM   Result Value Ref Range    Interpretation ECG Click View Image link to view waveform and result

## 2017-06-11 NOTE — PROGRESS NOTES
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  He has had 3 treatments for the extra cutaneous CD30-positive disease, including -- 12/1998 (CHOP - good response), 2012 (relapse treated with CVP - mixed response), and then 8 cycles of brentuximab vedotin completed on 05/21/2013 . He had a good clinical response including in the nodes and skin with Bretuximab therapy .  Until recently, he has had no evidence of recurrent dorothea disease since the brentuximab. However, in early 03/2017 he began to have fevers and sweats, and a 3-4 cm node in the left inguinal region and a cutaneous nodule above his left knee. An abdominal CT showed new enlarged lymph nodes in the left iliac and inguinofemoral regions. He was seen again on 03/30/2017 to review the results and obtain a FNA of a left groin node, which was positive for CD30+ lymphoma.  He is currently undergoing treatment with brentuximab which started on 5/5/2017. He developed a rash following cycle 1, possibly due to allopurinol versus brentuximab. Please see previous notes for further details on the patient's history. He comes in today for routine follow up prior to cycle 2.    Interval History:  Patient reports that he has had intermittent dyspnea that he feels at the base of his throat. He denies any dysphagia. He denies any cough or chest pain. He reports a smoking history of 1.5 ppd for 35-40 years, quitting in 1985. He does have a chronic irregular heartbeat. He is unsure the name of the irregular heartbeat. He has felt more fatigued recently and has had some hair loss. He did not have hair loss when he previously received brentuximab. He has been less physically active over the past 3 months. He did have hand and ankle swelling that resolved  over the past few days. He reports the skin on his hands peeled on 5/29 and has been doing fine since then. He developed night sweats in early March, which have since improved. He has completed the course of allopurinol. He has chronically slept poorly and is typically up 2-3 times at night. He typically takes a 45 minute nap after lunch. He reports good food and fluid intake. He thinks the lymph node in his left groin has gotten a little smaller. He is using Eucerin for his dry skin. He denies other concerns.     Current Outpatient Prescriptions   Medication Sig Dispense Refill     allopurinol (ZYLOPRIM) 100 MG tablet Take 1 tablet (100 mg) by mouth daily 14 tablet 0     LORazepam (ATIVAN) 0.5 MG tablet Take 1 tablet (0.5 mg) by mouth every 4 hours as needed (Anxiety, Nausea/Vomiting or Sleep) 30 tablet 2     prochlorperazine (COMPAZINE) 5 MG tablet Take 1 tablet (5 mg) by mouth every 6 hours as needed (Nausea/Vomiting) 30 tablet 1     ibuprofen (ADVIL/MOTRIN) 600 MG tablet Take 600 mg by mouth       fluocinonide (LIDEX) 0.05 % cream Apply topically 2 times daily Apply topically twice daily as needed for itchy rash on body. (Patient not taking: Reported on 4/27/2017) 120 g 5     hydrocortisone 2.5 % cream Apply  topically 2 times daily. Mix with vanicream (Patient not taking: Reported on 4/27/2017) 454 g 3     losartan (COZAAR) 25 MG tablet Take 1 tablet by mouth daily. 30 tablet 3     Acetaminophen (TYLENOL PO) Take 325 mg by mouth daily as needed        magnesium 250 MG tablet Take 1 tablet by mouth daily.       Cholecalciferol (VITAMIN D) 1000 UNITS capsule Take 1 capsule by mouth daily.       aspirin 81 MG tablet Take 1 tablet by mouth daily.       Multiple Vitamin (DAILY MULTIVITAMIN PO) Take  by mouth daily.       finasteride (PROSCAR) 5 MG tablet Take 5 mg by mouth daily.       Ascorbic Acid (VITAMIN C) 500 MG CAPS Take 250 mg by mouth daily.       Physical Examination:  General: The patient is a pleasant  "male in no acute distress.  /79  Pulse 70  Temp 98.2  F (36.8  C) (Oral)  Resp 16  Ht 1.676 m (5' 5.98\")  Wt 67.1 kg (147 lb 14.4 oz)  SpO2 98%  BMI 23.88 kg/m2  Wt Readings from Last 10 Encounters:   06/01/17 67.1 kg (147 lb 14.4 oz)   05/18/17 67 kg (147 lb 11.3 oz)   05/05/17 69.9 kg (154 lb)   04/27/17 68.4 kg (150 lb 12.7 oz)   03/30/17 70.3 kg (154 lb 15.7 oz)   03/16/17 70.5 kg (155 lb 6.8 oz)   11/09/16 70 kg (154 lb 5.2 oz)   08/22/16 70.2 kg (154 lb 12.8 oz)   07/06/16 70.2 kg (154 lb 11.2 oz)   03/09/16 72.2 kg (159 lb 1.6 oz)   HEENT: Hair thinning. EOMI, PERRL. Sclerae are anicteric. Oral mucosa is pink and moist with no lesions or thrush.   Lymph: Neck is supple with no lymphadenopathy in the cervical or supraclavicular areas. He does have enlarged inguinal nodes on the right 1 cm on the left 4 cm  Heart: Regular rate and rhythm.   Lungs: Clear to auscultation bilaterally.   Abdomen: Bowel sounds present, soft, non tender with no palpable hepatosplenomegaly or masses.   Extremities: No lower or upper extremity edema noted bilaterally.   Neuro: Cranial nerves II through XII are grossly intact.  Skin:  Rash resolved. Skin appears generally dry.    Laboratory Data:   6/1/2017 13:56   Sodium 142   Potassium 4.0   Chloride 108   Carbon Dioxide 26   Urea Nitrogen 17   Creatinine 0.84   GFR Estimate 86   GFR Estimate If Black >90...   Calcium 8.6   Anion Gap 8   Glucose 104 (H)   WBC 9.4   Hemoglobin 13.0 (L)   Hematocrit 40.1   Platelet Count 255   RBC Count 4.55   MCV 88   MCH 28.6   MCHC 32.4   RDW 17.6 (H)   Diff Method Automated Method   % Neutrophils 76.7   % Lymphocytes 13.8   % Monocytes 7.8   % Eosinophils 1.0   % Basophils 0.5   % Immature Granulocytes 0.2   Nucleated RBCs 0   Absolute Neutrophil 7.2   Absolute Lymphocytes 1.3   Absolute Monocytes 0.7   Absolute Eosinophils 0.1   Absolute Basophils 0.1   Abs Immature Granulocytes 0.0   Absolute Nucleated RBC 0.0     Assessment and " Plan:  Anaplastic large cell lymphoma. He started on brentuximab on 5/5/17. He tolerated the first cycle fairly well, but did develop a rash, which has since resolved and has noticed some hair loss. We reviewed that brentuximab is reported to cause hair loss in 13-14% of patients. His night sweats have resolved and the inguinal lymph nodes seems a bit smaller. He is doing okay today and will continue with cycle 2 today. He will follow up with Dr. Modi in 3 weeks with cycle 3. I also recommended he schedule a routine follow up with his dermatologist. He will call sooner for concerns.     Dyspnea. EKG today shows occasional PVC's, which does not explain his symptoms. His lungs are clear on exam today. He has a distant history of heavy smoking. His last chest imaging in 11/2015 showed emphysematous changes. He is not on any controller inhalers. I suspect his dyspnea is multifactorial with the emphysematous changes and deconditioning. He declined a referral to cancer rehab or PT. He does have a treadmill and weights at home. I encouraged him to resume light exercise at home. To evaluate further the potential need for inhalers versus other causes of his dyspnea, I recommended he schedule a f/u with his PCP.    Ekaterina Bailey PA-C  [facility]  [address] [place] [address] [phone]

## 2017-06-22 NOTE — MR AVS SNAPSHOT
"              After Visit Summary   6/22/2017    Bobby Bethea    MRN: [id number]           Patient Information     Date Of Birth          7/2/1929        Visit Information        Provider Department      6/22/2017 1:30 PM Eddie Modi MD [facility]        Today's Diagnoses     Extranodal lymphoma (H)    -  1       Follow-ups after your visit        Follow-up notes from your care team     Return see note below.      Your next 10 appointments already scheduled     Jun 28, 2017  2:00 PM CDT   [facility] Draw with  [facility] DRAW   [facility] Lab Draw ([facility])    [address] [place] [id number] [phone]            Jun 28, 2017  2:30 PM CDT   Infusion 60 with UC ONCOLOGY INFUSION, UC 29 ATC   [facility] ([facility])    [address] [place] [id number] [phone]              Who to contact     If you have questions or need follow up information about today's clinic visit or your schedule please contact [facility] directly at [phone].  Normal or non-critical lab and imaging results will be communicated to you by MyChart, letter or phone within 4 business days after the clinic has received the results. If you do not hear from us within 7 days, please contact the clinic through MyChart or phone. If you have a critical or abnormal lab result, we will notify you by phone as soon as possible.  Submit refill requests through [employer] or call your pharmacy and they will forward the refill request to us. Please allow 3 business days for your refill to be completed.          Additional Information About Your Visit        MyChart Information     [employer] lets you send messages to your doctor, view your test results, renew your prescriptions, schedule appointments and more. To sign up, go to [email or web address] . Click on \"Log in\" " "on the left side of the screen, which will take you to the Welcome page. Then click on \"Sign up Now\" on the right side of the page.     You will be asked to enter the access code listed below, as well as some personal information. Please follow the directions to create your username and password.     Your access code is: [email or web address]  Expires: 2017  3:04 PM     Your access code will  in 90 days. If you need help or a new code, please call your [facility] or [phone].        Care EveryWhere ID     This is your Care EveryWhere ID. This could be used by other organizations to access your [facility] medical records  [id number]        Your Vitals Were     Pulse Temperature Respirations Pulse Oximetry BMI (Body Mass Index)       80 99.4  F (37.4  C) 16 95% 24.14 kg/m2        Blood Pressure from Last 3 Encounters:   17 150/70   17 133/79   17 142/61    Weight from Last 3 Encounters:   17 67.8 kg (149 lb 8 oz)   17 67.1 kg (147 lb 14.4 oz)   17 67 kg (147 lb 11.3 oz)                 Today's Medication Changes          These changes are accurate as of: 17 11:59 PM.  If you have any questions, ask your nurse or doctor.               Stop taking these medicines if you haven't already. Please contact your care team if you have questions.     allopurinol 100 MG tablet   Commonly known as:  ZYLOPRIM   Stopped by:  Eddie Modi MD                    Primary Care Provider Office Phone # Fax #    [facility] [phone]       [address] [facility]        Equal Access to Services     TRAE OMALLEY : Edilberto Lee, issac muller, isabel martinez. So [facility] [phone].    ATENCIÓN: Si habla español, tiene a frost disposición servicios gratuitos de asistencia lingüística. Llame al [phone].    We comply with applicable federal civil rights laws and [place] " laws. We do not discriminate on the basis of race, color, national origin, age, disability sex, sexual orientation or gender identity.            Thank you!     Thank you for choosing [facility]  for your care. Our goal is always to provide you with excellent care. Hearing back from our patients is one way we can continue to improve our services. Please take a few minutes to complete the written survey that you may receive in the mail after your visit with us. Thank you!             Your Updated Medication List - Protect others around you: Learn how to safely use, store and throw away your medicines at www.disposemymeds.org.          This list is accurate as of: 6/22/17 11:59 PM.  Always use your most recent med list.                   Brand Name Dispense Instructions for use Diagnosis    aspirin 81 MG tablet      Take 1 tablet by mouth daily.        DAILY MULTIVITAMIN PO      Take  by mouth daily.        fluocinonide 0.05 % cream    LIDEX    120 g    Apply topically 2 times daily Apply topically twice daily as needed for itchy rash on body.    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 Mix with vanicream    Cutaneous lymphoma (H)       ibuprofen 600 MG tablet    ADVIL/MOTRIN     Take 600 mg by mouth        LORazepam 0.5 MG tablet    ATIVAN    30 tablet    Take 1 tablet (0.5 mg) by mouth every 4 hours as needed (Anxiety, Nausea/Vomiting or Sleep)    Extranodal lymphoma (H)       losartan 25 MG tablet    COZAAR    30 tablet    Take 1 tablet by mouth daily.    Unspecified essential hypertension       magnesium 250 MG tablet      Take 1 tablet by mouth daily.        prochlorperazine 5 MG tablet    COMPAZINE    30 tablet    Take 1 tablet (5 mg) by mouth every 6 hours as needed (Nausea/Vomiting)    Extranodal lymphoma (H)       PROSCAR 5 MG tablet   Generic drug:  finasteride      Take 5 mg by  mouth daily.        TYLENOL PO      Take 325 mg by mouth daily as needed        Vitamin C 500 MG Caps      Take 250 mg by mouth daily.        vitamin D 1000 UNITS capsule      Take 1 capsule by mouth daily.

## 2017-06-22 NOTE — NURSING NOTE
"Oncology Rooming Note    June 22, 2017 1:29 PM   Bobby Bethea is a 87 year old male who presents for:    Chief Complaint   Patient presents with     Blood Draw     peripheral IV placed, labs collected and vitals taken      Oncology Clinic Visit     Lymphoma , Labs      Initial Vitals: /70  Pulse 80  Temp 99.4  F (37.4  C)  Resp 16  Wt 67.8 kg (149 lb 8 oz)  SpO2 95%  BMI 24.14 kg/m2 Estimated body mass index is 24.14 kg/(m^2) as calculated from the following:    Height as of 6/1/17: 1.676 m (5' 5.98\").    Weight as of this encounter: 67.8 kg (149 lb 8 oz). Body surface area is 1.78 meters squared.  No Pain (0) Comment: Data Unavailable   No LMP for male patient.  Allergies reviewed: Yes  Medications reviewed: Yes    Medications: Medication refills not needed today.  Pharmacy name entered into [facility]: [id number] [address]    Clinical concerns: labs , pt stated he is Depressed , no energy , per patient  Rian  was notified.    6 minutes for nursing intake (face to face time)     Tahira Hewitt MA              "

## 2017-06-22 NOTE — Clinical Note
Please schedule infusion appt for brentuximab for June 29th with CBCwith diff. Please  Give appt for SALLY for 5 weeks with labs and treatment.

## 2017-06-22 NOTE — NURSING NOTE
Chief Complaint   Patient presents with     Blood Draw     peripheral IV placed, labs collected and vitals taken      Sydnie Almaguer

## 2017-06-22 NOTE — Clinical Note
6/22/2017       RE: Bobby Bethea  [address] [address]     Dear Colleague,    Thank you for referring your patient, Bobby Bethea, to the [facility]. Please see a copy of my visit note below.      DATE OF VISIT :4/27/17  LAST VISIT:3/30/2017     HISTORY OF PRESENT ILLNESS :  Mr. 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  He has had 3 treatments for the extra cutaneous CD30-positive disease, including -- 12/1998 (CHOP - good response), 2012 (relapse treated with CVP - mixed response), and then 8 cycles of brentuximab vedotin completed on 05/21/2013 . He had a good clinical response including in the nodes and skin with Bretuximab therapy .  Until recently, he has had no evidence of recurrent dorothea disease since the brentuximab. However, in early 03/2017 he began to have fevers and sweats, and a 3-4 cm node in the left inguinal region and a cutaneous nodule above his left knee. An abdominal CT showed new enlarged lymph nodes in the left iliac and inguinofemoral regions. He was seen again on 03/30/2017 to review the results and obtain a FNA of a left groin node, which was positive for CD30+ lymphoma.    INTERVAL HISTORY : After the visit and biopsy on 03/30, Mr. Bethea was informed by telephone on 2 occasions of the biopsy and CT findings, and recommendation to start therapy. He thought the symptoms were improving and wanted to wait. However, the symptoms persisted and he now comes in with his wife for a discussion. Mr. Bethea has continued to be feeling down and very anxious. He still has fatigue, fevers, chills and sweats which are stable. He has been taking tylenol which brings his temperature down. Appetite is reduced, but not nauseated. Bowels are okay. He is now aware of results of the CT imaging, which were discussed with him in advance of  this clinic visit. However, he does not seem to remember all of the conversations.    No abdominopelvic pain, further change in nodes or LE edema. No rashes. Denies productive cough, dysuria, diarrhea, new pains.     REVIEW OF SYSTEMS:  A 12-point review of systems is otherwise generally negative.      ALLERGIES:  Atenolol/bee venom.       MEDICATIONS:   1.  Hydrocortisone cream (uses occasionally).   2.  Losartan.   3.  Acetaminophen p.r.n.   4.  Magnesium.   5.  Vitamin D.   6.  Aspirin 81 mg.   7.  Multivitamins.   8.  Finasteride.   9.  Vitamin C.      PHYSICAL EXAMINATION:   VITAL SIGNS:  There were no vitals taken for this visit.  HEENT:  Anicteric. Oral mucosae - moist, no plaque or ulceration.   NECK:   No nodes   CHEST:  Clear.  AXILLAE:  No nodes.   HEART:  Regular rhythm.  No murmur.   ABDOMEN:  Non tender . Bowels sounds present . No detectable organomegaly or mass.  No tenderness. No right inguinal nodes. Few small 1-2 cm left inguinal nodes and a 4-5 cm left femoral node.  EXTREMITIES:  No LE edema.   SKIN:  No  New lesions.  NEUROLOGY : Significant anxiety     LABORATORY DATA :      Hb 12.9, platelets 289,000, WBC 20,900 (90% neutrophils, 4% lymphocytes)  Electrolyte, calcium and creatinine (0.87) - normal.  Uric acid: 3.9  Liver enzymes, including LDH - normal.    From 03/30/2017:    Blood cultures: negative  Beta-2 microglobulin 2.9  , ESR 43  TSH 0.76  EBV DNA - detected below the reportable range of 500 copies.    3/30/2017:    Lymph node, left femoral, fine needle aspiration:  Atypical cells present, suspicious for recurrent/persistent involvement by lymphoma     Flow Cytometry:    1. There are very rare (48 events, 0.06%) CD30 positive events that have an immunophenotype similar to that described for the patient's T-cell lymphoma.  These findings are suspicious but not definitive for involvement by lymphoma as the number of events is near the limit of detection.     2. Small T-cell  populations (2.5% and 1.1%) have only subtle immunophenotypic shifts and are favored to be reactive, one has an immunophenotype consistent with T-cell large granular lymphocytes.  Upon review of the prior flow study ([id number]), these cell populations were also present at a low percentage.       CT imaging:   Abdomen and pelvis (03/22/2017):     1. New/significantly enlarged left iliac chain lymph nodes suggestive of recurrence of disease.  2. Mildly increased size of pancreatic body cystic lesion which measured 8 mm on 11/19/2015 and measures 10 mm in the current exam. This likely represents a simple cyst or a sidebranch Intraductal papillary mucinous neoplasm.  3. Other incidental findings including enlarged prostate, colonic diverticulosis, liver hemangioma and degenerative changes in the lumbar spine.  4. Small 1 cm soft tissue attenuation lesion in the left inguinal canal is stable since the prior exam and of unknown clinical significance.  5. Multiple basilar pulmonary nodules are new compared to the prior exam. Chest not fully evaluated on this exam.    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  The CT scan provides enough information to target a left inguinofemoral node by FNA. Biopsy confirms recurrence of lymphoma.     We spent approximately 45 minutes with Mr. Bethea and his wife, explaining the findings and their relationship to his symptoms. Also, reminding him of similar circumstances int he past, which responded to brentuximab. In this setting and his very good prior response, we recommended starting another course of therapy. He was very anxious throughout the visit and debated if treatment is worthwhile given his age. We discussed about possible outcomes without therapy including worsening of symptoms and feeling unwell and physical deterioration.  His wife was very supportive of trying treatment, but acknowledged that the decision would be his. We suggested that he consider his options over the weekend and discuss with family. We will be in touch next week to learn his decision, trying to make it clear that  would recommend and that he could start treatment and see how he tolerates it. We discussed his depression and anxiety, which he acknowledges but does not want to pursue counseling or intervention.    Brentuximab is administered intravenously over approximately 30 minutes and given every 3-4 weeks. Since reactions to the antibody-component are possible, premedication is routinely employed. The major toxicities are a moderate risk of myelosuppression, very mild nausea, slight risk of infection and diarrhea. The most common side effect is that of peripheral neuropathy, similar in nature to that seen with vinca alkaloids.       If the patient chooses to proceed, he will have antiemetics, allopurinol for 2 weeks (100 mg/day) and weekly blood counts during this first cycle of treatment. In his case we will administer the second cycle at 4 weeks.    Patient seen and plan discussed with Dr Modi.     SADIA Brock, MS.  Hematology/Oncology Fellow  [facility]  Pager [phone]    Oncology Attending    Patient seen and examined with the Oncology Fellow, Dr. Hdz. Agree with her findings, assessment and plan. After the clinic visit, Mr. Bethea communicated that he would like to proceed with treatment. He is scheduled to begin on Friday, 05/05/2017 with cycle #1.    Eddie Modi MD  Professor of Medicine  Oncology  [facility]  Office: [phone]  Clinic Fax: [phone]        CC:    MD Grey Sanchez MD        Again, thank you for allowing me to participate in the care of your patient.      Sincerely,    Edide Modi MD

## 2017-06-22 NOTE — LETTER
6/22/2017      RE: Bobby Bethea  [address] [address]       DATE OF VISIT :6/22/2017    HISTORY OF PRESENT ILLNESS :  Mr. 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  He has had 3 treatments for the extra cutaneous CD30-positive disease, including -- 12/1998 (CHOP - good response), 2012 (relapse treated with CVP - mixed response), and then 8 cycles of brentuximab vedotin completed on 05/21/2013 . He had a good clinical response including in the nodes and skin with Bretuximab therapy .Until recently, he has had no evidence of recurrent dorothea disease since the brentuximab. However, in early 03/2017 he began to have fevers and sweats, and a 3-4 cm node in the left inguinal region and a cutaneous nodule above his left knee. An abdominal CT showed new enlarged lymph nodes in the left iliac and inguinofemoral regions. He was seen again on 03/30/2017 to review the results and obtain a FNA of a left groin node, which was positive for CD30+ lymphoma.He started Brentuximab on 5/5/2017. Cycle 1 was associated with rash. Cycle #2 was done on 6/1/2017 at which time he had EKG for shortness of breath. EKG revealed PVC's. He follows up today.    INTERVAL HISTORY : His rash post cycle#1 brentuximab has now resolved. He feels his mass in left groin region has decreased in size. Has fatigue, continues to have issues with depression, but feels he is able to manage it himself. Has shortness of breath, but it has been stable.Denies any falls. He has been eating ok. He feels his groin lymph node has decreased in size.No abdominal pain, nausea, vomiting, diarrhoea. No fevers, chills, night sweats like before. No bleeding or bruising.Denies any cough, phlegmn, chest pain.He has been losing some hair recently from therapy.    REVIEW OF SYSTEMS:  A 12-point review of systems is otherwise generally  negative.      ALLERGIES:  Atenolol/bee venom.       MEDICATIONS:   1.  Hydrocortisone cream (uses occasionally).   2.  Losartan.   3.  Acetaminophen p.r.n.   4.  Magnesium.   5.  Vitamin D.   6.  Aspirin 81 mg.   7.  Multivitamins.   8.  Finasteride.   9.  Vitamin C.      PHYSICAL EXAMINATION:   VITAL SIGNS:  /70  Pulse 80  Temp 99.4  F (37.4  C)  Resp 16  Wt 67.8 kg (149 lb 8 oz)  SpO2 95%  BMI 24.14 kg/m2   GEN: not in distress.  HEENT:  Atraumatic, normocephalic, mucous membranes moist, conjunctiva normal.   LYMPH:   No cervical, axillary adenopathy   CHEST:  Bilateral air entry, no wheeze, no crackles.  HEART:  Regular rate rhythm.  No murmur.   ABDOMEN:  Soft non tender, not distended, no hepatosplenomegaly.  No right inguinal nodes. Few small 1 cm left inguinal nodes and a softer 4 cm left femoral node.  EXTREMITIES:  No pedal edema.   SKIN:  No rash or bruise  NEUROLOGY : moves all extremities, strength equal.     LABORATORY DATA :      Hb 13.0, platelets 219,000, WBC 7300 (79% neutrophils, 11% lymphocytes)  Electrolyte,  creatinine (0.90) - normal.Calcium 8.4    3/30/2017: Lymph node, left femoral, fine needle aspiration:  Atypical cells present, suspicious for recurrent/persistent involvement by lymphoma     ASSESSMENT AND PLAN:  H/O anaplastic large cell lymphoma ( ALK-negative , CD30-positive) - recurrence based on symptoms, new CT finding confirming clinical exam, and evidence from FNA.     He was initiated on brentuximab on 5/5/2017. Cycle#1 was associated with rash which has now resolved. At the time of second cycle(6/1/2017) he had complaints of shortness of breath which is stable now.Overall there has been improvement in symptoms and size of lymph node in groin with minimal side effects including hair loss. Plan for cycle #3 next week and follow up with mid level provider with labs in 5 weeks for next cycle.    Patient seen and plan discussed with Dr Modi.     Kathleen Hdz,  SADIA, MS.  Hematology/Oncology Fellow  [facility]  Pager [phone]    Oncology Attending    Patient seen and examined with the Oncology Fellow, Dr. Hdz. Agree with her findings, assessment and plan.    Eddie Modi MD  Professor of Medicine  Oncology  [facility]  Office: [phone]  Clinic Fax: [phone]      CC:    MD Grey Sanchez MD Bruce A. Peterson, MD

## 2017-06-22 NOTE — PROGRESS NOTES
DATE OF VISIT :6/22/2017      HISTORY OF PRESENT ILLNESS :  Mr. 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  He has had 3 treatments for the extra cutaneous CD30-positive disease, including -- 12/1998 ([facility] - good response), 2012 (relapse treated with CVP - mixed response), and then 8 cycles of brentuximab vedotin completed on 05/21/2013 . He had a good clinical response including in the nodes and skin with Bretuximab therapy .  Until recently, he has had no evidence of recurrent dorothea disease since the brentuximab. However, in early 03/2017 he began to have fevers and sweats, and a 3-4 cm node in the left inguinal region and a cutaneous nodule above his left knee. An abdominal CT showed new enlarged lymph nodes in the left iliac and inguinofemoral regions. He was seen again on 03/30/2017 to review the results and obtain a FNA of a left groin node, which was positive for CD30+ lymphoma.He started Brentuximab on 5/5/2017. Cycle 1 was associated with rash. Cycle #2 was done on 6/1/2017 at which time he had EKG for shortness of breath. EKG revealed PVC's. He follows up today.    INTERVAL HISTORY : His rash post cycle#1 brentuximab has now resolved. He feels his mass in left groin region has decreased in size. Has fatigue,    No abdominopelvic pain, further change in nodes or LE edema. No rashes. Denies productive cough, dysuria, diarrhea, new pains.     REVIEW OF SYSTEMS:  A 12-point review of systems is otherwise generally negative.      ALLERGIES:  Atenolol/bee venom.       MEDICATIONS:   1.  Hydrocortisone cream (uses occasionally).   2.  Losartan.   3.  Acetaminophen p.r.n.   4.  Magnesium.   5.  Vitamin D.   6.  Aspirin 81 mg.   7.  Multivitamins.   8.  Finasteride.   9.  Vitamin C.      PHYSICAL EXAMINATION:   VITAL SIGNS:  /70  Pulse 80  Temp 99.4  F (37.4  C)  Resp 16  Wt 67.8 kg (149 lb 8 oz)   SpO2 95%  BMI 24.14 kg/m2  HEENT:  Anicteric. Oral mucosae - moist, no plaque or ulceration.   NECK:   No nodes   CHEST:  Clear.  AXILLAE:  No nodes.   HEART:  Regular rhythm.  No murmur.   ABDOMEN:  Non tender . Bowels sounds present . No detectable organomegaly or mass.  No tenderness. No right inguinal nodes. Few small 1-2 cm left inguinal nodes and a 4-5 cm left femoral node.  EXTREMITIES:  No LE edema.   SKIN:  No  New lesions.  NEUROLOGY : Significant anxiety     LABORATORY DATA :      Hb 12.9, platelets 289,000, WBC 20,900 (90% neutrophils, 4% lymphocytes)  Electrolyte, calcium and creatinine (0.87) - normal.  Uric acid: 3.9  Liver enzymes, including LDH - normal.    From 03/30/2017:    Blood cultures: negative  Beta-2 microglobulin 2.9  , ESR 43  TSH 0.76  EBV DNA - detected below the reportable range of 500 copies.    3/30/2017:    Lymph node, left femoral, fine needle aspiration:  Atypical cells present, suspicious for recurrent/persistent involvement by lymphoma     Flow Cytometry:    1. There are very rare (48 events, 0.06%) CD30 positive events that have an immunophenotype similar to that described for the patient's T-cell lymphoma.  These findings are suspicious but not definitive for involvement by lymphoma as the number of events is near the limit of detection.     2. Small T-cell populations (2.5% and 1.1%) have only subtle immunophenotypic shifts and are favored to be reactive, one has an immunophenotype consistent with T-cell large granular lymphocytes.  Upon review of the prior flow study ([id number]), these cell populations were also present at a low percentage.       CT imaging:   Abdomen and pelvis (03/22/2017):     1. New/significantly enlarged left iliac chain lymph nodes suggestive of recurrence of disease.  2. Mildly increased size of pancreatic body cystic lesion which measured 8 mm on 11/19/2015 and measures 10 mm in the current exam. This likely represents a simple cyst or a  sidebranch Intraductal papillary mucinous neoplasm.  3. Other incidental findings including enlarged prostate, colonic diverticulosis, liver hemangioma and degenerative changes in the lumbar spine.  4. Small 1 cm soft tissue attenuation lesion in the left inguinal canal is stable since the prior exam and of unknown clinical significance.  5. Multiple basilar pulmonary nodules are new compared to the prior exam. Chest not fully evaluated on this exam.    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  The CT scan provides enough information to target a left inguinofemoral node by FNA. Biopsy confirms recurrence of lymphoma.     We spent approximately 45 minutes with Mr. Bethea and his wife, explaining the findings and their relationship to his symptoms. Also, reminding him of similar circumstances int he past, which responded to brentuximab. In this setting and his very good prior response, we recommended starting another course of therapy. He was very anxious throughout the visit and debated if treatment is worthwhile given his age. We discussed about possible outcomes without therapy including worsening of symptoms and feeling unwell and physical deterioration. His wife was very supportive of trying treatment, but acknowledged that the decision would be his. We suggested that he consider his options over the weekend and discuss with family. We will be in touch next week to learn his decision, trying to make it clear that  would recommend and that he could start treatment and see how he tolerates it. We discussed his depression and anxiety, which he acknowledges but does not want to pursue counseling or intervention.    Brentuximab is administered intravenously over approximately 30 minutes and given every 3-4 weeks. Since reactions to the antibody-component are  possible, premedication is routinely employed. The major toxicities are a moderate risk of myelosuppression, very mild nausea, slight risk of infection and diarrhea. The most common side effect is that of peripheral neuropathy, similar in nature to that seen with vinca alkaloids.       If the patient chooses to proceed, he will have antiemetics, allopurinol for 2 weeks (100 mg/day) and weekly blood counts during this first cycle of treatment. In his case we will administer the second cycle at 4 weeks.    Patient seen and plan discussed with Dr Modi.     SADIA Brock, MS.  Hematology/Oncology Fellow  [facility]  Pager [phone]    Oncology Attending    Patient seen and examined with the Oncology Fellow, Dr. Hdz. Agree with her findings, assessment and plan. After the clinic visit, Mr. Bethea communicated that he would like to proceed with treatment. He is scheduled to begin on Friday, 05/05/2017 with cycle #1.    Eddie Modi MD  Professor of Medicine  Oncology  [facility]  Office: [phone]  Clinic Fax: [phone]        CC:    MD Grey Sanchez MD

## 2017-06-28 NOTE — MR AVS SNAPSHOT
After Visit Summary   6/28/2017    Bobby Bethea    MRN: [id number]           Patient Information     Date Of Birth          7/2/1929        Visit Information        Provider Department      6/28/2017 2:30 PM  29 ATC; [facility] ONCOLOGY INFUSION [facility]        Today's Diagnoses     Extranodal lymphoma (H)    -  1      Care Instructions    Contact Numbers    [facility] Main Line: [phone]  [facility] Triage:  [phone]    Call triage with chills and/or temperature greater than or equal to 100.5, uncontrolled nausea/vomiting, diarrhea, constipation, dizziness, shortness of breath, chest pain, bleeding, unexplained bruising, or any new/concerning symptoms, questions/concerns.     If you are having any concerning symptoms or wish to speak to a provider before your next infusion visit, please call your care coordinator or triage to notify them so we can adequately serve you.       After Hours: [phone]    If after hours, weekends, or holidays, call main hospital  and ask for Oncology doctor on call.           June 2017 Sunday Monday Tuesday Wednesday Thursday Friday Saturday                       1     [facility] LAB DRAW    1:15 PM   (15 min.)    [facility] DRAW   [facility] Lab Draw     P RETURN    1:25 PM   (50 min.)   Ekaterina Bailey PA-C   [facility] ONC INFUSION 60    2:30 PM   (60 min.)   [facility] ONCOLOGY INFUSION   [facility] 2     3       4     5     6     7     8     9     10       11     12     13     14     15     16     17       18     19     20     21     22     P [facility] DRAW    1:00 PM   (15 min.)   [facility] DRAW   [facility] Lab Draw     [facility] RETURN    1:15 PM   (30 min.)   Eddie Modi MD   [facility] 23     24       25     26     27     28     P [facility] DRAW    2:00 PM   (15 min.)    [facility] DRAW   [facility] Lab Draw     [facility] ONC INFUSION  60    2:30 PM   (60 min.)   [facility] ONCOLOGY INFUSION   [facility] 29 30 July 2017 Sunday Monday Tuesday Wednesday Thursday Friday Saturday                                 1       2  Happy Birthday!     3     4     5     6     7     8       9     10     11     12     13     14     15       16     17     18     19     20     21     22       23     24     25     26     27     [facility] LAB DRAW    1:15 PM   (15 min.)   [facility] LAB DRAW   [facility] Lab Draw     [facility] RETURN    1:35 PM   (50 min.)   Racheal Ni APRN CNP   [facility]     [facility] ONC INFUSION 60    2:30 PM   (60 min.)    ONCOLOGY INFUSION   [facility] 28     29       30     31                                          Recent Results (from the past 24 hour(s))   *CBC with platelets differential    Collection Time: 06/28/17  2:08 PM   Result Value Ref Range    WBC 8.9 4.0 - 11.0 10e9/L    RBC Count 4.58 4.4 - 5.9 10e12/L    Hemoglobin 13.4 13.3 - 17.7 g/dL    Hematocrit 40.0 40.0 - 53.0 %    MCV 87 78 - 100 fl    MCH 29.3 26.5 - 33.0 pg    MCHC 33.5 31.5 - 36.5 g/dL    RDW 18.4 (H) 10.0 - 15.0 %    Platelet Count 214 150 - 450 10e9/L    Diff Method Automated Method     % Neutrophils 81.8 %    % Lymphocytes 9.6 %    % Monocytes 7.8 %    % Eosinophils 0.3 %    % Basophils 0.3 %    % Immature Granulocytes 0.2 %    Nucleated RBCs 0 0 /100    Absolute Neutrophil 7.3 1.6 - 8.3 10e9/L    Absolute Lymphocytes 0.9 0.8 - 5.3 10e9/L    Absolute Monocytes 0.7 0.0 - 1.3 10e9/L    Absolute Eosinophils 0.0 0.0 - 0.7 10e9/L    Absolute Basophils 0.0 0.0 - 0.2 10e9/L    Abs Immature Granulocytes 0.0 0 - 0.4 10e9/L    Absolute Nucleated RBC 0.0    Basic metabolic panel    Collection Time: 06/28/17  2:08 PM   Result Value Ref Range    Sodium 140 133 - 144 mmol/L    Potassium 3.8 3.4 - 5.3 mmol/L    Chloride 108 94 - 109 mmol/L    Carbon Dioxide 26 20 - 32 mmol/L    Anion Gap 7 3 - 14  mmol/L    Glucose 138 (H) 70 - 99 mg/dL    Urea Nitrogen 17 7 - 30 mg/dL    Creatinine 0.89 0.66 - 1.25 mg/dL    GFR Estimate 81 >60 mL/min/1.7m2    GFR Estimate If Black >90   GFR Calc   >60 mL/min/1.7m2    Calcium 8.5 8.5 - 10.1 mg/dL                 Follow-ups after your visit        Your next 10 appointments already scheduled     Jul 27, 2017  1:15 PM CDT   [facility] Draw with [facility] LAB DRAW   [facility] Lab Draw ([facility])    [address] [place] [id number] [phone]            Jul 27, 2017  1:50 PM CDT   (Arrive by 1:35 PM)   Return Visit with ROBEL Keita CNP   [facility] ([facility])    [address] [place] [id number] [phone]            Jul 27, 2017  2:30 PM CDT   Infusion 60 with  ONCOLOGY INFUSION, [facility] 29 ATC   [facility] ([facility])    [address] [place] [id number] [phone]              Who to contact     If you have questions or need follow up information about today's clinic visit or your schedule please contact [facility] CANCER [facility] directly at [phone].  Normal or non-critical lab and imaging results will be communicated to you by [employer], letter or phone within 4 business days after the clinic has received the results. If you do not hear from us within 7 days, please contact the clinic through [employer] or phone. If you have a critical or abnormal lab result, we will notify you by phone as soon as possible.  Submit refill requests through [employer] or call your pharmacy and they will forward the refill request to us. Please allow 3 business days for your refill to be completed.          Additional Information About Your Visit        MyChart Information     [employer] lets you send messages to your doctor, view your test results, renew your  "prescriptions, schedule appointments and more. To sign up, go to [email or web address] . Click on \"Log in\" on the left side of the screen, which will take you to the Welcome page. Then click on \"Sign up Now\" on the right side of the page.     You will be asked to enter the access code listed below, as well as some personal information. Please follow the directions to create your username and password.     Your access code is: [email or web address]  Expires: 2017  3:04 PM     Your access code will  in 90 days. If you need help or a new code, please call your [place] clinic or [phone].        Care EveryWhere ID     This is your Care EveryWhere ID. This could be used by other organizations to access your [place] medical records  [id number]        Your Vitals Were     Pulse Temperature Respirations Pulse Oximetry BMI (Body Mass Index)       74 98.7  F (37.1  C) (Oral) 18 98% 23.98 kg/m2        Blood Pressure from Last 3 Encounters:   17 147/70   17 150/70   17 133/79    Weight from Last 3 Encounters:   17 67.4 kg (148 lb 8 oz)   17 67.8 kg (149 lb 8 oz)   17 67.1 kg (147 lb 14.4 oz)              We Performed the Following     *CBC with platelets differential     Basic metabolic panel        Primary Care Provider Office Phone # Fax #    [facility] [phone]       [address] [place] [address]        Equal Access to Services     TRAE OMALLEY : Hadii aad ku hadasho Sosamreenali, waaxda luqadaha, qaybta kaalmada adeegmegha, isabel abdi . So [facility] [phone].    ATENCIÓN: Si habla español, tiene a frost disposición servicios gratuitos de asistencia lingüística. Llame al [phone].    We comply with applicable federal civil rights laws and [place] laws. We do not discriminate on the basis of race, color, national origin, age, disability sex, sexual orientation or gender identity.            Thank you!     Thank " you for choosing [facility]  for your care. Our goal is always to provide you with excellent care. Hearing back from our patients is one way we can continue to improve our services. Please take a few minutes to complete the written survey that you may receive in the mail after your visit with us. Thank you!             Your Updated Medication List - Protect others around you: Learn how to safely use, store and throw away your medicines at www.disposemymeds.org.          This list is accurate as of: 6/28/17  3:22 PM.  Always use your most recent med list.                   Brand Name Dispense Instructions for use Diagnosis    aspirin 81 MG tablet      Take 1 tablet by mouth daily.        DAILY MULTIVITAMIN PO      Take  by mouth daily.        fluocinonide 0.05 % cream    LIDEX    120 g    Apply topically 2 times daily Apply topically twice daily as needed for itchy rash on body.    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 Mix with vanicream    Cutaneous lymphoma (H)       ibuprofen 600 MG tablet    ADVIL/MOTRIN     Take 600 mg by mouth        LORazepam 0.5 MG tablet    ATIVAN    30 tablet    Take 1 tablet (0.5 mg) by mouth every 4 hours as needed (Anxiety, Nausea/Vomiting or Sleep)    Extranodal lymphoma (H)       losartan 25 MG tablet    COZAAR    30 tablet    Take 1 tablet by mouth daily.    Unspecified essential hypertension       magnesium 250 MG tablet      Take 1 tablet by mouth daily.        prochlorperazine 5 MG tablet    COMPAZINE    30 tablet    Take 1 tablet (5 mg) by mouth every 6 hours as needed (Nausea/Vomiting)    Extranodal lymphoma (H)       PROSCAR 5 MG tablet   Generic drug:  finasteride      Take 5 mg by mouth daily.        TYLENOL PO      Take 325 mg by mouth daily as needed        Vitamin C 500 MG Caps      Take 250 mg by mouth daily.        vitamin D 1000 UNITS  capsule      Take 1 capsule by mouth daily.

## 2017-06-28 NOTE — PATIENT INSTRUCTIONS
Contact Numbers    [facility] [place]: [phone]  [facility] Triage:  [phone]    Call triage with chills and/or temperature greater than or equal to 100.5, uncontrolled nausea/vomiting, diarrhea, constipation, dizziness, shortness of breath, chest pain, bleeding, unexplained bruising, or any new/concerning symptoms, questions/concerns.     If you are having any concerning symptoms or wish to speak to a provider before your next infusion visit, please call your care coordinator or triage to notify them so we can adequately serve you.       After Hours: [phone]    If after hours, weekends, or holidays, call main hospital  and ask for Oncology doctor on call.           June 2017 Sunday Monday Tuesday Wednesday Thursday Friday Saturday                       1     [facility] D[facility]    1:15 PM   (15 min.)   [facility] DRAW   [facility] Lab Draw     [facility] RETURN    1:25 PM   (50 min.)   Ekaterina Bailey PA-C   [facility]     [facility] ONC INFUSION 60    2:30 PM   (60 min.)   [facility] ONCOLOGY INFUSION   [facility] 2     3       4     5     6     7     8     9     10       11     12     13     14     15     16     17       18     19     20     21     22     [facility] DRAW    1:00 PM   (15 min.)   [facility] DRAW   [facility] Lab Draw     [facility] RETURN    1:15 PM   (30 min.)   Eddie Modi MD   [facility] 23     24       25     26     27     28     [facility] DRAW    2:00 PM   (15 min.)    [facility] LAB DRAW   [facility] Lab Draw     [facility] ONC INFUSION 60    2:30 PM   (60 min.)   [facility] ONCOLOGY INFUSION   [facility] 29 30 July 2017 Sunday Monday Tuesday Wednesday Thursday Friday Saturday                                 1       2  Happy Birthday!     3     4     5     6     7     8       9     10     11     12     13     14     15       16     17     18     19     20     21      22       23     24     25     26     27     [facility] LAB DRAW    1:15 PM   (15 min.)   [facility] LAB DRAW   [facility] Lab Draw     [facility] RETURN    1:35 PM   (50 min.)   Racheal Ni APRN CNP   [facility] ONC INFUSION 60    2:30 PM   (60 min.)    ONCOLOGY INFUSION   [facility] 28     29       30     31                                          Recent Results (from the past 24 hour(s))   *CBC with platelets differential    Collection Time: 06/28/17  2:08 PM   Result Value Ref Range    WBC 8.9 4.0 - 11.0 10e9/L    RBC Count 4.58 4.4 - 5.9 10e12/L    Hemoglobin 13.4 13.3 - 17.7 g/dL    Hematocrit 40.0 40.0 - 53.0 %    MCV 87 78 - 100 fl    MCH 29.3 26.5 - 33.0 pg    MCHC 33.5 31.5 - 36.5 g/dL    RDW 18.4 (H) 10.0 - 15.0 %    Platelet Count 214 150 - 450 10e9/L    Diff Method Automated Method     % Neutrophils 81.8 %    % Lymphocytes 9.6 %    % Monocytes 7.8 %    % Eosinophils 0.3 %    % Basophils 0.3 %    % Immature Granulocytes 0.2 %    Nucleated RBCs 0 0 /100    Absolute Neutrophil 7.3 1.6 - 8.3 10e9/L    Absolute Lymphocytes 0.9 0.8 - 5.3 10e9/L    Absolute Monocytes 0.7 0.0 - 1.3 10e9/L    Absolute Eosinophils 0.0 0.0 - 0.7 10e9/L    Absolute Basophils 0.0 0.0 - 0.2 10e9/L    Abs Immature Granulocytes 0.0 0 - 0.4 10e9/L    Absolute Nucleated RBC 0.0    Basic metabolic panel    Collection Time: 06/28/17  2:08 PM   Result Value Ref Range    Sodium 140 133 - 144 mmol/L    Potassium 3.8 3.4 - 5.3 mmol/L    Chloride 108 94 - 109 mmol/L    Carbon Dioxide 26 20 - 32 mmol/L    Anion Gap 7 3 - 14 mmol/L    Glucose 138 (H) 70 - 99 mg/dL    Urea Nitrogen 17 7 - 30 mg/dL    Creatinine 0.89 0.66 - 1.25 mg/dL    GFR Estimate 81 >60 mL/min/1.7m2    GFR Estimate If Black >90   GFR Calc   >60 mL/min/1.7m2    Calcium 8.5 8.5 - 10.1 mg/dL

## 2017-06-28 NOTE — PROGRESS NOTES
Infusion Nursing Note:  Bobby Bethea presents today for Cycle 3 Day 1 Brentuximab.    Patient seen by provider today: No   present during visit today: Not Applicable.    Intravenous Access:  Peripheral IV placed.    Treatment Conditions:  Lab Results   Component Value Date    HGB 13.4 06/28/2017     Lab Results   Component Value Date    WBC 8.9 06/28/2017      Lab Results   Component Value Date    ANEU 7.3 06/28/2017     Lab Results   Component Value Date     06/28/2017      Lab Results   Component Value Date     06/28/2017                   Lab Results   Component Value Date    POTASSIUM 3.8 06/28/2017           Lab Results   Component Value Date    MAG 2.5 05/18/2017            Lab Results   Component Value Date    CR 0.89 06/28/2017                   Lab Results   Component Value Date    VINICIO 8.5 06/28/2017                Lab Results   Component Value Date    BILITOTAL 1.3 04/27/2017           Lab Results   Component Value Date    ALBUMIN 2.8 04/27/2017                    Lab Results   Component Value Date    ALT 16 04/27/2017           Lab Results   Component Value Date    AST 11 04/27/2017     Results reviewed, labs MET treatment parameters, ok to proceed with treatment.      Post Infusion Assessment:  Patient tolerated infusion without incident.  Blood return noted pre and post infusion.  Site patent and intact, free from redness, edema or discomfort.  No evidence of extravasations.  Access discontinued per protocol.    Discharge Plan:   Patient declined prescription refills.  Copy of AVS reviewed with patient and/or family.  Patient will return 7/27/17 for next appointment.  Patient discharged in stable condition accompanied by: self.  Departure Mode: Ambulatory.    Johana Mc RN

## 2017-07-27 NOTE — LETTER
"7/27/2017       RE: Bobby Bethea  [address] [facility] [address]     Dear Colleague,    Thank you for referring your patient, Bobby Bethea, to the [facility]. Please see a copy of my visit note below.    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  He has had 3 treatments for the extra cutaneous CD30-positive disease, including -- 12/1998 (CHOP - good response), 2012 (relapse treated with CVP - mixed response), and then 8 cycles of brentuximab vedotin completed on 05/21/2013 . He had a good clinical response including in the nodes and skin with Bretuximab therapy .  Until recently, he has had no evidence of recurrent dorothea disease since the brentuximab. However, in early 03/2017 he began to have fevers and sweats, and a 3-4 cm node in the left inguinal region and a cutaneous nodule above his left knee. An abdominal CT showed new enlarged lymph nodes in the left iliac and inguinofemoral regions. He was seen again on 03/30/2017 to review the results and obtain a FNA of a left groin node, which was positive for CD30+ lymphoma.  He is currently undergoing treatment with brentuximab which started on 5/5/2017. He developed a rash following cycle 1, possibly due to allopurinol versus brentuximab. Please see previous notes for further details on the patient's history. He comes in today for routine follow up prior to cycle 4.    Interval History:  Bobby is here alone for he appointment for follow up of his cutaneous T-cell lymphoma. He been feeling okay since his last infusion. He has not had any fevers or drenching night sweats. He continues to have a \"pretty good\" appetite and drinks about 32 oz of fluid per day. He feels he is still dealing with " "some fatigue and he doesn't sleep well at night. He denies any change in his intermittent shortness of breath. He deos not feel that the SOB is related to his activity level. He is planning on having a physical with his PCP soon for his yearly physical. He will discuss SOB and depression with is PCP. He admits to worrying a lot and some depression. He doesn't feel like the depression occurred because of the relapse of his lymphoma and it may have been there prior to recurrence. He said he doesn't use the treadmill anymore because \"he worries he will have a heart attack while walking\". He denies chest pain. He denies cough. He denies falls. He has had neuropathy on the bottom of his feet for > 5 years. He denies diarrhea or constipation. He denies nausea, vomiting or abdominal pain. He feels the inguinal nodes are much smaller and softer.      Review of Systems: Patient denies any of the following except if noted above: vision or hearing changes, abdominal pain, nausea, vomiting, diarrhea, constipation, urinary concerns, headaches, numbness, tingling or issues with mood.     Current Outpatient Prescriptions   Medication Sig Dispense Refill     LORazepam (ATIVAN) 0.5 MG tablet Take 1 tablet (0.5 mg) by mouth every 4 hours as needed (Anxiety, Nausea/Vomiting or Sleep) 30 tablet 2     prochlorperazine (COMPAZINE) 5 MG tablet Take 1 tablet (5 mg) by mouth every 6 hours as needed (Nausea/Vomiting) 30 tablet 1     ibuprofen (ADVIL/MOTRIN) 600 MG tablet Take 600 mg by mouth       fluocinonide (LIDEX) 0.05 % cream Apply topically 2 times daily Apply topically twice daily as needed for itchy rash on body. 120 g 5     hydrocortisone 2.5 % cream Apply  topically 2 times daily. Mix with vanicream 454 g 3     losartan (COZAAR) 25 MG tablet Take 1 tablet by mouth daily. 30 tablet 3     Acetaminophen (TYLENOL PO) Take 325 mg by mouth daily as needed        magnesium 250 MG tablet Take 1 tablet by mouth daily.       Cholecalciferol " "(VITAMIN D) 1000 UNITS capsule Take 1 capsule by mouth daily.       aspirin 81 MG tablet Take 1 tablet by mouth daily.       Multiple Vitamin (DAILY MULTIVITAMIN PO) Take  by mouth daily.       finasteride (PROSCAR) 5 MG tablet Take 5 mg by mouth daily.       Ascorbic Acid (VITAMIN C) 500 MG CAPS Take 250 mg by mouth daily.       Physical Examination:  /59  Pulse 63  Temp 98.6  F (37  C) (Oral)  Resp 12  Ht 1.676 m (5' 5.98\")  Wt 67.6 kg (149 lb)  SpO2 97%  BMI 24.06 kg/m2  Wt Readings from Last 10 Encounters:   07/27/17 67.6 kg (149 lb)   06/28/17 67.4 kg (148 lb 8 oz)   06/22/17 67.8 kg (149 lb 8 oz)   06/01/17 67.1 kg (147 lb 14.4 oz)   05/18/17 67 kg (147 lb 11.3 oz)   05/05/17 69.9 kg (154 lb)   04/27/17 68.4 kg (150 lb 12.7 oz)   03/30/17 70.3 kg (154 lb 15.7 oz)   03/16/17 70.5 kg (155 lb 6.8 oz)   11/09/16 70 kg (154 lb 5.2 oz)   General: alert, cooperative, no apparent distress  Skin: skin warm and dry, no rash, petechiae or ecchymoses  HEENT: normocephalic, atraumatic, PERRL, oral mucosa pink, pharynx without exudate.   Neck: neck supple   Respiratory: thorax is symmetric with good expansion, lungs clear to ausculation bilaterally, no wheezes, crackles or rhonchi  Cardiovascular: RRR, no murmurs or extra sounds  Gastrointestinal: active bowel sounds, soft, non-tender; no palpable masses or hepatosplenomegally  Peripheral Vascular: without edema.   Neurologic: CN II-XII grossly intact  Lymph: No cervical, supraclavicular, axillary adenopathy. Inguinal nodes on the left 4 cm x 1 cm and on the right 1 cm.     Laboratory Data:     Ref. Range 7/27/2017 13:23   Sodium Latest Ref Range: 133 - 144 mmol/L 140   Potassium Latest Ref Range: 3.4 - 5.3 mmol/L 3.7   Chloride Latest Ref Range: 94 - 109 mmol/L 107   Carbon Dioxide Latest Ref Range: 20 - 32 mmol/L 24   Urea Nitrogen Latest Ref Range: 7 - 30 mg/dL 21   Creatinine Latest Ref Range: 0.66 - 1.25 mg/dL 1.01   GFR Estimate Latest Ref Range: >60 " mL/min/1.7m2 70   GFR Estimate If Black Latest Ref Range: >60 mL/min/1.7m2 84   Calcium Latest Ref Range: 8.5 - 10.1 mg/dL 8.5   Anion Gap Latest Ref Range: 3 - 14 mmol/L 8   Albumin Latest Ref Range: 3.4 - 5.0 g/dL 3.4   Protein Total Latest Ref Range: 6.8 - 8.8 g/dL 6.6 (L)   Bilirubin Total Latest Ref Range: 0.2 - 1.3 mg/dL 0.5   Alkaline Phosphatase Latest Ref Range: 40 - 150 U/L 68   ALT Latest Ref Range: 0 - 70 U/L 18   AST Latest Ref Range: 0 - 45 U/L 14   Lactate Dehydrogenase Latest Ref Range: 85 - 227 U/L 205   Glucose Latest Ref Range: 70 - 99 mg/dL 199 (H)   WBC Latest Ref Range: 4.0 - 11.0 10e9/L 7.8   Hemoglobin Latest Ref Range: 13.3 - 17.7 g/dL 13.4   Hematocrit Latest Ref Range: 40.0 - 53.0 % 39.6 (L)   Platelet Count Latest Ref Range: 150 - 450 10e9/L 202   RBC Count Latest Ref Range: 4.4 - 5.9 10e12/L 4.47   MCV Latest Ref Range: 78 - 100 fl 89   MCH Latest Ref Range: 26.5 - 33.0 pg 30.0   MCHC Latest Ref Range: 31.5 - 36.5 g/dL 33.8   RDW Latest Ref Range: 10.0 - 15.0 % 17.4 (H)   Diff Method Unknown Automated Method   % Neutrophils Latest Units: % 81.8   % Lymphocytes Latest Units: % 9.9   % Monocytes Latest Units: % 6.8   % Eosinophils Latest Units: % 0.6   % Basophils Latest Units: % 0.5   % Immature Granulocytes Latest Units: % 0.4   Nucleated RBCs Latest Ref Range: 0 /100 0   Absolute Neutrophil Latest Ref Range: 1.6 - 8.3 10e9/L 6.4   Absolute Lymphocytes Latest Ref Range: 0.8 - 5.3 10e9/L 0.8   Absolute Monocytes Latest Ref Range: 0.0 - 1.3 10e9/L 0.5   Absolute Eosinophils Latest Ref Range: 0.0 - 0.7 10e9/L 0.1   Absolute Basophils Latest Ref Range: 0.0 - 0.2 10e9/L 0.0   Abs Immature Granulocytes Latest Ref Range: 0 - 0.4 10e9/L 0.0   Absolute Nucleated RBC Unknown 0.0       Assessment and Plan:  Anaplastic large cell lymphoma. He started on brentuximab on 5/5/17. He tolerated the first cycle fairly well, but did develop a rash, which has since resolved and has noticed some hair loss.  His night sweats have resolved and the inguinal lymph nodes seems a bit smaller. He will follow up with Dr. Modi in 3 weeks with cycle 5. He will call sooner for concerns.     Dyspnea. History of smoking > 20 years ago. Emphysematous changes seen on last two CT scans. To evaluate further the potential need for inhalers versus other causes of his dyspnea, we recommended he schedule a f/u with his PCP which he is planning to do.         Avril HUSTON NP-C [facility]NY  [facility]  [address] [place] [id number] [phone]

## 2017-07-27 NOTE — MR AVS SNAPSHOT
After Visit Summary   7/27/2017    Bobby Bethea    MRN: [id number]           Patient Information     Date Of Birth          7/2/1929        Visit Information        Provider Department      7/27/2017 1:50 PM Racheal Ni APRN CNP [facility]        Today's Diagnoses     Extranodal lymphoma (H)    -  1       Follow-ups after your visit        Your next 10 appointments already scheduled     Aug 24, 2017  1:00 PM CDT   [facility] Draw with [facility] LAB DRAW   [facility] Lab Draw ([facility])    [address] [place] [id number] [phone]            Aug 24, 2017  1:30 PM CDT   (Arrive by 1:15 PM)   Return Visit with Eddie Modi MD   [facility] ([facility])    [address] [place] [id number] [phone]            Aug 24, 2017  2:00 PM CDT   Infusion 60 with  ONCOLOGY INFUSION, UC 11 ATC   [facility] ([facility])    [address] [place] [address] [phone]              Who to contact     If you have questions or need follow up information about today's clinic visit or your schedule please contact [facility] directly at [phone].  Normal or non-critical lab and imaging results will be communicated to you by MyChart, letter or phone within 4 business days after the clinic has received the results. If you do not hear from us within 7 days, please contact the clinic through MyChart or phone. If you have a critical or abnormal lab result, we will notify you by phone as soon as possible.  Submit refill requests through [employer] or call your pharmacy and they will forward the refill request to us. Please allow 3 business days for your refill to be completed.          Additional Information About Your Visit        MyChart Information     [employer]  "lets you send messages to your doctor, view your test results, renew your prescriptions, schedule appointments and more. To sign up, go to [email or web address] . Click on \"Log in\" on the left side of the screen, which will take you to the Welcome page. Then click on \"Sign up Now\" on the right side of the page.     You will be asked to enter the access code listed below, as well as some personal information. Please follow the directions to create your username and password.     Your access code is: [email or web address]  Expires: 2017  3:04 PM     Your access code will  in 90 days. If you need help or a new code, please call your [facility] clinic or [phone].        Care EveryWhere ID     This is your Care EveryWhere ID. This could be used by other organizations to access your [facility] medical records  [id number]        Your Vitals Were     Pulse Temperature Respirations Height Pulse Oximetry BMI (Body Mass Index)    63 98.6  F (37  C) (Oral) 12 1.676 m (5' 5.98\") 97% 24.06 kg/m2       Blood Pressure from Last 3 Encounters:   No data found for BP    Weight from Last 3 Encounters:   No data found for Wt              Today, you had the following     No orders found for display       Primary Care Provider Office Phone # Fax #    [facility] [phone]       [address] [facility]        Equal Access to Services     TRAE OMALLEY : Hadii bishnu ku hadasho Sosamreenali, waaxda luqadaha, qaybta kaalmada adeegyada, isabel cedeno. So [facility] [phone].    ATENCIÓN: Si habla español, tiene a frost disposición servicios gratuitos de asistencia lingüística. Chyna al [phone].    We comply with applicable federal civil rights laws and [place] laws. We do not discriminate on the basis of race, color, national origin, age, disability sex, sexual orientation or gender identity.            Thank you!     Thank you for choosing [facility] "  for your care. Our goal is always to provide you with excellent care. Hearing back from our patients is one way we can continue to improve our services. Please take a few minutes to complete the written survey that you may receive in the mail after your visit with us. Thank you!             Your Updated Medication List - Protect others around you: Learn how to safely use, store and throw away your medicines at www.disposemymeds.org.          This list is accurate as of: 7/27/17 11:59 PM.  Always use your most recent med list.                   Brand Name Dispense Instructions for use Diagnosis    aspirin 81 MG tablet      Take 1 tablet by mouth daily.        DAILY MULTIVITAMIN PO      Take  by mouth daily.        fluocinonide 0.05 % cream    LIDEX    120 g    Apply topically 2 times daily Apply topically twice daily as needed for itchy rash on body.    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 Mix with vanicream    Cutaneous lymphoma (H)       ibuprofen 600 MG tablet    ADVIL/MOTRIN     Take 600 mg by mouth        LORazepam 0.5 MG tablet    ATIVAN    30 tablet    Take 1 tablet (0.5 mg) by mouth every 4 hours as needed (Anxiety, Nausea/Vomiting or Sleep)    Extranodal lymphoma (H)       losartan 25 MG tablet    COZAAR    30 tablet    Take 1 tablet by mouth daily.    Unspecified essential hypertension       magnesium 250 MG tablet      Take 1 tablet by mouth daily.        prochlorperazine 5 MG tablet    COMPAZINE    30 tablet    Take 1 tablet (5 mg) by mouth every 6 hours as needed (Nausea/Vomiting)    Extranodal lymphoma (H)       PROSCAR 5 MG tablet   Generic drug:  finasteride      Take 5 mg by mouth daily.        TYLENOL PO      Take 325 mg by mouth daily as needed        Vitamin C 500 MG Caps      Take 250 mg by mouth daily.        vitamin D 1000 UNITS capsule      Take 1 capsule by mouth daily.

## 2017-07-27 NOTE — PATIENT INSTRUCTIONS
Contact Numbers    [facility] Main Line: [phone]  [facility] Triage:  [phone]    Call triage with chills and/or temperature greater than or equal to 100.5, uncontrolled nausea/vomiting, diarrhea, constipation, dizziness, shortness of breath, chest pain, bleeding, unexplained bruising, or any new/concerning symptoms, questions/concerns.     If you are having any concerning symptoms or wish to speak to a provider before your next infusion visit, please call your care coordinator or triage to notify them so we can adequately serve you.       After Hours: [phone]    If after hours, weekends, or holidays, call main hospital  and ask for Oncology doctor on call.         July 2017 Sunday Monday Tuesday Wednesday Thursday Friday Saturday                                 1       2  Happy Birthday!     3     4     5     6     7     8       9     10     11     12     13     14     15       16     17     18     19     20     21     22       23     24     25     26     27     [facility] DRAW    1:15 PM   (15 min.)    [facility] LAB DRAW   [facility] Lab Draw     [facility] RETURN    1:35 PM   (50 min.)   [facility]    2:30 PM   (60 min.)    ONCOLOGY INFUSION   [facility] 28     29       30     31                                         August 2017 Sunday Monday Tuesday Wednesday Thursday Friday Saturday             1     2     3     4     5       6     7     8     9     10     11     12       13     14     15     16     17     18     19       20     21     22     23     24     [facility] DRAW    1:00 PM   (15 min.)    [facility] DRAW   [facility] Lab Draw     [facility] RETURN    1:15 PM   (30 min.)   Eddie Modi MD   [facility]     [facility] ONC INFUSION 60    2:00 PM   (60 min.)    ONCOLOGY INFUSION   [facility] 25     26       27     28     29     30     31                             Lab Results:  Recent Results (from the past 12 hour(s))   CBC with platelets differential    Collection Time: 07/27/17  1:23 PM   Result Value Ref Range    WBC 7.8 4.0 - 11.0 10e9/L    RBC Count 4.47 4.4 - 5.9 10e12/L    Hemoglobin 13.4 13.3 - 17.7 g/dL    Hematocrit 39.6 (L) 40.0 - 53.0 %    MCV 89 78 - 100 fl    MCH 30.0 26.5 - 33.0 pg    MCHC 33.8 31.5 - 36.5 g/dL    RDW 17.4 (H) 10.0 - 15.0 %    Platelet Count 202 150 - 450 10e9/L    Diff Method Automated Method     % Neutrophils 81.8 %    % Lymphocytes 9.9 %    % Monocytes 6.8 %    % Eosinophils 0.6 %    % Basophils 0.5 %    % Immature Granulocytes 0.4 %    Nucleated RBCs 0 0 /100    Absolute Neutrophil 6.4 1.6 - 8.3 10e9/L    Absolute Lymphocytes 0.8 0.8 - 5.3 10e9/L    Absolute Monocytes 0.5 0.0 - 1.3 10e9/L    Absolute Eosinophils 0.1 0.0 - 0.7 10e9/L    Absolute Basophils 0.0 0.0 - 0.2 10e9/L    Abs Immature Granulocytes 0.0 0 - 0.4 10e9/L    Absolute Nucleated RBC 0.0    Comprehensive metabolic panel    Collection Time: 07/27/17  1:23 PM   Result Value Ref Range    Sodium 140 133 - 144 mmol/L    Potassium 3.7 3.4 - 5.3 mmol/L    Chloride 107 94 - 109 mmol/L    Carbon Dioxide 24 20 - 32 mmol/L    Anion Gap 8 3 - 14 mmol/L    Glucose 199 (H) 70 - 99 mg/dL    Urea Nitrogen 21 7 - 30 mg/dL    Creatinine 1.01 0.66 - 1.25 mg/dL    GFR Estimate 70 >60 mL/min/1.7m2    GFR Estimate If Black 84 >60 mL/min/1.7m2    Calcium 8.5 8.5 - 10.1 mg/dL    Bilirubin Total 0.5 0.2 - 1.3 mg/dL    Albumin 3.4 3.4 - 5.0 g/dL    Protein Total 6.6 (L) 6.8 - 8.8 g/dL    Alkaline Phosphatase 68 40 - 150 U/L    ALT 18 0 - 70 U/L    AST 14 0 - 45 U/L   Lactate Dehydrogenase    Collection Time: 07/27/17  1:23 PM   Result Value Ref Range    Lactate Dehydrogenase 205 85 - 227 U/L

## 2017-07-27 NOTE — PROGRESS NOTES
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  He has had 3 treatments for the extra cutaneous CD30-positive disease, including -- 12/1998 (CHOP - good response), 2012 (relapse treated with CVP - mixed response), and then 8 cycles of brentuximab vedotin completed on 05/21/2013 . He had a good clinical response including in the nodes and skin with Bretuximab therapy .  Until recently, he has had no evidence of recurrent dorothea disease since the brentuximab. However, in early 03/2017 he began to have fevers and sweats, and a 3-4 cm node in the left inguinal region and a cutaneous nodule above his left knee. An abdominal CT showed new enlarged lymph nodes in the left iliac and inguinofemoral regions. He was seen again on 03/30/2017 to review the results and obtain a FNA of a left groin node, which was positive for CD30+ lymphoma.  He is currently undergoing treatment with brentuximab which started on 5/5/2017. He developed a rash following cycle 1, possibly due to allopurinol versus brentuximab. Please see previous notes for further details on the patient's history. He comes in today for routine follow up prior to cycle 4.    Interval History:  Bobby is here alone for he appointment for follow up of his cutaneous T-cell lymphoma. He been feeling okay since his last infusion. He has not had any fevers or drenching night sweats. He continues to have a \"pretty good\" appetite and drinks about 32 oz of fluid per day. He feels he is still dealing with some fatigue and he doesn't sleep well at night. He denies any change in his intermittent shortness of breath. He deos not feel that the SOB is related to his activity level. He is planning on having a physical with his PCP soon for his yearly physical. " "He will discuss SOB and depression with is PCP. He admits to worrying a lot and some depression. He doesn't feel like the depression occurred because of the relapse of his lymphoma and it may have been there prior to recurrence. He said he doesn't use the treadmill anymore because \"he worries he will have a heart attack while walking\". He denies chest pain. He denies cough. He denies falls. He has had neuropathy on the bottom of his feet for > 5 years. He denies diarrhea or constipation. He denies nausea, vomiting or abdominal pain. He feels the inguinal nodes are much smaller and softer.      Review of Systems: Patient denies any of the following except if noted above: vision or hearing changes, abdominal pain, nausea, vomiting, diarrhea, constipation, urinary concerns, headaches, numbness, tingling or issues with mood.     Current Outpatient Prescriptions   Medication Sig Dispense Refill     LORazepam (ATIVAN) 0.5 MG tablet Take 1 tablet (0.5 mg) by mouth every 4 hours as needed (Anxiety, Nausea/Vomiting or Sleep) 30 tablet 2     prochlorperazine (COMPAZINE) 5 MG tablet Take 1 tablet (5 mg) by mouth every 6 hours as needed (Nausea/Vomiting) 30 tablet 1     ibuprofen (ADVIL/MOTRIN) 600 MG tablet Take 600 mg by mouth       fluocinonide (LIDEX) 0.05 % cream Apply topically 2 times daily Apply topically twice daily as needed for itchy rash on body. 120 g 5     hydrocortisone 2.5 % cream Apply  topically 2 times daily. Mix with vanicream 454 g 3     losartan (COZAAR) 25 MG tablet Take 1 tablet by mouth daily. 30 tablet 3     Acetaminophen (TYLENOL PO) Take 325 mg by mouth daily as needed        magnesium 250 MG tablet Take 1 tablet by mouth daily.       Cholecalciferol (VITAMIN D) 1000 UNITS capsule Take 1 capsule by mouth daily.       aspirin 81 MG tablet Take 1 tablet by mouth daily.       Multiple Vitamin (DAILY MULTIVITAMIN PO) Take  by mouth daily.       finasteride (PROSCAR) 5 MG tablet Take 5 mg by mouth daily.  " "     Ascorbic Acid (VITAMIN C) 500 MG CAPS Take 250 mg by mouth daily.       Physical Examination:  /59  Pulse 63  Temp 98.6  F (37  C) (Oral)  Resp 12  Ht 1.676 m (5' 5.98\")  Wt 67.6 kg (149 lb)  SpO2 97%  BMI 24.06 kg/m2  Wt Readings from Last 10 Encounters:   07/27/17 67.6 kg (149 lb)   06/28/17 67.4 kg (148 lb 8 oz)   06/22/17 67.8 kg (149 lb 8 oz)   06/01/17 67.1 kg (147 lb 14.4 oz)   05/18/17 67 kg (147 lb 11.3 oz)   05/05/17 69.9 kg (154 lb)   04/27/17 68.4 kg (150 lb 12.7 oz)   03/30/17 70.3 kg (154 lb 15.7 oz)   03/16/17 70.5 kg (155 lb 6.8 oz)   11/09/16 70 kg (154 lb 5.2 oz)   General: alert, cooperative, no apparent distress  Skin: skin warm and dry, no rash, petechiae or ecchymoses  HEENT: normocephalic, atraumatic, PERRL, oral mucosa pink, pharynx without exudate.   Neck: neck supple   Respiratory: thorax is symmetric with good expansion, lungs clear to ausculation bilaterally, no wheezes, crackles or rhonchi  Cardiovascular: RRR, no murmurs or extra sounds  Gastrointestinal: active bowel sounds, soft, non-tender; no palpable masses or hepatosplenomegally  Peripheral Vascular: without edema.   Neurologic: CN II-XII grossly intact  Lymph: No cervical, supraclavicular, axillary adenopathy. Inguinal nodes on the left 4 cm x 1 cm and on the right 1 cm.     Laboratory Data:     Ref. Range 7/27/2017 13:23   Sodium Latest Ref Range: 133 - 144 mmol/L 140   Potassium Latest Ref Range: 3.4 - 5.3 mmol/L 3.7   Chloride Latest Ref Range: 94 - 109 mmol/L 107   Carbon Dioxide Latest Ref Range: 20 - 32 mmol/L 24   Urea Nitrogen Latest Ref Range: 7 - 30 mg/dL 21   Creatinine Latest Ref Range: 0.66 - 1.25 mg/dL 1.01   GFR Estimate Latest Ref Range: >60 mL/min/1.7m2 70   GFR Estimate If Black Latest Ref Range: >60 mL/min/1.7m2 84   Calcium Latest Ref Range: 8.5 - 10.1 mg/dL 8.5   Anion Gap Latest Ref Range: 3 - 14 mmol/L 8   Albumin Latest Ref Range: 3.4 - 5.0 g/dL 3.4   Protein Total Latest Ref Range: 6.8 - " 8.8 g/dL 6.6 (L)   Bilirubin Total Latest Ref Range: 0.2 - 1.3 mg/dL 0.5   Alkaline Phosphatase Latest Ref Range: 40 - 150 U/L 68   ALT Latest Ref Range: 0 - 70 U/L 18   AST Latest Ref Range: 0 - 45 U/L 14   Lactate Dehydrogenase Latest Ref Range: 85 - 227 U/L 205   Glucose Latest Ref Range: 70 - 99 mg/dL 199 (H)   WBC Latest Ref Range: 4.0 - 11.0 10e9/L 7.8   Hemoglobin Latest Ref Range: 13.3 - 17.7 g/dL 13.4   Hematocrit Latest Ref Range: 40.0 - 53.0 % 39.6 (L)   Platelet Count Latest Ref Range: 150 - 450 10e9/L 202   RBC Count Latest Ref Range: 4.4 - 5.9 10e12/L 4.47   MCV Latest Ref Range: 78 - 100 fl 89   MCH Latest Ref Range: 26.5 - 33.0 pg 30.0   MCHC Latest Ref Range: 31.5 - 36.5 g/dL 33.8   RDW Latest Ref Range: 10.0 - 15.0 % 17.4 (H)   Diff Method Unknown Automated Method   % Neutrophils Latest Units: % 81.8   % Lymphocytes Latest Units: % 9.9   % Monocytes Latest Units: % 6.8   % Eosinophils Latest Units: % 0.6   % Basophils Latest Units: % 0.5   % Immature Granulocytes Latest Units: % 0.4   Nucleated RBCs Latest Ref Range: 0 /100 0   Absolute Neutrophil Latest Ref Range: 1.6 - 8.3 10e9/L 6.4   Absolute Lymphocytes Latest Ref Range: 0.8 - 5.3 10e9/L 0.8   Absolute Monocytes Latest Ref Range: 0.0 - 1.3 10e9/L 0.5   Absolute Eosinophils Latest Ref Range: 0.0 - 0.7 10e9/L 0.1   Absolute Basophils Latest Ref Range: 0.0 - 0.2 10e9/L 0.0   Abs Immature Granulocytes Latest Ref Range: 0 - 0.4 10e9/L 0.0   Absolute Nucleated RBC Unknown 0.0       Assessment and Plan:  Anaplastic large cell lymphoma. He started on brentuximab on 5/5/17. He tolerated the first cycle fairly well, but did develop a rash, which has since resolved and has noticed some hair loss. His night sweats have resolved and the inguinal lymph nodes seems a bit smaller. He will follow up with Dr. Modi in 3 weeks with cycle 5. He will call sooner for concerns.     Dyspnea. History of smoking > 20 years ago. Emphysematous changes seen on last two  CT scans. To evaluate further the potential need for inhalers versus other causes of his dyspnea, we recommended he schedule a f/u with his PCP which he is planning to do.         Avril NGOC JOVANNY  [facility] [address] [address] [phone]

## 2017-07-27 NOTE — MR AVS SNAPSHOT
After Visit Summary   7/27/2017    Bobby Bethea    MRN: [id number]           Patient Information     Date Of Birth          7/2/1929        Visit Information        Provider Department      7/27/2017 2:30 PM  29 ATC;  ONCOLOGY INFUSION [facility]        Today's Diagnoses     Extranodal lymphoma (H)    -  1      Care Instructions    Contact Numbers    [facility] Main Line: [phone]  [facility] Triage:  [phone]    Call triage with chills and/or temperature greater than or equal to 100.5, uncontrolled nausea/vomiting, diarrhea, constipation, dizziness, shortness of breath, chest pain, bleeding, unexplained bruising, or any new/concerning symptoms, questions/concerns.     If you are having any concerning symptoms or wish to speak to a provider before your next infusion visit, please call your care coordinator or triage to notify them so we can adequately serve you.       After Hours: [phone]    If after hours, weekends, or holidays, call main hospital  and ask for Oncology doctor on call.         July 2017 Sunday Monday Tuesday Wednesday Thursday Friday Saturday                                 1       2  Happy Birthday!     3     4     5     6     7     8       9     10     11     12     13     14     15       16     17     18     19     20     21     22       23     24     25     26     27     [facility] DRAW    1:15 PM   (15 min.)   [facility] LAB DRAW   [facility] Lab Draw     [facility] RETURN    1:35 PM   (50 min.)   aRcheal Ni, ROBEL CNP   [facility] ONC INFUSION 60    2:30 PM   (60 min.)    ONCOLOGY INFUSION   [facility] 28 29       30     31 August 2017 Sunday Monday Tuesday Wednesday Thursday Friday Saturday             1     2     3     4     5       6     7     8     9     10     11     12       13     14     15     16     17     18     19       20      21     22     23     24     [facility] LAB DRAW    1:00 PM   (15 min.)   [facility] LAB DRAW   [facility] Lab Draw     [facility] RETURN    1:15 PM   (30 min.)   Eddie Modi MD   [facility] ONC INFUSION 60    2:00 PM   (60 min.)    ONCOLOGY INFUSION   [facility] 25     26       27     28     29     30     31                            Lab Results:  Recent Results (from the past 12 hour(s))   CBC with platelets differential    Collection Time: 07/27/17  1:23 PM   Result Value Ref Range    WBC 7.8 4.0 - 11.0 10e9/L    RBC Count 4.47 4.4 - 5.9 10e12/L    Hemoglobin 13.4 13.3 - 17.7 g/dL    Hematocrit 39.6 (L) 40.0 - 53.0 %    MCV 89 78 - 100 fl    MCH 30.0 26.5 - 33.0 pg    MCHC 33.8 31.5 - 36.5 g/dL    RDW 17.4 (H) 10.0 - 15.0 %    Platelet Count 202 150 - 450 10e9/L    Diff Method Automated Method     % Neutrophils 81.8 %    % Lymphocytes 9.9 %    % Monocytes 6.8 %    % Eosinophils 0.6 %    % Basophils 0.5 %    % Immature Granulocytes 0.4 %    Nucleated RBCs 0 0 /100    Absolute Neutrophil 6.4 1.6 - 8.3 10e9/L    Absolute Lymphocytes 0.8 0.8 - 5.3 10e9/L    Absolute Monocytes 0.5 0.0 - 1.3 10e9/L    Absolute Eosinophils 0.1 0.0 - 0.7 10e9/L    Absolute Basophils 0.0 0.0 - 0.2 10e9/L    Abs Immature Granulocytes 0.0 0 - 0.4 10e9/L    Absolute Nucleated RBC 0.0    Comprehensive metabolic panel    Collection Time: 07/27/17  1:23 PM   Result Value Ref Range    Sodium 140 133 - 144 mmol/L    Potassium 3.7 3.4 - 5.3 mmol/L    Chloride 107 94 - 109 mmol/L    Carbon Dioxide 24 20 - 32 mmol/L    Anion Gap 8 3 - 14 mmol/L    Glucose 199 (H) 70 - 99 mg/dL    Urea Nitrogen 21 7 - 30 mg/dL    Creatinine 1.01 0.66 - 1.25 mg/dL    GFR Estimate 70 >60 mL/min/1.7m2    GFR Estimate If Black 84 >60 mL/min/1.7m2    Calcium 8.5 8.5 - 10.1 mg/dL    Bilirubin Total 0.5 0.2 - 1.3 mg/dL    Albumin 3.4 3.4 - 5.0 g/dL    Protein Total 6.6 (L) 6.8 - 8.8 g/dL    Alkaline Phosphatase 68 40  - 150 U/L    ALT 18 0 - 70 U/L    AST 14 0 - 45 U/L   Lactate Dehydrogenase    Collection Time: 07/27/17  1:23 PM   Result Value Ref Range    Lactate Dehydrogenase 205 85 - 227 U/L               Follow-ups after your visit        Your next 10 appointments already scheduled     Aug 24, 2017  1:00 PM CDT   [facility] Draw with [facility] DRAW   [facility] Lab Draw ([facility])    [address] [place] [id number] [phone]            Aug 24, 2017  1:30 PM CDT   (Arrive by 1:15 PM)   Return Visit with Eddie Modi MD   [facility] ([facility])    [address] [place] [id number] [phone]            Aug 24, 2017  2:00 PM CDT   Infusion 60 with  ONCOLOGY INFUSION, UC 11 ATC   [facility] ([facility])    [address] [place] [address] [id number] [phone]              Who to contact     If you have questions or need follow up information about today's clinic visit or your schedule please contact [facility] directly at [phone].  Normal or non-critical lab and imaging results will be communicated to you by Cardio controlhart, letter or phone within 4 business days after the clinic has received the results. If you do not hear from us within 7 days, please contact the clinic through Cardio controlhart or phone. If you have a critical or abnormal lab result, we will notify you by phone as soon as possible.  Submit refill requests through AlertaPhone or call your pharmacy and they will forward the refill request to us. Please allow 3 business days for your refill to be completed.          Additional Information About Your Visit        AlertaPhone Information     AlertaPhone lets you send messages to your doctor, view your test results, renew your prescriptions, schedule appointments and more. To sign up, go to  [email or web address] . Click on \"Log in\" on the left side of the screen, which will take you to the Welcome page. Then click on \"Sign up Now\" on the right side of the page.     You will be asked to enter the access code listed below, as well as some personal information. Please follow the directions to create your username and password.     Your access code is: [email or web address]  Expires: 2017  3:04 PM     Your access code will  in 90 days. If you need help or a new code, please call your [facility] clinic or [phone].        Care EveryWhere ID     This is your Care EveryWhere ID. This could be used by other organizations to access your [facility] medical records  [id number]         Blood Pressure from Last 3 Encounters:   17 120/59   17 147/70   17 150/70    Weight from Last 3 Encounters:   17 67.6 kg (149 lb)   17 67.4 kg (148 lb 8 oz)   17 67.8 kg (149 lb 8 oz)              We Performed the Following     CBC with platelets differential     Comprehensive metabolic panel     Lactate Dehydrogenase        Primary Care Provider Office Phone # Fax #    [facility] [phone]       [address] [facility]        Equal Access to Services     TRAE OMALLEY : Hadii bishnu ku hadasho [employer], waaxda luqadaha, qaybta kaalmada adewilliamda, isabel cedeno. So [facility] [phone].    ATENCIÓN: Si habla español, tiene a frost disposición servicios gratuitos de asistencia lingüística. Chyna al [phone].    We comply with applicable federal civil rights laws and [place] laws. We do not discriminate on the basis of race, color, national origin, age, disability sex, sexual orientation or gender identity.            Thank you!     Thank you for choosing [facility]  for your care. Our goal is always to provide you with excellent care. Hearing back from our patients is one way we can continue to improve " our services. Please take a few minutes to complete the written survey that you may receive in the mail after your visit with us. Thank you!             Your Updated Medication List - Protect others around you: Learn how to safely use, store and throw away your medicines at www.disposemymeds.org.          This list is accurate as of: 7/27/17  3:49 PM.  Always use your most recent med list.                   Brand Name Dispense Instructions for use Diagnosis    aspirin 81 MG tablet      Take 1 tablet by mouth daily.        DAILY MULTIVITAMIN PO      Take  by mouth daily.        fluocinonide 0.05 % cream    LIDEX    120 g    Apply topically 2 times daily Apply topically twice daily as needed for itchy rash on body.    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 Mix with vanicream    Cutaneous lymphoma (H)       ibuprofen 600 MG tablet    ADVIL/MOTRIN     Take 600 mg by mouth        LORazepam 0.5 MG tablet    ATIVAN    30 tablet    Take 1 tablet (0.5 mg) by mouth every 4 hours as needed (Anxiety, Nausea/Vomiting or Sleep)    Extranodal lymphoma (H)       losartan 25 MG tablet    COZAAR    30 tablet    Take 1 tablet by mouth daily.    Unspecified essential hypertension       magnesium 250 MG tablet      Take 1 tablet by mouth daily.        prochlorperazine 5 MG tablet    COMPAZINE    30 tablet    Take 1 tablet (5 mg) by mouth every 6 hours as needed (Nausea/Vomiting)    Extranodal lymphoma (H)       PROSCAR 5 MG tablet   Generic drug:  finasteride      Take 5 mg by mouth daily.        TYLENOL PO      Take 325 mg by mouth daily as needed        Vitamin C 500 MG Caps      Take 250 mg by mouth daily.        vitamin D 1000 UNITS capsule      Take 1 capsule by mouth daily.

## 2017-07-27 NOTE — NURSING NOTE
Labs and vitals obtained without complication.    See flowsheets.    Lali Diaz CMA ([facility])

## 2017-07-27 NOTE — PROGRESS NOTES
Infusion Nursing Note:  Bobby Bethea presents today for C4D1 - Brentuximab.    Patient seen by provider today: Yes: MILTON Rowell   present during visit today: Not Applicable.    Note: Patient very [place]. States he is doing well and has no questions, concerns or need for med refills today.    Intravenous Access:  Peripheral IV placed.    Treatment Conditions:  Lab Results   Component Value Date    HGB 13.4 07/27/2017     Lab Results   Component Value Date    WBC 7.8 07/27/2017      Lab Results   Component Value Date    ANEU 6.4 07/27/2017     Lab Results   Component Value Date     07/27/2017      Lab Results   Component Value Date     07/27/2017                   Lab Results   Component Value Date    POTASSIUM 3.7 07/27/2017           Lab Results   Component Value Date    MAG 2.5 05/18/2017            Lab Results   Component Value Date    CR 1.01 07/27/2017                   Lab Results   Component Value Date    VINICIO 8.5 07/27/2017                Lab Results   Component Value Date    BILITOTAL 0.5 07/27/2017           Lab Results   Component Value Date    ALBUMIN 3.4 07/27/2017                    Lab Results   Component Value Date    ALT 18 07/27/2017           Lab Results   Component Value Date    AST 14 07/27/2017     Results reviewed, labs MET treatment parameters, ok to proceed with treatment.          Post Infusion Assessment:  Patient tolerated infusion without incident.  Blood return noted pre and post infusion.  Site patent and intact, free from redness, edema or discomfort.  No evidence of extravasations.  Access discontinued per protocol.    Discharge Plan:   Patient declined prescription refills.  Discharge instructions reviewed with: Patient.  Patient and/or family verbalized understanding of discharge instructions and all questions answered.  Copy of AVS reviewed with patient and/or family.  Patient will return 8/24/17 for next appointment.  Patient discharged in stable  condition accompanied by: self.  Departure Mode: Ambulatory.    Lala Goins RN

## 2017-07-27 NOTE — NURSING NOTE
"Oncology Rooming Note    July 27, 2017 1:52 PM   Bobby Bethea is a 88 year old male who presents for:    Chief Complaint   Patient presents with     Blood Draw     Oncology Clinic Visit     Lymphoma F/U     Initial Vitals: /59  Pulse 63  Temp 98.6  F (37  C) (Oral)  Resp 12  Ht 1.676 m (5' 5.98\")  Wt 67.6 kg (149 lb)  SpO2 97%  BMI 24.06 kg/m2 Estimated body mass index is 24.06 kg/(m^2) as calculated from the following:    Height as of this encounter: 1.676 m (5' 5.98\").    Weight as of this encounter: 67.6 kg (149 lb). Body surface area is 1.77 meters squared.  No Pain (0) Comment: \"same as always\"   No LMP for male patient.  Allergies reviewed: Yes  Medications reviewed: Yes    Medications: Medication refills not needed today.  Pharmacy name entered into [facility]: [id number] [place], [place] - [address]    Clinical concerns: None Racheal Ni was NOT notified.    7 minutes for nursing intake (face to face time)     Rosario Hall LPN              "

## 2017-08-24 NOTE — MR AVS SNAPSHOT
After Visit Summary   8/24/2017    Bobby Bethea    MRN: [id number]           Patient Information     Date Of Birth          7/2/1929        Visit Information        Provider Department      8/24/2017 2:00 PM UC 11 ATC; UC ONCOLOGY INFUSION [facility]        Today's Diagnoses     Extranodal lymphoma (H)    -  1      Care Instructions    Contact Numbers    [facility] Main Line: [phone]  [facility] Triage:  [phone]    Call triage with chills and/or temperature greater than or equal to 100.5, uncontrolled nausea/vomiting, diarrhea, constipation, dizziness, shortness of breath, chest pain, bleeding, unexplained bruising, or any new/concerning symptoms, questions/concerns.     If you are having any concerning symptoms or wish to speak to a provider before your next infusion visit, please call your care coordinator or triage to notify them so we can adequately serve you.       After Hours: [phone]    If after hours, weekends, or holidays, call main hospital  and ask for Oncology doctor on call.         August 2017 Sunday Monday Tuesday Wednesday Thursday Friday Saturday             1     2     3     4     5       6     7     8     9     10     11     12       13     14     15     16     17     18     19       20     21     22     23     24     [facility] LAB DRAW    1:00 PM   (15 min.)    [facility] LAB DRAW   [employer] Lab Draw     [facility] RETURN    1:15 PM   (30 min.)   Eddie Modi MD   [facility]    2:00 PM   (60 min.)    ONCOLOGY INFUSION   [facility] 25     26       27     28     29     30     31 September 2017 Sunday Monday Tuesday Wednesday Thursday Friday Saturday                            1     2       3     4     5     6     7     8     9       10     11     12     13     14     15     16       17     18     19     20     21     P [facility] LAB DRAW    1:30 PM    (15 min.)   [facility] LAB DRAW   [facility] Lab Draw     [facility] RETURN    1:45 PM   (30 min.)   Eddie Modi MD   [facility] ONC INFUSION 60    2:30 PM   (60 min.)    ONCOLOGY INFUSION   [facility] 22     23       24     25     26     27     28     29     30                  Lab Results:  Recent Results (from the past 12 hour(s))   CBC with platelets differential    Collection Time: 08/24/17 12:50 PM   Result Value Ref Range    WBC 9.4 4.0 - 11.0 10e9/L    RBC Count 4.79 4.4 - 5.9 10e12/L    Hemoglobin 14.2 13.3 - 17.7 g/dL    Hematocrit 42.7 40.0 - 53.0 %    MCV 89 78 - 100 fl    MCH 29.6 26.5 - 33.0 pg    MCHC 33.3 31.5 - 36.5 g/dL    RDW 15.8 (H) 10.0 - 15.0 %    Platelet Count 211 150 - 450 10e9/L    Diff Method Automated Method     % Neutrophils 83.8 %    % Lymphocytes 9.9 %    % Monocytes 5.3 %    % Eosinophils 0.3 %    % Basophils 0.4 %    % Immature Granulocytes 0.3 %    Nucleated RBCs 0 0 /100    Absolute Neutrophil 7.9 1.6 - 8.3 10e9/L    Absolute Lymphocytes 0.9 0.8 - 5.3 10e9/L    Absolute Monocytes 0.5 0.0 - 1.3 10e9/L    Absolute Eosinophils 0.0 0.0 - 0.7 10e9/L    Absolute Basophils 0.0 0.0 - 0.2 10e9/L    Abs Immature Granulocytes 0.0 0 - 0.4 10e9/L    Absolute Nucleated RBC 0.0    Basic metabolic panel    Collection Time: 08/24/17 12:50 PM   Result Value Ref Range    Sodium 138 133 - 144 mmol/L    Potassium 4.0 3.4 - 5.3 mmol/L    Chloride 104 94 - 109 mmol/L    Carbon Dioxide 25 20 - 32 mmol/L    Anion Gap 9 3 - 14 mmol/L    Glucose 176 (H) 70 - 99 mg/dL    Urea Nitrogen 17 7 - 30 mg/dL    Creatinine 0.98 0.66 - 1.25 mg/dL    GFR Estimate 72 >60 mL/min/1.7m2    GFR Estimate If Black 87 >60 mL/min/1.7m2    Calcium 8.7 8.5 - 10.1 mg/dL               Follow-ups after your visit        Your next 10 appointments already scheduled     Sep 21, 2017  1:30 PM HARIS   [facility] Lab Draw with  [facility] DRAW   [facility] Lab Draw ([facility]  "[facility])    [address] [place] [address]  2nd [facility] [address]   [phone]            Sep 21, 2017  2:00 PM CDT   (Arrive by 1:45 PM)   Return Visit with Eddie Modi MD   [facility])    [address] [place] [address]   [phone]            Sep 21, 2017  2:30 PM CDT   Infusion 60 with UC ONCOLOGY INFUSION, UC 29 ATC   [facility] ([facility])    [address] [place] [id number]   [phone]              Future tests that were ordered for you today     Open Future Orders        Priority Expected Expires Ordered    Comprehensive metabolic panel Routine 9/21/2017 8/24/2018 8/24/2017    CBC with platelets differential Routine 9/21/2017 8/24/2018 8/24/2017            Who to contact     If you have questions or need follow up information about today's clinic visit or your schedule please contact [facility]CER [facility] directly at [phone].  Normal or non-critical lab and imaging results will be communicated to you by Skyerahart, letter or phone within 4 business days after the clinic has received the results. If you do not hear from us within 7 days, please contact the clinic through P4RCt or phone. If you have a critical or abnormal lab result, we will notify you by phone as soon as possible.  Submit refill requests through Unfold or call your pharmacy and they will forward the refill request to us. Please allow 3 business days for your refill to be completed.          Additional Information About Your Visit        [employer] Information     Unfold lets you send messages to your doctor, view your test results, renew your prescriptions, schedule appointments and more. To sign up, go to [email or web address] . Click on \"Log in\" on the left side of the screen, which will take you to the Welcome page. Then click on \"Sign " "up Now\" on the right side of the page.     You will be asked to enter the access code listed below, as well as some personal information. Please follow the directions to create your username and password.     Your access code is: [email or web address]  Expires: 2017  3:04 PM     Your access code will  in 90 days. If you need help or a new code, please call your [facility] or [phone].        Care EveryWhere ID     This is your Care EveryWhere ID. This could be used by other organizations to access your [facility] medical records  [id number]         Blood Pressure from Last 3 Encounters:   17 133/65   17 120/59   17 147/70    Weight from Last 3 Encounters:   17 66.5 kg (146 lb 9.6 oz)   17 67.6 kg (149 lb)   17 67.4 kg (148 lb 8 oz)              We Performed the Following     Basic metabolic panel     CBC with platelets differential        Primary Care Provider Office Phone # Fax #    [facility] [phone]       [address] [facility]        Equal Access to Services     TRAE OMALLEY : Hadii bishnu ku hadasho Sosamreenali, waaxda luqadaha, qaybta kaalmada adejamiyada, isabel cedeno. So [facility] [phone].    ATENCIÓN: Si habla español, tiene a frost disposición servicios gratuitos de asistencia lingüística. Llame al [phone].    We comply with applicable federal civil rights laws and [place] laws. We do not discriminate on the basis of race, color, national origin, age, disability sex, sexual orientation or gender identity.            Thank you!     Thank you for choosing [facility]CER [facility]  for your care. Our goal is always to provide you with excellent care. Hearing back from our patients is one way we can continue to improve our services. Please take a few minutes to complete the written survey that you may receive in the mail after your visit with us. Thank you!             Your Updated " Medication List - Protect others around you: Learn how to safely use, store and throw away your medicines at www.disposemymeds.org.          This list is accurate as of: 8/24/17  3:50 PM.  Always use your most recent med list.                   Brand Name Dispense Instructions for use Diagnosis    aspirin 81 MG tablet      Take 1 tablet by mouth daily.        DAILY MULTIVITAMIN PO      Take  by mouth daily.        fluocinonide 0.05 % cream    LIDEX    120 g    Apply topically 2 times daily Apply topically twice daily as needed for itchy rash on body.    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 Mix with vanicream    Cutaneous lymphoma (H)       ibuprofen 600 MG tablet    ADVIL/MOTRIN     Take 600 mg by mouth        LORazepam 0.5 MG tablet    ATIVAN    30 tablet    Take 1 tablet (0.5 mg) by mouth every 4 hours as needed (Anxiety, Nausea/Vomiting or Sleep)    Extranodal lymphoma (H)       losartan 25 MG tablet    COZAAR    30 tablet    Take 1 tablet by mouth daily.    Unspecified essential hypertension       magnesium 250 MG tablet      Take 1 tablet by mouth daily.        prochlorperazine 5 MG tablet    COMPAZINE    30 tablet    Take 1 tablet (5 mg) by mouth every 6 hours as needed (Nausea/Vomiting)    Extranodal lymphoma (H)       PROSCAR 5 MG tablet   Generic drug:  finasteride      Take 5 mg by mouth daily.        TYLENOL PO      Take 325 mg by mouth daily as needed        Vitamin C 500 MG Caps      Take 250 mg by mouth daily.        vitamin D 1000 UNITS capsule      Take 1 capsule by mouth daily.

## 2017-08-24 NOTE — PATIENT INSTRUCTIONS
Contact Numbers    [facility] Main Line: [phone]  [facility] Triage:  [phone]    Call triage with chills and/or temperature greater than or equal to 100.5, uncontrolled nausea/vomiting, diarrhea, constipation, dizziness, shortness of breath, chest pain, bleeding, unexplained bruising, or any new/concerning symptoms, questions/concerns.     If you are having any concerning symptoms or wish to speak to a provider before your next infusion visit, please call your care coordinator or triage to notify them so we can adequately serve you.       After Hours: [phone]    If after hours, weekends, or holidays, call main hospital  and ask for Oncology doctor on call.         August 2017 Sunday Monday Tuesday Wednesday Thursday Friday Saturday             1     2     3     4     5       6     7     8     9     10     11     12       13     14     15     16     17     18     19       20     21     22     23     24     [facility] DRAW    1:00 PM   (15 min.)    [facility]   [facility]     [facility] RETURN    1:15 PM   (30 min.)   Eddie Modi MD   [facility]     [facility] ONC INFUSION 60    2:00 PM   (60 min.)    ONCOLOGY INFUSION   [facility] 25     26       27     28     29     30     31 September 2017 Sunday Monday Tuesday Wednesday Thursday Friday Saturday                            1     2       3     4     5     6     7     8     9       10     11     12     13     14     15     16       17     18     19     20     21     [facility] DRAW    1:30 PM   (15 min.)    [facility]RAW   [facility]     [facility] RETURN    1:45 PM   (30 min.)   Eddie [facility]   [facility]    2:30 PM   (60 min.)    ONCOLOGY INFUSION   [facility] 22     23       24     25     26     27     28     29     30                  Lab Results:  Recent Results (from the  past 12 hour(s))   CBC with platelets differential    Collection Time: 08/24/17 12:50 PM   Result Value Ref Range    WBC 9.4 4.0 - 11.0 10e9/L    RBC Count 4.79 4.4 - 5.9 10e12/L    Hemoglobin 14.2 13.3 - 17.7 g/dL    Hematocrit 42.7 40.0 - 53.0 %    MCV 89 78 - 100 fl    MCH 29.6 26.5 - 33.0 pg    MCHC 33.3 31.5 - 36.5 g/dL    RDW 15.8 (H) 10.0 - 15.0 %    Platelet Count 211 150 - 450 10e9/L    Diff Method Automated Method     % Neutrophils 83.8 %    % Lymphocytes 9.9 %    % Monocytes 5.3 %    % Eosinophils 0.3 %    % Basophils 0.4 %    % Immature Granulocytes 0.3 %    Nucleated RBCs 0 0 /100    Absolute Neutrophil 7.9 1.6 - 8.3 10e9/L    Absolute Lymphocytes 0.9 0.8 - 5.3 10e9/L    Absolute Monocytes 0.5 0.0 - 1.3 10e9/L    Absolute Eosinophils 0.0 0.0 - 0.7 10e9/L    Absolute Basophils 0.0 0.0 - 0.2 10e9/L    Abs Immature Granulocytes 0.0 0 - 0.4 10e9/L    Absolute Nucleated RBC 0.0    Basic metabolic panel    Collection Time: 08/24/17 12:50 PM   Result Value Ref Range    Sodium 138 133 - 144 mmol/L    Potassium 4.0 3.4 - 5.3 mmol/L    Chloride 104 94 - 109 mmol/L    Carbon Dioxide 25 20 - 32 mmol/L    Anion Gap 9 3 - 14 mmol/L    Glucose 176 (H) 70 - 99 mg/dL    Urea Nitrogen 17 7 - 30 mg/dL    Creatinine 0.98 0.66 - 1.25 mg/dL    GFR Estimate 72 >60 mL/min/1.7m2    GFR Estimate If Black 87 >60 mL/min/1.7m2    Calcium 8.7 8.5 - 10.1 mg/dL

## 2017-08-24 NOTE — PROGRESS NOTES
"Infusion Nursing Note:  Bobby Bethea presents today for Cycle 5, day 1 Brentuximab.    Patient seen by provider today: Yes: Dr. Modi    Note: Patient presents to clinic today feeling generally well.  During discussion, pt admits to feeling depressed for about a year now; losing interest in routine and \"feeling negative\".  Patient states that he has discussed with oncologist and will be pursuing restarting an oral antidepressant at next PCP appointment.  Active listening, offered counseling and/or vishnu services which patient declined at this time.  Discussed with Dr. Modi who is aware of this long standing complaint and agrees pt should pursue medication from PCP.  Patient verbalized understanding of plan.         Intravenous Access:  Peripheral IV placed.    Treatment Conditions:  Lab Results   Component Value Date    HGB 14.2 08/24/2017     Lab Results   Component Value Date    WBC 9.4 08/24/2017      Lab Results   Component Value Date    ANEU 7.9 08/24/2017     Lab Results   Component Value Date     08/24/2017      Lab Results   Component Value Date     08/24/2017                   Lab Results   Component Value Date    POTASSIUM 4.0 08/24/2017           Lab Results   Component Value Date    MAG 2.5 05/18/2017            Lab Results   Component Value Date    CR 0.98 08/24/2017                   Lab Results   Component Value Date    VINICIO 8.7 08/24/2017                Lab Results   Component Value Date    BILITOTAL 0.5 07/27/2017           Lab Results   Component Value Date    ALBUMIN 3.4 07/27/2017                    Lab Results   Component Value Date    ALT 18 07/27/2017           Lab Results   Component Value Date    AST 14 07/27/2017     Results reviewed, labs MET treatment parameters, ok to proceed with treatment.    Post Infusion Assessment:  Patient tolerated infusion without incident.  Blood return noted pre and post infusion.  Site patent and intact, free from redness, edema or " discomfort.  No evidence of extravasations.  Access discontinued per protocol.    Discharge Plan:   Patient declined prescription refills.  Discharge instructions reviewed with: Patient.  Patient and/or family verbalized understanding of discharge instructions and all questions answered.  Copy of AVS reviewed with patient and/or family.  Patient will return 9/21/2017 for next appointment.  Departure Mode: Ambulatory.  Face to Face time: 15 minutes.    Mckenzie Monet RN

## 2017-08-24 NOTE — Clinical Note
8/24/2017       RE: Bobby Bethea  [address] [address]     Dear Colleague,    Thank you for referring your patient, Bobby Bethea, to the [facility]. Please see a copy of my visit note below.    No notes on file    Again, thank you for allowing me to participate in the care of your patient.      Sincerely,    Eddie Modi MD

## 2017-08-24 NOTE — NURSING NOTE
Chief Complaint   Patient presents with     Oncology Clinic Visit     lymphoma      Blood Draw     labs drawn with IV start by rn.  vs taken.     Labs drawn with IV start by rn.  Vs taken.  Pt checked in to next appointment.  Oxana Chavez RN

## 2017-08-24 NOTE — LETTER
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  He has had 3 treatments for the extra cutaneous CD30-positive disease, including -- 12/1998 (CHOP - good response), 2012 (relapse treated with CVP - mixed response), and then 8 cycles of brentuximab vedotin completed on 05/21/2013 . He had a good clinical response including in the nodes and skin with Bretuximab therapy .Until recently, he has had no evidence of recurrent dorothea disease since the brentuximab. However, in early 03/2017 he began to have fevers and sweats, and a 3-4 cm node in the left inguinal region and a cutaneous nodule above his left knee. An abdominal CT showed new enlarged lymph nodes in the left iliac and inguinofemoral regions. He was seen again on 03/30/2017 to review the results and obtain a FNA of a left groin node, which was positive for CD30+ lymphoma.He started Brentuximab on 5/5/2017. Cycle 1 was associated with rash. Cycle #2 was done on 6/1/2017 at which time he had EKG for shortness of breath. EKG revealed PVC's, but no other findings. Symptoms subsequently improved.     INTERVAL HISTORY:  Mr. Bethea received his fourth cycle of brentuximab beginning 07/27/2017.  Again, he tolerated the treatment quite well.  Although he felt \"terrible\" afterwards, when pressed, he states he feels \"terrible\" all the time.  This has not changed.  He has no significant progressive neuropathy.  He has no nausea or vomiting, stomatitis, anorexia, nausea or new bowel complaints.  He feels that his skin overall is improving.  He is not certain he can still feel the left inguinal node that was prominent at the time the treatment started.       The patient is relatively inactive.  In part, this reflects his fear of being active, but also his " ongoing reluctance to do much of anything. With activity he experiences some dyspnea, which may reflect deconditioning.  Although his emphysema has been described to him in the past, he was surprised when it was mentioned.  He does have a remote history of smoking.        Overall, he is feeling about the same, including anxiety/depression. Offered help in the past, including counseling, which has been rejected.     REVIEW OF SYSTEMS:  Otherwise negative.       MEDICATIONS:   1.  Fluocinonide cream.   2.  Losartan.    3.  Acetaminophen p.r.n.   4.  Vitamin D.   5.  Aspirin 81 mg.   6.  Multivitamins.   7.  Finasteride.   8.  Lorazepam.   9.  Prochlorperazine (as needed with chemotherapy).   10.  Ibuprofen p.r.n.   11.  Hydrocortisone cream.   12.  Magnesium.   13.  Vitamin C.       ALLERGIES:  BEE VENOM, ATENOLOL.       PHYSICAL EXAMINATION:   VITAL SIGNS:  Weight 66.5 kg (stable), blood pressure 133/65, pulse 79 and regular, respirations 16, temperature 98.1, O2 saturation 95% on room air.   HEENT:  Anicteric.  No conjunctival injection.  No oropharyngeal masses or mucosal ulcerations/lesions.   NECK:  No JVD.  No nodes.   CHEST:  Clear to auscultation.   HEART:  Regular rhythm without murmur.  No gallop.    ABDOMEN:  Soft and nontender.  No detectable organomegaly.  No masses.  No clearly identifiable inguinal/femoral nodes.  However, there is irregularity on both sides.    EXTREMITIES:  No lower extremity edema.   SKIN:  Generally in good shape.  There is a large, erythematous patch over the right posterolateral chest.  The skin is intact, soft and without desquamation, scaling or crusting.  A few other similar areas.  Nails - Beau's lines near the distal end of all nails.       LABORATORY:     Hemoglobin 14.2 grams percent with 9,400 WBCs (normal differential) and 211,000 platelets.   Electrolytes, calcium, creatinine (0.98) - normal.     Glucose 176 (non-fasting).      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  The patient has now received a total of 4 cycles of brentuximab, tolerating treatment quite well.  He is due for cycle #5 today.  Clinical examination indicates a good response.       Plan to continue with cycle #5 today and he will return in 4 weeks for cycle #6.  Approximately 6 weeks after that treatment, plan for CT imaging to assess the overall status of his disease and, if the response is satisfactory, provide a baseline for subsequent followup.      The patient continues to have some dental issues that he would like to have addressed.  At present, will put this off until the course of brentuximab is completed.  If there is an urgent need, would welcome contact with his dentist to discuss the situation and appropriate precautions.       I spent approximately 30 minutes with the patient, the majority of time in counseling.      Eddie Modi MD  Professor of Medicine  Oncology  [facility]  Office: [phone]  Clinic Fax: [phone]       cc:      MD Grey Sanchez MD Bruce A. Peterson, MD

## 2017-08-24 NOTE — PROGRESS NOTES
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  He has had 3 treatments for the extra cutaneous CD30-positive disease, including -- 12/1998 ([facility] - good response), 2012 (relapse treated with CVP - mixed response), and then 8 cycles of brentuximab vedotin completed on 05/21/2013 . He had a good clinical response including in the nodes and skin with Bretuximab therapy .Until recently, he has had no evidence of recurrent dorothea disease since the brentuximab. However, in early 03/2017 he began to have fevers and sweats, and a 3-4 cm node in the left inguinal region and a cutaneous nodule above his left knee. An abdominal CT showed new enlarged lymph nodes in the left iliac and inguinofemoral regions. He was seen again on 03/30/2017 to review the results and obtain a FNA of a left groin node, which was positive for CD30+ lymphoma.He started Brentuximab on 5/5/2017. Cycle 1 was associated with rash. Cycle #2 was done on 6/1/2017 at which time he had EKG for shortness of breath. EKG revealed PVC's, but no other findings. Symptoms subsequently improved.     INTERVAL HISTORY:  Mr. Bethea received his fourth cycle of brentuximab beginning 07/27/2017.  Again, he tolerated the treatment quite well.  Although he felt \"terrible\" afterwards, when pressed, he states he feels \"terrible\" all the time.  This has not changed.  He has no significant progressive neuropathy.  He has no nausea or vomiting, stomatitis, anorexia, nausea or new bowel complaints.  He feels that his skin overall is improving.  He is not certain he can still feel the left inguinal node that was prominent at the time the treatment started.       The patient is relatively inactive.  In part, this reflects his fear of being active, but also his ongoing reluctance to do much of anything. With activity he experiences some dyspnea, which may " reflect deconditioning.  Although his emphysema has been described to him in the past, he was surprised when it was mentioned.  He does have a remote history of smoking.        Overall, he is feeling about the same, including anxiety/depression. Offered help in the past, including counseling, which has been rejected.     REVIEW OF SYSTEMS:  Otherwise negative.       MEDICATIONS:   1.  Fluocinonide cream.   2.  Losartan.    3.  Acetaminophen p.r.n.   4.  Vitamin D.   5.  Aspirin 81 mg.   6.  Multivitamins.   7.  Finasteride.   8.  Lorazepam.   9.  Prochlorperazine (as needed with chemotherapy).   10.  Ibuprofen p.r.n.   11.  Hydrocortisone cream.   12.  Magnesium.   13.  Vitamin C.       ALLERGIES:  BEE VENOM, ATENOLOL.       PHYSICAL EXAMINATION:   VITAL SIGNS:  Weight 66.5 kg (stable), blood pressure 133/65, pulse 79 and regular, respirations 16, temperature 98.1, O2 saturation 95% on room air.   HEENT:  Anicteric.  No conjunctival injection.  No oropharyngeal masses or mucosal ulcerations/lesions.   NECK:  No JVD.  No nodes.   CHEST:  Clear to auscultation.   HEART:  Regular rhythm without murmur.  No gallop.    ABDOMEN:  Soft and nontender.  No detectable organomegaly.  No masses.  No clearly identifiable inguinal/femoral nodes.  However, there is irregularity on both sides.    EXTREMITIES:  No lower extremity edema.   SKIN:  Generally in good shape.  There is a large, erythematous patch over the right posterolateral chest.  The skin is intact, soft and without desquamation, scaling or crusting.  A few other similar areas.  Nails - Beau's lines near the distal end of all nails.       LABORATORY:     Hemoglobin 14.2 grams percent with 9,400 WBCs (normal differential) and 211,000 platelets.   Electrolytes, calcium, creatinine (0.98) - normal.     Glucose 176 (non-fasting).      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  The patient has now  received a total of 4 cycles of brentuximab, tolerating treatment quite well.  He is due for cycle #5 today.  Clinical examination indicates a good response.       Plan to continue with cycle #5 today and he will return in 4 weeks for cycle #6.  Approximately 6 weeks after that treatment, plan for CT imaging to assess the overall status of his disease and, if the response is satisfactory, provide a baseline for subsequent followup.      The patient continues to have some dental issues that he would like to have addressed.  At present, will put this off until the course of brentuximab is completed.  If there is an urgent need, would welcome contact with his dentist to discuss the situation and appropriate precautions.       I spent approximately 30 minutes with the patient, the majority of time in counseling.      Eddie Modi MD  Professor of Medicine  Oncology  [facility]  Office: [phone]  Clinic Fax: [phone]       cc:      MD Grey Sanchez MD

## 2017-08-24 NOTE — NURSING NOTE
"Oncology Rooming Note    August 24, 2017 12:56 PM   Bobby Bethea is a 88 year old male who presents for:    Chief Complaint   Patient presents with     Oncology Clinic Visit     lymphoma      Blood Draw     labs drawn with IV start by rn.  vs taken.     Initial Vitals: /65 (BP Location: Right arm, Patient Position: Chair, Cuff Size: Adult Regular)  Pulse 79  Temp 98.1  F (36.7  C) (Oral)  Resp 16  Wt 66.5 kg (146 lb 9.6 oz)  SpO2 95%  BMI 23.67 kg/m2 Estimated body mass index is 23.67 kg/(m^2) as calculated from the following:    Height as of 7/27/17: 1.676 m (5' 5.98\").    Weight as of this encounter: 66.5 kg (146 lb 9.6 oz). Body surface area is 1.76 meters squared.  No Pain (0) Comment: Data Unavailable   No LMP for male patient.  Allergies reviewed: Yes  Medications reviewed: Yes    Medications: Medication refills not needed today.  Pharmacy name entered into [employer]: [id number] - [place], [place] - [address]    Clinical concerns: no doc was NOT notified.    5 minutes for nursing intake (face to face time)     Shweta Arenas CMA              "

## 2017-08-24 NOTE — MR AVS SNAPSHOT
After Visit Summary   8/24/2017    Bobby Bethea    MRN: [id number]           Patient Information     Date Of Birth          7/2/1929        Visit Information        Provider Department      8/24/2017 1:30 PM Eddie Modi MD [facility]        Today's Diagnoses     Extranodal lymphoma (H)    -  1       Follow-ups after your visit        Follow-up notes from your care team     Return in about 4 weeks (around 9/21/2017) for Lab Work, Physical Exam, chemoRx.      Your next 10 appointments already scheduled     Sep 21, 2017  1:30 PM CDT   [facility] Lab Draw with  [facility]RAW   [facility] Draw ([facility])    [address] [place] [id number] [phone]            Sep 21, 2017  2:00 PM CDT   (Arrive by 1:45 PM)   Return Visit with Eddie Modi MD   [facility] ([facility])    [address] [place] [id number] [phone]            Sep 21, 2017  2:30 PM CDT   Infusion 60 with  ONCOLOGY INFUSION, UC 29 ATC   [facility] ([facility])    [address] [place] [id number] [phone]              Who to contact     If you have questions or need follow up information about today's clinic visit or your schedule please contact [facility] directly at [phone].  Normal or non-critical lab and imaging results will be communicated to you by MyChart, letter or phone within 4 business days after the clinic has received the results. If you do not hear from us within 7 days, please contact the clinic through MyChart or phone. If you have a critical or abnormal lab result, we will notify you by phone as soon as possible.  Submit refill requests through [employer] or call your pharmacy and they will forward the refill request to us. Please allow 3 business  "days for your refill to be completed.          Additional Information About Your Visit        MyChart Information     [employer] lets you send messages to your doctor, view your test results, renew your prescriptions, schedule appointments and more. To sign up, go to [email or web address] . Click on \"Log in\" on the left side of the screen, which will take you to the Welcome page. Then click on \"Sign up Now\" on the right side of the page.     You will be asked to enter the access code listed below, as well as some personal information. Please follow the directions to create your username and password.     Your access code is: [email or web address]  Expires: 2017  3:04 PM     Your access code will  in 90 days. If you need help or a new code, please call your [facility] clinic or [phone].        Care EveryWhere ID     This is your [facility] EveryWhere ID. This could be used by other organizations to access your [facility] medical records  [id number]        Your Vitals Were     Pulse Temperature Respirations Pulse Oximetry BMI (Body Mass Index)       79 98.1  F (36.7  C) (Oral) 16 95% 23.67 kg/m2        Blood Pressure from Last 3 Encounters:   17 133/65   17 120/59   17 147/70    Weight from Last 3 Encounters:   17 66.5 kg (146 lb 9.6 oz)   17 67.6 kg (149 lb)   17 67.4 kg (148 lb 8 oz)               Primary Care Provider Office Phone # Fax #    [facility] [phone]       [address] [facility]        Equal Access to Services     TRAE OMALLEY : Hadii bishnu Lee, issac muller, isabel martinez. So [facility] [phone].    ATENCIÓN: Si habla español, tiene a frost disposición servicios gratuitos de asistencia lingüística. Llame al [phone].    We comply with applicable federal civil rights laws and [place] laws. We do not discriminate on the basis of race, color, national " origin, age, disability sex, sexual orientation or gender identity.            Thank you!     Thank you for choosing [facility]  for your care. Our goal is always to provide you with excellent care. Hearing back from our patients is one way we can continue to improve our services. Please take a few minutes to complete the written survey that you may receive in the mail after your visit with us. Thank you!             Your Updated Medication List - Protect others around you: Learn how to safely use, store and throw away your medicines at www.disposemymeds.org.          This list is accurate as of: 8/24/17 11:59 PM.  Always use your most recent med list.                   Brand Name Dispense Instructions for use Diagnosis    aspirin 81 MG tablet      Take 1 tablet by mouth daily.        DAILY MULTIVITAMIN PO      Take  by mouth daily.        fluocinonide 0.05 % cream    LIDEX    120 g    Apply topically 2 times daily Apply topically twice daily as needed for itchy rash on body.    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 Mix with vanicream    Cutaneous lymphoma (H)       ibuprofen 600 MG tablet    ADVIL/MOTRIN     Take 600 mg by mouth        LORazepam 0.5 MG tablet    ATIVAN    30 tablet    Take 1 tablet (0.5 mg) by mouth every 4 hours as needed (Anxiety, Nausea/Vomiting or Sleep)    Extranodal lymphoma (H)       losartan 25 MG tablet    COZAAR    30 tablet    Take 1 tablet by mouth daily.    Unspecified essential hypertension       magnesium 250 MG tablet      Take 1 tablet by mouth daily.        prochlorperazine 5 MG tablet    COMPAZINE    30 tablet    Take 1 tablet (5 mg) by mouth every 6 hours as needed (Nausea/Vomiting)    Extranodal lymphoma (H)       PROSCAR 5 MG tablet   Generic drug:  finasteride      Take 5 mg by mouth daily.        TYLENOL PO      Take 325 mg by mouth daily as  needed        Vitamin C 500 MG Caps      Take 250 mg by mouth daily.        vitamin D 1000 UNITS capsule      Take 1 capsule by mouth daily.

## 2017-09-21 NOTE — PROGRESS NOTES
Infusion Nursing Note:  Pt presents today for C6 D1 Brentuximab.     present during visit today: Not Applicable.  Patient seen by Dr. Modi prior to infusion.    Lab Results   Component Value Date    HGB 13.7 09/21/2017     Lab Results   Component Value Date    WBC 9.7 09/21/2017      Lab Results   Component Value Date    ANEU 8.0 09/21/2017     Lab Results   Component Value Date     09/21/2017      Lab Results   Component Value Date     09/21/2017                   Lab Results   Component Value Date    POTASSIUM 3.7 09/21/2017           Lab Results   Component Value Date    MAG 2.5 05/18/2017            Lab Results   Component Value Date    CR 0.97 09/21/2017                   Lab Results   Component Value Date    VINICIO 8.4 09/21/2017                Lab Results   Component Value Date    BILITOTAL 0.6 09/21/2017           Lab Results   Component Value Date    ALBUMIN 3.5 09/21/2017                    Lab Results   Component Value Date    ALT 19 09/21/2017           Lab Results   Component Value Date    AST 16 09/21/2017     Results reviewed, labs MET treatment parameters    Note: N/A.  Proceed with treatment.    Intravenous Access:  Peripheral IV placed in lab    Post Infusion Assessment:  Patient tolerated infusion without incident.  Blood return noted pre and post infusion.  PIV flushed and dc'd intact at end of infusion.    Discharge Plan:   Patient declined prescription refills.  Discharge instructions reviewed with: Patient.  Patient and/or family verbalized understanding of discharge instructions and all questions answered.  Copy of AVS reviewed with patient and/or family.  Pt will return 11/9 for CT and provider appts.

## 2017-09-21 NOTE — MR AVS SNAPSHOT
After Visit Summary   9/21/2017    Bobby Bethea    MRN: [id number]           Patient Information     Date Of Birth          7/2/1929        Visit Information        Provider Department      9/21/2017 2:30 PM  29 ATC;  ONCOLOGY INFUSION [facility]        Today's Diagnoses     Extranodal lymphoma (H)    -  1      Care Instructions    Contact Numbers  [facility] Main Line/After Hours: [phone]  [facility] Triage line: [phone]      Please call the triage or after hours line if you experience a temperature greater than or equal to 100.5, shaking chills, have uncontrolled nausea, vomiting and/or diarrhea, dizziness, shortness of breath, chest pain, bleeding, unexplained bruising, or if you have any other new/concerning symptoms, questions or concerns.      If you are having any concerning symptoms or wish to speak to a provider before your next infusion visit, please call to notify us so we can adequately serve you.     If you need a refill on a narcotic prescription or other medication, please call before your infusion appointment.               September 2017 Sunday Monday Tuesday Wednesday Thursday Friday Saturday                            1     2       3     4     5     6     7     8     9       10     11     12     13     14     15     16       17     18     19     20     21     [facility] LAB DRAW    1:30 PM   (15 min.)    [facility] DRAW   [facility] Lab Draw     P RETURN    1:45 PM   (30 min.)   Eddie Modi MD   [facility] ONC INFUSION 60    2:30 PM   (60 min.)   [facility] ONCOLOGY INFUSION   [facility] 22     23       24     25     26     27     28     29     30 October 2017 Sunday Monday Tuesday Wednesday Thursday Friday Saturday   1     2     3     4     5     6     7       8     9     10     11     12     13     14       15     16     17     18     19     20     21       22     23     24      25     26     27     28       29     30     31                                      Lab Results:  Recent Results (from the past 12 hour(s))   Comprehensive metabolic panel    Collection Time: 09/21/17  1:43 PM   Result Value Ref Range    Sodium 143 133 - 144 mmol/L    Potassium 3.7 3.4 - 5.3 mmol/L    Chloride 109 94 - 109 mmol/L    Carbon Dioxide 27 20 - 32 mmol/L    Anion Gap 6 3 - 14 mmol/L    Glucose 177 (H) 70 - 99 mg/dL    Urea Nitrogen 22 7 - 30 mg/dL    Creatinine 0.97 0.66 - 1.25 mg/dL    GFR Estimate 73 >60 mL/min/1.7m2    GFR Estimate If Black 88 >60 mL/min/1.7m2    Calcium 8.4 (L) 8.5 - 10.1 mg/dL    Bilirubin Total 0.6 0.2 - 1.3 mg/dL    Albumin 3.5 3.4 - 5.0 g/dL    Protein Total 6.9 6.8 - 8.8 g/dL    Alkaline Phosphatase 70 40 - 150 U/L    ALT 19 0 - 70 U/L    AST 16 0 - 45 U/L   CBC with platelets differential    Collection Time: 09/21/17  1:43 PM   Result Value Ref Range    WBC 9.7 4.0 - 11.0 10e9/L    RBC Count 4.55 4.4 - 5.9 10e12/L    Hemoglobin 13.7 13.3 - 17.7 g/dL    Hematocrit 40.5 40.0 - 53.0 %    MCV 89 78 - 100 fl    MCH 30.1 26.5 - 33.0 pg    MCHC 33.8 31.5 - 36.5 g/dL    RDW 15.4 (H) 10.0 - 15.0 %    Platelet Count 185 150 - 450 10e9/L    Diff Method Automated Method     % Neutrophils 82.4 %    % Lymphocytes 10.4 %    % Monocytes 6.3 %    % Eosinophils 0.4 %    % Basophils 0.3 %    % Immature Granulocytes 0.2 %    Nucleated RBCs 0 0 /100    Absolute Neutrophil 8.0 1.6 - 8.3 10e9/L    Absolute Lymphocytes 1.0 0.8 - 5.3 10e9/L    Absolute Monocytes 0.6 0.0 - 1.3 10e9/L    Absolute Eosinophils 0.0 0.0 - 0.7 10e9/L    Absolute Basophils 0.0 0.0 - 0.2 10e9/L    Abs Immature Granulocytes 0.0 0 - 0.4 10e9/L    Absolute Nucleated RBC 0.0                Follow-ups after your visit        Your next 10 appointments already scheduled     Nov 09, 2017 11:30 AM [facility] Lab Draw with  [facility] DRAW   [facility] Draw ([facility])    [address] [place] [address]  [facility] [address]   [phone]            Nov 09, 2017 12:00 PM CST   (Arrive by 11:45 AM)   CT CHEST/ABDOMEN/PELVIS W CONTRAST with UCCT2   [facility] ([facility])    [address] [facility] [address]  [address] [facility] [address]   [phone]           Please bring any scans or X-rays taken at other hospitals, if similar tests were done. Also bring a list of your medicines, including vitamins, minerals and over-the-counter drugs. It is safest to leave personal items at home.  Be sure to tell your doctor:   If you have any allergies.   If there s any chance you are pregnant.   If you are breastfeeding.   If you have any special needs.  You may have contrast for this exam. To prepare:   Do not eat or drink for 2 hours before your exam. If you need to take medicine, you may take it with small sips of water. (We may ask you to take liquid medicine as well.)   The day before your exam, drink extra fluids at least six 8-ounce glasses (unless your doctor tells you to restrict your fluids).  Patients over 70 or patients with diabetes or kidney problems:   If you haven t had a blood test (creatinine test) within the last 30 days, go to your clinic or Diagnostic Imaging Department for this test.  If you have diabetes:   If your kidney function is normal, continue taking your metformin (Avandamet, Glucophage, Glucovance, Metaglip) on the day of your exam.   If your kidney function is abnormal, wait 48 hours before restarting this medicine.  You will have oral contrast for this exam:   You will drink the contrast at home. Get this from your clinic or Diagnostic Imaging Department. Please follow the directions given.  Please wear loose clothing, such as a sweat suit or jogging clothes. Avoid snaps, zippers and other metal. We may ask you to undress and put on a hospital gown.  If you have any questions, please call the Imaging Department where you will have your exam.     "        Nov 09, 2017  1:00 PM CST   (Arrive by 12:45 PM)   Return Visit with Eddie Modi MD   [facility] ([facility])    [address] [address] [phone]              Future tests that were ordered for you today     Open Future Orders        Priority Expected Expires Ordered    Comprehensive metabolic panel Routine 11/2/2017 9/21/2018 9/21/2017    Protein electrophoresis Routine 11/2/2017 9/21/2018 9/21/2017    Lactate Dehydrogenase Routine 11/2/2017 9/21/2018 9/21/2017    Uric acid Routine 11/2/2017 9/21/2018 9/21/2017    *CBC with platelets differential Routine 11/2/2017 9/21/2018 9/21/2017    CT Chest/Abdomen/Pelvis w Contrast Routine 11/2/2017 9/21/2018 9/21/2017            Who to contact     If you have questions or need follow up information about today's clinic visit or your schedule please contact [facility] directly at [phone].  Normal or non-critical lab and imaging results will be communicated to you by [employer], letter or phone within 4 business days after the clinic has received the results. If you do not hear from us within 7 days, please contact the clinic through [employer] or phone. If you have a critical or abnormal lab result, we will notify you by phone as soon as possible.  Submit refill requests through [employer] or call your pharmacy and they will forward the refill request to us. Please allow 3 business days for your refill to be completed.          Additional Information About Your Visit        [employer] Information     [employer] lets you send messages to your doctor, view your test results, renew your prescriptions, schedule appointments and more. To sign up, go to [email or web address] . Click on \"Log in\" on the left side of the screen, which will take you to the Welcome page. Then click on \"Sign up Now\" on the right side of the page.     You will be asked to enter the access code " listed below, as well as some personal information. Please follow the directions to create your username and password.     Your access code is: [email or web address]  Expires: 2017  4:38 PM     Your access code will  in 90 days. If you need help or a new code, please call your [place] clinic or [phone].        Care EveryWhere ID     This is your Care EveryWhere ID. This could be used by other organizations to access your [place] medical records  [id number]         Blood Pressure from Last 3 Encounters:   17 139/65   17 133/65   17 120/59    Weight from Last 3 Encounters:   17 67.4 kg (148 lb 9.6 oz)   17 66.5 kg (146 lb 9.6 oz)   17 67.6 kg (149 lb)              We Performed the Following     CBC with platelets differential     Comprehensive metabolic panel          Today's Medication Changes          These changes are accurate as of: 17  4:38 PM.  If you have any questions, ask your nurse or doctor.               Stop taking these medicines if you haven't already. Please contact your care team if you have questions.     Vitamin C 500 MG Caps   Stopped by:  Eddie Modi MD                    Primary Care Provider Office Phone # Fax #    [place] [facility] [phone]       [address] [facility]        Equal Access to Services     JOSE [facility] : Hadnaomi Lee, waaxda yohana, qaybta kaalwilner zimmer, isabel abdi . [facility] [phone].    ATENCIÓN: Si habla español, tiene a frost disposición servicios gratuitos de asistencia lingüística. Chyna al [phone].    We comply with applicable federal civil rights laws and [place] laws. We do not discriminate on the basis of race, color, national origin, age, disability sex, sexual orientation or gender identity.            Thank you!     Thank you for choosing [facility]  for your care. Our goal is always to  provide you with excellent care. Hearing back from our patients is one way we can continue to improve our services. Please take a few minutes to complete the written survey that you may receive in the mail after your visit with us. Thank you!             Your Updated Medication List - Protect others around you: Learn how to safely use, store and throw away your medicines at www.disposemymeds.org.          This list is accurate as of: 9/21/17  4:38 PM.  Always use your most recent med list.                   Brand Name Dispense Instructions for use Diagnosis    aspirin 81 MG tablet      Take 1 tablet by mouth daily.        DAILY MULTIVITAMIN PO      Take  by mouth daily.        fluocinonide 0.05 % cream    LIDEX    120 g    Apply topically 2 times daily Apply topically twice daily as needed for itchy rash on body.    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 Mix with vanicream    Cutaneous lymphoma (H)       ibuprofen 600 MG tablet    ADVIL/MOTRIN     Take 600 mg by mouth        LORazepam 0.5 MG tablet    ATIVAN    30 tablet    Take 1 tablet (0.5 mg) by mouth every 4 hours as needed (Anxiety, Nausea/Vomiting or Sleep)    Extranodal lymphoma (H)       losartan 25 MG tablet    COZAAR    30 tablet    Take 1 tablet by mouth daily.    Unspecified essential hypertension       magnesium 250 MG tablet      Take 1 tablet by mouth daily.        MELATONIN PO      Take 5 mg by mouth At Bedtime    Extranodal lymphoma (H)       prochlorperazine 5 MG tablet    COMPAZINE    30 tablet    Take 1 tablet (5 mg) by mouth every 6 hours as needed (Nausea/Vomiting)    Extranodal lymphoma (H)       PROSCAR 5 MG tablet   Generic drug:  finasteride      Take 5 mg by mouth daily.        TYLENOL PO      Take 325 mg by mouth daily as needed        vitamin D 1000 UNITS capsule      Take 1 capsule by mouth daily.

## 2017-09-21 NOTE — PATIENT INSTRUCTIONS
Contact Numbers  [facility] Main Line/After Hours: [phone]  [facility] Triage line: [phone]      Please call the triage or after hours line if you experience a temperature greater than or equal to 100.5, shaking chills, have uncontrolled nausea, vomiting and/or diarrhea, dizziness, shortness of breath, chest pain, bleeding, unexplained bruising, or if you have any other new/concerning symptoms, questions or concerns.      If you are having any concerning symptoms or wish to speak to a provider before your next infusion visit, please call to notify us so we can adequately serve you.     If you need a refill on a narcotic prescription or other medication, please call before your infusion appointment.               September 2017 Sunday Monday Tuesday Wednesday Thursday Friday Saturday                            1     2       3     4     5     6     7     8     9       10     11     12     13     14     15     16       17     18     19     20     21     [facility] LAB DRAW    1:30 PM   (15 min.)   [facility] LAB DRAW   [facility] Lab Draw     [facility] RETURN    1:45 PM   (30 min.)   Eddie Modi MD   [facility] ONC INFUSION 60    2:30 PM   (60 min.)    ONCOLOGY INFUSION   [facility] 22     23       24     25     26     27     28     29     30                October 2017 Sunday Monday Tuesday Wednesday Thursday Friday Saturday   1     2     3     4     5     6     7       8     9     10     11     12     13     14       15     16     17     18     19     20     21       22     23     24     25     26     27     28       29     30     31                                      Lab Results:  Recent Results (from the past 12 hour(s))   Comprehensive metabolic panel    Collection Time: 09/21/17  1:43 PM   Result Value Ref Range    Sodium 143 133 - 144 mmol/L    Potassium 3.7 3.4 - 5.3 mmol/L    Chloride 109 94 - 109 mmol/L    Carbon Dioxide 27 20 - 32 mmol/L    Anion  Gap 6 3 - 14 mmol/L    Glucose 177 (H) 70 - 99 mg/dL    Urea Nitrogen 22 7 - 30 mg/dL    Creatinine 0.97 0.66 - 1.25 mg/dL    GFR Estimate 73 >60 mL/min/1.7m2    GFR Estimate If Black 88 >60 mL/min/1.7m2    Calcium 8.4 (L) 8.5 - 10.1 mg/dL    Bilirubin Total 0.6 0.2 - 1.3 mg/dL    Albumin 3.5 3.4 - 5.0 g/dL    Protein Total 6.9 6.8 - 8.8 g/dL    Alkaline Phosphatase 70 40 - 150 U/L    ALT 19 0 - 70 U/L    AST 16 0 - 45 U/L   CBC with platelets differential    Collection Time: 09/21/17  1:43 PM   Result Value Ref Range    WBC 9.7 4.0 - 11.0 10e9/L    RBC Count 4.55 4.4 - 5.9 10e12/L    Hemoglobin 13.7 13.3 - 17.7 g/dL    Hematocrit 40.5 40.0 - 53.0 %    MCV 89 78 - 100 fl    MCH 30.1 26.5 - 33.0 pg    MCHC 33.8 31.5 - 36.5 g/dL    RDW 15.4 (H) 10.0 - 15.0 %    Platelet Count 185 150 - 450 10e9/L    Diff Method Automated Method     % Neutrophils 82.4 %    % Lymphocytes 10.4 %    % Monocytes 6.3 %    % Eosinophils 0.4 %    % Basophils 0.3 %    % Immature Granulocytes 0.2 %    Nucleated RBCs 0 0 /100    Absolute Neutrophil 8.0 1.6 - 8.3 10e9/L    Absolute Lymphocytes 1.0 0.8 - 5.3 10e9/L    Absolute Monocytes 0.6 0.0 - 1.3 10e9/L    Absolute Eosinophils 0.0 0.0 - 0.7 10e9/L    Absolute Basophils 0.0 0.0 - 0.2 10e9/L    Abs Immature Granulocytes 0.0 0 - 0.4 10e9/L    Absolute Nucleated RBC 0.0

## 2017-09-21 NOTE — PROGRESS NOTES
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  He has had 3 treatments for the extra cutaneous CD30-positive disease, including -- 12/1998 (CHOP - good response), 2012 (relapse treated with CVP - mixed response), and then 8 cycles of brentuximab vedotin completed on 05/21/2013 . He had a good clinical response including in the nodes and skin with Bretuximab therapy .Until recently, he has had no evidence of recurrent dorothea disease since the brentuximab. However, in early 03/2017 he began to have fevers and sweats, and a 3-4 cm node in the left inguinal region and a cutaneous nodule above his left knee. An abdominal CT showed new enlarged lymph nodes in the left iliac and inguinofemoral regions. He was seen again on 03/30/2017 to review the results and obtain a FNA of a left groin node, which was positive for CD30+ lymphoma.He started Brentuximab on 5/5/2017. Cycle 1 was associated with rash. Cycle #2 was done on 6/1/2017 at which time he had EKG for shortness of breath. EKG revealed PVC's, but no other findings. Symptoms subsequently improved.    Interval History: Mr. Ashrafeceived his fifth cycle of brentuximab beginning 8/24/17. He tolerated his treatment very well. Denies fevers, chills, nausea, vomiting, edema, diarrhea or constipation. He has no significant neuropathy. No new lumps or bumps.    Has a cutaneous T cell lymphoma skin diagnosis. Skin changes a lot per the patient. He had a rash prior to treatment and then it went away. Skin is no worse than it was before starting treatment, may be slightly improved. Has not seen his dermatologist in some time.    Has not seen PCP in 1.5 years. Agrees to make appt to see him soon.    He has some trouble sleeping at night. Takes melatonin 5 mg every night to help with sleep. Sleeps 1-1.5 hours  "and then tossing the rest of the night.     His teeth have also been bothering him. He was supposed to have dental work done before his treatments began, but then postponed this work. He is wondering when he can return to see his dentist.     Has cut back on alcohol use, now only drinks wine at night. Has 8 oz wine per night. Used to drink bourbon and water. Quit smoking 30 years ago. Does have some SOB which he feels like is due to anxiety.    Does not take medications for depression or anxiety. Feels like his PCP did not help with this last time he saw him. Willing to go back and discuss this again.      REVIEW OF SYSTEMS:  Otherwise negative.        MEDICATIONS:   1.  Fluocinonide cream.   2.  Losartan.    3.  Acetaminophen p.r.n.   4.  Vitamin D.   5.  Aspirin 81 mg.   6.  Multivitamins.   7.  Finasteride.   8.  Lorazepam.   9.  Prochlorperazine (as needed with chemotherapy).   10.  Ibuprofen p.r.n.   11.  Hydrocortisone cream.   12.  Magnesium.   13.  Vitamin C.        ALLERGIES:  BEE VENOM, ATENOLOL.      Objective:  /65 (BP Location: Right arm, Patient Position: Chair, Cuff Size: Adult Regular)  Pulse 64  Temp 97.9  F (36.6  C) (Oral)  Ht 1.676 m (5' 5.98\")  Wt 67.4 kg (148 lb 9.6 oz)  SpO2 97%  BMI 24 kg/m2    General: Alert, hard of hearing, NAD  HEENT: Normocephalic, atraumatic. EOMI, sclera nonicteric. Tonsils not enlarged, posterior pharynx without erythema.  Neck: No LAD. Thyroid wnl.  Cardiac: RRR, normal S1/S2 without murmurs.  Lungs: CTAB, slightly prolonged expiratory phase.  Axillae: No nodes.  GI: Bowel sounds present, no hepatosplenomegaly or mass. Nontender. No inguinal nodes.  Extremities: No LE edema. Warm and well perfused. Radial pulses 2+.     Labs:  Hemoglobin 13.7 with 9,700 WBCs (normal differential) and 185,000 platelets.   Electrolytes, creatinine (0.97) - normal. Calcium just low at 8.4.  Glucose 177 (non-fasting).  Liver enzymes normal.     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  The patient has now received a total of 5 cycles of brentuximab, tolerating treatment very well.  He is due for cycle #6 today which will be his last cycle before CT imaging to assess overall disease status.  Clinical examination indicates a good response.      -6 weeks from now CT scan to establish post-treatment baseline  -Patient to make appt to see PCP in next 6 weeks  -Patient to make appt to see dermatologist   -OK to see dentist in 4 weeks (around Oct 19th) and have work done if needed  -Patient reminded to get influenza vaccine.    Mimi Gonzalez MD  [facility] PGY-1   P: [phone]    Oncology Attending    Patient seen and examined with the Medicine Resident, Dr. Gonzalez. Agree with her findings, assessment and plan.    Eddie Modi MD  Professor of Medicine  Oncology  [facility]  Office: [phone]  Clinic Fax: [phone]    CC:    MD Grey Sanchez MD

## 2017-09-21 NOTE — LETTER
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  He has had 3 treatments for the extra cutaneous CD30-positive disease, including -- 12/1998 (CHOP - good response), 2012 (relapse treated with CVP - mixed response), and then 8 cycles of brentuximab vedotin completed on 05/21/2013 . He had a good clinical response including in the nodes and skin with Bretuximab therapy .Until recently, he has had no evidence of recurrent dorothea disease since the brentuximab. However, in early 03/2017 he began to have fevers and sweats, and a 3-4 cm node in the left inguinal region and a cutaneous nodule above his left knee. An abdominal CT showed new enlarged lymph nodes in the left iliac and inguinofemoral regions. He was seen again on 03/30/2017 to review the results and obtain a FNA of a left groin node, which was positive for CD30+ lymphoma.He started Brentuximab on 5/5/2017. Cycle 1 was associated with rash. Cycle #2 was done on 6/1/2017 at which time he had EKG for shortness of breath. EKG revealed PVC's, but no other findings. Symptoms subsequently improved.    Interval History: Mr. Ashrafeceived his fifth cycle of brentuximab beginning 8/24/17. He tolerated his treatment very well. Denies fevers, chills, nausea, vomiting, edema, diarrhea or constipation. He has no significant neuropathy. No new lumps or bumps.    Has a cutaneous T cell lymphoma skin diagnosis. Skin changes a lot per the patient. He had a rash prior to treatment and then it went away. Skin is no worse than it was before starting treatment, may be slightly improved. Has not seen his dermatologist in some time.    Has not seen PCP in 1.5 years. Agrees to make appt to see him soon.    He has some  "trouble sleeping at night. Takes melatonin 5 mg every night to help with sleep. Sleeps 1-1.5 hours and then tossing the rest of the night.     His teeth have also been bothering him. He was supposed to have dental work done before his treatments began, but then postponed this work. He is wondering when he can return to see his dentist.     Has cut back on alcohol use, now only drinks wine at night. Has 8 oz wine per night. Used to drink bourbon and water. Quit smoking 30 years ago. Does have some SOB which he feels like is due to anxiety.    Does not take medications for depression or anxiety. Feels like his PCP did not help with this last time he saw him. Willing to go back and discuss this again.      REVIEW OF SYSTEMS:  Otherwise negative.        MEDICATIONS:   1.  Fluocinonide cream.   2.  Losartan.    3.  Acetaminophen p.r.n.   4.  Vitamin D.   5.  Aspirin 81 mg.   6.  Multivitamins.   7.  Finasteride.   8.  Lorazepam.   9.  Prochlorperazine (as needed with chemotherapy).   10.  Ibuprofen p.r.n.   11.  Hydrocortisone cream.   12.  Magnesium.   13.  Vitamin C.        ALLERGIES:  BEE VENOM, ATENOLOL.      Objective:  /65 (BP Location: Right arm, Patient Position: Chair, Cuff Size: Adult Regular)  Pulse 64  Temp 97.9  F (36.6  C) (Oral)  Ht 1.676 m (5' 5.98\")  Wt 67.4 kg (148 lb 9.6 oz)  SpO2 97%  BMI 24 kg/m2    General: Alert, hard of hearing, NAD  HEENT: Normocephalic, atraumatic. EOMI, sclera nonicteric. Tonsils not enlarged, posterior pharynx without erythema.  Neck: No LAD. Thyroid wnl.  Cardiac: RRR, normal S1/S2 without murmurs.  Lungs: CTAB, slightly prolonged expiratory phase.  Axillae: No nodes.  GI: Bowel sounds present, no hepatosplenomegaly or mass. Nontender. No inguinal nodes.  Extremities: No LE edema. Warm and well perfused. Radial pulses 2+.     Labs:  Hemoglobin 13.7 with 9,700 WBCs (normal differential) and 185,000 platelets.   Electrolytes, creatinine (0.97) - normal. Calcium just " low at 8.4.  Glucose 177 (non-fasting).  Liver enzymes normal.     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  The patient has now received a total of 5 cycles of brentuximab, tolerating treatment very well.  He is due for cycle #6 today which will be his last cycle before CT imaging to assess overall disease status.  Clinical examination indicates a good response.      -6 weeks from now CT scan to establish post-treatment baseline  -Patient to make appt to see PCP in next 6 weeks  -Patient to make appt to see dermatologist   -OK to see dentist in 4 weeks (around Oct 19th) and have work done if needed  -Patient reminded to get influenza vaccine.    Mimi Gonzalez MD  [facility] PGY-1   P: [phone]    Oncology Attending    Patient seen and examined with the Medicine Resident, Dr. Gonzalez. Agree with her findings, assessment and plan.    Eddie Modi MD  Professor of Medicine  Oncology  [facility]  Office: [phone]  Clinic Fax: [phone]    CC:    MD Grey Sanchez MD Bruce A. Peterson, MD

## 2017-09-21 NOTE — NURSING NOTE
Chief Complaint   Patient presents with     Blood Draw     labs drawn     /65 (BP Location: Right arm, Patient Position: Chair, Cuff Size: Adult Regular)  Pulse 64  Temp 97.9  F (36.6  C) (Oral)  Wt 67.4 kg (148 lb 9.6 oz)  SpO2 97%  BMI 24 kg/m2    Vitals taken.  PIV placed left forearm, Blood collected. Pt tolerated well. Pt checked in for next appointment.    Pearl Santoyo RN

## 2017-09-21 NOTE — MR AVS SNAPSHOT
After Visit Summary   9/21/2017    Bobby Bethea    MRN: [id number]           Patient Information     Date Of Birth          7/2/1929        Visit Information        Provider Department      9/21/2017 2:00 PM Eddie Modi MD [facility]        Today's Diagnoses     Extranodal lymphoma (H)    -  1       Follow-ups after your visit        Follow-up notes from your care team     Return in about 6 weeks (around 11/2/2017) for Lab Work, Physical Exam, CT or PET/CT.      Your next 10 appointments already scheduled     Nov 09, 2017 11:30 AM CST   [facility] Lab Draw with  [facility] DRAW   [facility])    [address] [place] [address]  2nd [facility] [id number] [phone]            Nov 09, 2017 12:00 PM CST   (Arrive by 11:45 AM)   CT CHEST/ABDOMEN/PELVIS W CONTRAST with UCCT2   [facility] [place] ([facility])    [address] [place] [id number] [phone]           Please bring any scans or X-rays taken at other hospitals, if similar tests were done. Also bring a list of your medicines, including vitamins, minerals and over-the-counter drugs. It is safest to leave personal items at home.  Be sure to tell your doctor:   If you have any allergies.   If there s any chance you are pregnant.   If you are breastfeeding.   If you have any special needs.  You may have contrast for this exam. To prepare:   Do not eat or drink for 2 hours before your exam. If you need to take medicine, you may take it with small sips of water. (We may ask you to take liquid medicine as well.)   The day before your exam, drink extra fluids at least six 8-ounce glasses (unless your doctor tells you to restrict your fluids).  Patients over 70 or patients with diabetes or kidney problems:   If you haven t had a blood test (creatinine test) within the last 30 days, go to your clinic or  Diagnostic Imaging Department for this test.  If you have diabetes:   If your kidney function is normal, continue taking your metformin (Avandamet, Glucophage, Glucovance, Metaglip) on the day of your exam.   If your kidney function is abnormal, wait 48 hours before restarting this medicine.  You will have oral contrast for this exam:   You will drink the contrast at home. Get this from your clinic or Diagnostic Imaging Department. Please follow the directions given.  Please wear loose clothing, such as a sweat suit or jogging clothes. Avoid snaps, zippers and other metal. We may ask you to undress and put on a hospital gown.  If you have any questions, please call the Imaging Department where you will have your exam.            Nov 09, 2017  1:00 PM CST   (Arrive by 12:45 PM)   Return Visit with Eddie Modi MD   [facility] ([facility])    [address] [address] [phone]              Who to contact     If you have questions or need follow up information about today's clinic visit or your schedule please contact [facility] directly at [phone].  Normal or non-critical lab and imaging results will be communicated to you by MyChart, letter or phone within 4 business days after the clinic has received the results. If you do not hear from us within 7 days, please contact the clinic through MyChart or phone. If you have a critical or abnormal lab result, we will notify you by phone as soon as possible.  Submit refill requests through [facility] or call your pharmacy and they will forward the refill request to us. Please allow 3 business days for your refill to be completed.          Additional Information About Your Visit        [facility] Information     [facility] lets you send messages to your doctor, view your test results, renew your prescriptions, schedule appointments and more. To sign up, go to  [email or web address] . Click on \"Log in\" on the left side of the screen, which will take you to the Welcome page. Then click on \"Sign up Now\" on the right side of the page.     You will be asked to enter the access code listed below, as well as some personal information. Please follow the directions to create your username and password.     Your access code is: [email or web address]  Expires: 2017  4:38 PM     Your access code will  in 90 days. If you need help or a new code, please call your [facility] clinic or [phone].        Care EveryWhere ID     This is your Care EveryWhere ID. This could be used by other organizations to access your [facility] medical records  [id number]        Your Vitals Were     Pulse Temperature Height Pulse Oximetry BMI (Body Mass Index)       64 97.9  F (36.6  C) (Oral) 1.676 m (5' 5.98\") 97% 24 kg/m2        Blood Pressure from Last 3 Encounters:   17 139/65   17 133/65   17 120/59    Weight from Last 3 Encounters:   17 67.4 kg (148 lb 9.6 oz)   17 66.5 kg (146 lb 9.6 oz)   17 67.6 kg (149 lb)                 Today's Medication Changes          These changes are accurate as of: 17 11:59 PM.  If you have any questions, ask your nurse or doctor.               Stop taking these medicines if you haven't already. Please contact your care team if you have questions.     Vitamin C 500 MG Caps   Stopped by:  Eddie Modi MD                    Primary Care Provider Office Phone # Fax #    [facility] [phone]       [address] [place] [address]        Equal Access to Services     TRAE OMALLEY : Edilberto Lee, issac muller, isabel martinez. So [facility] [phone].    ATENCIÓN: Si habla español, tiene a frost disposición servicios gratuitos de asistencia lingüística. Llame al [phone].    We comply with applicable federal civil rights " laws and [place] laws. We do not discriminate on the basis of race, color, national origin, age, disability sex, sexual orientation or gender identity.            Thank you!     Thank you for choosing [facility]  for your care. Our goal is always to provide you with excellent care. Hearing back from our patients is one way we can continue to improve our services. Please take a few minutes to complete the written survey that you may receive in the mail after your visit with us. Thank you!             Your Updated Medication List - Protect others around you: Learn how to safely use, store and throw away your medicines at www.disposemymeds.org.          This list is accurate as of: 9/21/17 11:59 PM.  Always use your most recent med list.                   Brand Name Dispense Instructions for use Diagnosis    aspirin 81 MG tablet      Take 1 tablet by mouth daily.        DAILY MULTIVITAMIN PO      Take  by mouth daily.        fluocinonide 0.05 % cream    LIDEX    120 g    Apply topically 2 times daily Apply topically twice daily as needed for itchy rash on body.    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 Mix with vanicream    Cutaneous lymphoma (H)       ibuprofen 600 MG tablet    ADVIL/MOTRIN     Take 600 mg by mouth        LORazepam 0.5 MG tablet    ATIVAN    30 tablet    Take 1 tablet (0.5 mg) by mouth every 4 hours as needed (Anxiety, Nausea/Vomiting or Sleep)    Extranodal lymphoma (H)       losartan 25 MG tablet    COZAAR    30 tablet    Take 1 tablet by mouth daily.    Unspecified essential hypertension       magnesium 250 MG tablet      Take 1 tablet by mouth daily.        MELATONIN PO      Take 5 mg by mouth At Bedtime    Extranodal lymphoma (H)       prochlorperazine 5 MG tablet    COMPAZINE    30 tablet    Take 1 tablet (5 mg) by mouth every 6 hours as needed (Nausea/Vomiting)     Extranodal lymphoma (H)       PROSCAR 5 MG tablet   Generic drug:  finasteride      Take 5 mg by mouth daily.        TYLENOL PO      Take 325 mg by mouth daily as needed        vitamin D 1000 UNITS capsule      Take 1 capsule by mouth daily.

## 2017-09-27 NOTE — TELEPHONE ENCOUNTER
----- Message from Martha Jenkins sent at 9/27/2017 11:09 AM CDT -----  Regarding: pt's oncologist, Dr. Eddie Modi at [facility] insists that pt see Dr. Portillo ....  Contact: [phone]  Pt needs to be checked (t-cell return) with [facility].  Pt is coming back to [facility] on 11/9/17 when he sees Dr. Modi again.  Could pt see Dr. ADDISON around 10am on that day?      Please call pt at [phone].    Thank you,  Parish LAMB    Please DO NOT send this message and/or reply back to sender.  Call Center Representatives DO NOT respond to messages.

## 2017-11-06 NOTE — PATIENT INSTRUCTIONS

## 2017-11-06 NOTE — NURSING NOTE
Lidocaine 1 % injection   1.5mL once for one use, starting 11/6/2017 ending 11/6/2017,  2mL disp, R-0, injection  Injected by Dr. Zepeda

## 2017-11-06 NOTE — MR AVS SNAPSHOT
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  A skin biopsy allows the doctor to examine a very small piece of tissue under the microscope to determine the diagnosis and the best treatment for the skin condition. A local anesthetic (numbing medicine)  is injected with a very small needle into the skin area to be tested. A small piece of skin is taken from the area. Sometimes a suture (stitch) is used.     What are the risks of a skin biopsy?  I will experience scar, bleeding, swelling, pain, crusting and redness. I may experience incomplete removal or recurrence. Risks of this procedure are excessive bleeding, bruising, infection, nerve damage, numbness, thick (hypertrophic or keloidal) scar and non-diagnostic biopsy.    How should I care for my wound for the first 24 hours?    Keep the wound dry and covered for 24 hours    If it bleeds, hold direct pressure on the area for 15 minutes. If bleeding does not stop then go to the emergency room    Avoid strenuous exercise the first 1-2 days or as your doctor instructs you    How should I care for the wound after 24 hours?    After 24 hours, remove the bandage    You may bathe or shower as normal    If you had a scalp biopsy, you can shampoo as usual and can use shower water to clean the biopsy site daily    Clean the wound twice a day with gentle soap and water    Do not scrub, be gentle    Apply white petroleum/Vaseline after cleaning the wound with a cotton swab or a clean finger, and keep the site covered with a  Bandaid /bandage. Bandages are not necessary with a scalp biopsy    If you are unable to cover the site with a Bandaid /bandage, re-apply ointment 2-3 times a day to keep the site moist. Moisture will help with healing    Avoid strenuous activity for first 1-2 days    Avoid lakes, rivers, pools, and oceans until the stitches are removed or the site is healed    How do I clean my wound?    Wash hands thoroughly with soap or use hand  before all wound care    Clean the wound with gentle soap and water    Apply white petroleum/Vaseline  to wound after it is clean    Replace the Bandaid /bandage to keep the wound covered for the first few days or as instructed by your doctor    If you had a scalp biopsy, warm shower water to the area on a daily basis should suffice    What should I use to clean my wound?     Cotton-tipped applicators (Qtips )    White petroleum jelly (Vaseline ). Use a clean new container and use Q-tips to apply.    Bandaids   as needed    Gentle soap     How should I care for my wound long term?    Do not get your wound dirty    Keep up with wound care for one week or until the area is healed.    A small scab will form and fall off by itself when the area is completely healed. The area will be red and will become pink in color as it heals. Sun protection is very important for how your scar will turn out. Sunscreen with an SPF 30 or greater is recommended once the area is healed.    If you have stitches, stitches need to be removed in 14 days. You may return to our clinic for this or you may have it done locally at your doctor s office.    You should have some soreness but it should be mild and slowly go away over several days. Talk to your doctor about using tylenol for pain,    When should I call my doctor?  If you have increased:     Pain or swelling    Pus or drainage (clear or slightly yellow drainage is ok)    Temperature over 100F    Spreading redness or warmth around wound    When will  I hear about my results?  The biopsy results can take 2-3 weeks to come back. The clinic will call you with the results, send you a mycGlobeInt message, or have you schedule a follow-up clinic or phone time to discuss the results. Contact our clinics if you do not hear from us in 3 weeks.     Who should I call with questions?    [facility]: [phone]     [facility]: [phone]    For urgent needs outside of business hours call the [facility] at [phone] and ask for the dermatology resident on call              Follow-ups after your visit        Your next 10 appointments already scheduled     Nov 09, 2017 11:30 AM CST   [facility] Draw with  [facility]   [facility] ([facility])    [address] [place] [id number] [phone]            Nov 09, 2017 12:00 PM CST   (Arrive by 11:45 AM)   CT CHEST/ABDOMEN/PELVIS W CONTRAST with UCCT2   [facility] ([facility])    [address] [place] [id number] [phone]           Please bring any scans or X-rays taken at other hospitals, if similar tests were done. Also bring a list of your medicines, including vitamins, minerals and over-the-counter drugs. It is safest to leave personal items at home.  Be sure to tell your doctor:   If you have any allergies.   If there s any chance you are pregnant.   If you are breastfeeding.   If you have any special needs.  You may have contrast for this exam. To prepare:   Do not eat or drink for 2 hours before your exam. If you need to take medicine, you may take it with small sips of water. (We may ask you to take liquid medicine as well.)   The day before your exam, drink extra fluids at least six 8-ounce glasses (unless your doctor tells you to restrict your fluids).  Patients over 70 or patients with diabetes or kidney  problems:   If you haven t had a blood test (creatinine test) within the last 30 days, go to your clinic or Diagnostic Imaging Department for this test.  If you have diabetes:   If your kidney function is normal, continue taking your metformin (Avandamet, Glucophage, Glucovance, Metaglip) on the day of your exam.   If your kidney function is abnormal, wait 48 hours before restarting this medicine.  You will have oral contrast for this exam:   You will drink the contrast at home. Get this from your clinic or Diagnostic Imaging Department. Please follow the directions given.  Please wear loose clothing, such as a sweat suit or jogging clothes. Avoid snaps, zippers and other metal. We may ask you to undress and put on a hospital gown.  If you have any questions, please call the Imaging Department where you will have your exam.            Nov 09, 2017  1:00 PM CST   (Arrive by 12:45 PM)   Return Visit with Eddie Modi MD   [facility] ([facility])    [address] [place] [id number] [phone]            Nov 20, 2017  2:15 PM CST   (Arrive by 2:00 PM)   Return T-Cell Visit with Martha Portillo MD   [facility] Dermatology ([facility])    [address] [place] [id number] [phone]              Who to contact     Please call your clinic at [phone] to:    Ask questions about your health    Make or cancel appointments    Discuss your medicines    Learn about your test results    Speak to your doctor   If you have compliments or concerns about an experience at your clinic, or if you wish to file a complaint, please contact [facility] Patient Relations at [phone] or email us at [email or web address]         Additional Information About Your Visit        Care EveryWhere ID     This is your Care EveryWhere ID. This could be used by other  organizations to access your [facility] medical records  [id number]         Blood Pressure from Last 3 Encounters:   09/21/17 139/65   08/24/17 133/65   07/27/17 120/59    Weight from Last 3 Encounters:   09/21/17 67.4 kg (148 lb 9.6 oz)   08/24/17 66.5 kg (146 lb 9.6 oz)   07/27/17 67.6 kg (149 lb)              We Performed the Following     BIOPSY SKIN/SUBQ/MUC MEM, SINGLE LESION     Surgical pathology exam          Today's Medication Changes          These changes are accurate as of: 11/6/17  1:48 PM.  If you have any questions, ask your nurse or doctor.               Start taking these medicines.        Dose/Directions    bupivacaine-lidocaine-EPINEPHErine 0.33%-0.33%-1:300,000 Soln injection   Used for:  Neoplasm of uncertain behavior of skin   Started by:  Martha Portillo MD        Dose:  3 mL   Inject 3 mLs as directed once for 1 dose   Quantity:  3 mL   Refills:  0            Where to get your medicines      These medications were sent to [id number] - [place], [place] - [address], [place] [address]     Phone:  [phone]     fluocinonide 0.05 % cream         Some of these will need a paper prescription and others can be bought over the counter.  Ask your nurse if you have questions.     You don't need a prescription for these medications     bupivacaine-lidocaine-EPINEPHErine 0.33%-0.33%-1:300,000 Soln injection                Primary Care Provider Office Phone # Fax #    [facility] [place] [facility] [phone]       [address] [place] [address]        Equal Access to Services     TRAE OMALLEY AH: Hadnaomi Lee, wahusam muller, qashane kaalisabel rosario. So [facility] [phone].    ATENCIÓN: Si habla español, tiene a frost disposición servicios gratuitos de asistencia lingüística. Chyna gomez [phone].    We comply with applicable federal civil rights laws and [place] laws. We do  not discriminate on the basis of race, color, national origin, age, disability, sex, sexual orientation, or gender identity.            Thank you!     Thank you for choosing [facility] DERMATOLOGY  for your care. Our goal is always to provide you with excellent care. Hearing back from our patients is one way we can continue to improve our services. Please take a few minutes to complete the written survey that you may receive in the mail after your visit with us. Thank you!             Your Updated Medication List - Protect others around you: Learn how to safely use, store and throw away your medicines at www.disposemymeds.org.          This list is accurate as of: 11/6/17  1:48 PM.  Always use your most recent med list.                   Brand Name Dispense Instructions for use Diagnosis    aspirin 81 MG tablet      Take 1 tablet by mouth daily.        bupivacaine-lidocaine-EPINEPHErine 0.33%-0.33%-1:300,000 Soln injection     3 mL    Inject 3 mLs as directed once for 1 dose    Neoplasm of uncertain behavior of skin       DAILY MULTIVITAMIN PO      Take  by mouth daily.        fluocinonide 0.05 % cream    LIDEX    120 g    Apply topically 2 times daily Apply topically twice daily as needed for itchy rash on body.    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 Mix with vanicream    Cutaneous lymphoma (H)       ibuprofen 600 MG tablet    ADVIL/MOTRIN     Take 600 mg by mouth        LORazepam 0.5 MG tablet    ATIVAN    30 tablet    Take 1 tablet (0.5 mg) by mouth every 4 hours as needed (Anxiety, Nausea/Vomiting or Sleep)    Extranodal lymphoma (H)       losartan 25 MG tablet    COZAAR    30 tablet    Take 1 tablet by mouth daily.    Unspecified essential hypertension       magnesium 250 MG tablet      Take 1 tablet by mouth daily.        MELATONIN PO      Take 5 mg by mouth At Bedtime    Extranodal lymphoma (H)        prochlorperazine 5 MG tablet    COMPAZINE    30 tablet    Take 1 tablet (5 mg) by mouth every 6 hours as needed (Nausea/Vomiting)    Extranodal lymphoma (H)       PROSCAR 5 MG tablet   Generic drug:  finasteride      Take 5 mg by mouth daily.        TYLENOL PO      Take 325 mg by mouth daily as needed        vitamin D 1000 UNITS capsule      Take 1 capsule by mouth daily.

## 2017-11-06 NOTE — PROGRESS NOTES
"[facility] Dermatology Note      Dermatology Problem List:  1. Cutaneous T-Cell Lymphoma, patch and plaque stage. Tx: hydrocortisone/Vanicream. Lidex 0.05% ointment.   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   2. SKs  3. AKs.     Encounter Date: Nov 6, 2017    CC:   Chief Complaint   Patient presents with     Derm Problem     Bobby is here today for a T cell skin check.  States his skin is becoming swollen, and has a rash and \"pimples\" forming in his groin area.         History of Present Illness:  Mr. Bobby Bethea is a 88 year old male who presents as a follow-up for CTCL. The patient was last seen 8/22/16 when he was started on lidex 0.05% ointment. He has since been treated with 6 cycles of Bretuximab with Heme/Onc. He notes when he called to schedule his appointment his skin was the best it had been in a long time. He notes since this time his skin has worsened significantly. He notes itching and indurated plaque on his left cheek. He notes his back and his groin is the most bothered by itching and rash. Finished his last round of chemo Sept. 21st. He is due for labs and a CT scan on Thursday. He has not been using any topical steroids for his skin lesions as he did not feel they were helpful previously.     He has otherwise been feeling well. Denies other concerns today.     Past Medical History:   Patient Active Problem List   Diagnosis     Extranodal lymphoma (H)     Hypertension     Fever of undetermined origin     Drug-induced neutropenia (H)     Malignant neoplasm (H)     Advance care planning     Past Medical History:   Diagnosis Date     Hypertension      Malignant neoplasm (H)     lymphoma Dx in August. and 13 years ago.      Past Surgical History:   Procedure Laterality Date     HERNIA REPAIR Bilateral        Social History:  The patient is retired.     Family History:  There is no family history of skin " cancer.    Medications:  Current Outpatient Prescriptions   Medication Sig Dispense Refill     MELATONIN PO Take 5 mg by mouth At Bedtime       LORazepam (ATIVAN) 0.5 MG tablet Take 1 tablet (0.5 mg) by mouth every 4 hours as needed (Anxiety, Nausea/Vomiting or Sleep) 30 tablet 2     prochlorperazine (COMPAZINE) 5 MG tablet Take 1 tablet (5 mg) by mouth every 6 hours as needed (Nausea/Vomiting) 30 tablet 1     ibuprofen (ADVIL/MOTRIN) 600 MG tablet Take 600 mg by mouth       fluocinonide (LIDEX) 0.05 % cream Apply topically 2 times daily Apply topically twice daily as needed for itchy rash on body. 120 g 5     hydrocortisone 2.5 % cream Apply  topically 2 times daily. Mix with vanicream 454 g 3     losartan (COZAAR) 25 MG tablet Take 1 tablet by mouth daily. 30 tablet 3     Acetaminophen (TYLENOL PO) Take 325 mg by mouth daily as needed        magnesium 250 MG tablet Take 1 tablet by mouth daily.       Cholecalciferol (VITAMIN D) 1000 UNITS capsule Take 1 capsule by mouth daily.       aspirin 81 MG tablet Take 1 tablet by mouth daily.       Multiple Vitamin (DAILY MULTIVITAMIN PO) Take  by mouth daily.       finasteride (PROSCAR) 5 MG tablet Take 5 mg by mouth daily.          Allergies   Allergen Reactions     Bee Venom Anaphylaxis     Atenolol Other (See Comments)     Bradycardia       No Clinical Screening - See Comments      PN: LW Other1: -Beestings         Review of Systems:  -Constitutional: The patient is otherwise feeling well  -Skin: As above in HPI. No additional skin concerns.    Physical exam:  Vitals: There were no vitals taken for this visit.  GEN: This is a well developed, well-nourished male in no acute distress, in a pleasant mood.    SKIN: Full skin, which includes the head/face, both arms, chest, back, abdomen,both legs, genitalia and/or groin buttocks, digits and/or nails, was examined.  - there is an indurated ~ 4 cm violaceous to medium pink plaque on the left cheek, with faint overlying  desquamation  - there are many medium pink erythematous papules and plaques, many with overlying hemorrhagic crusting clustered over the upper back, mons pubis, bilateral hips  - No other lesions of concern on areas examined.     Impression/Plan:  1. Cutaneous T-Cell Lymphoma. Concern for progression/transformation. We will obtain biopsy today. We will have him continue his topical steroid in the meantime for symptoms.     Continue lidex 0.05% cream applied once or twice daily  Punch biopsy:  After discussion of benefits and risks including but not limited to bleeding/bruising, pain/swelling, infection, scar, incomplete removal, nerve damage/numbness, recurrence, and non-diagnostic biopsy, written consent, verbal consent and photographs were obtained. Time-out was performed. The area was cleaned with isopropyl alcohol. 1mL of 1% lidocaine with epinephrine was injected to obtain adequate anesthesia of the lesion on the left cheek. A 4 mm punch biopsy was performed.  4-0 prolene sutures were utilized to approximate the epidermal edges.  White petroleum jelly/VaselineTM and a bandage was applied to the wound.  Explicit verbal and written wound care instructions were provided.  The patient left the Dermatology Clinic in good condition. The patient was counseled to follow up for suture removal in approximately 7-10 days.  Will await biopsy results prior to further determination of treatment strategy       Follow-up in 2 weeks  Dr. Martha Portillo MD staffed the patient.    Staff Involved:  Ekaterina Zepeda MD  PGY-3, Dermatology  [facility]    I was present for key portions of the procedure. JOSE J GAMBOA, Martha Portillo MD, saw this patient with the resident and agree with the resident s findings and plan of care as documented in the resident s note.

## 2017-11-06 NOTE — LETTER
"11/6/2017       RE: Bobby Bethea  [address] [facility] [facility] [address]     Dear Colleague,    Thank you for referring your patient, Bobby Bethea, to the [facility] DERMATOLOGY at [facility]. Please see a copy of my visit note below.    [facility] Dermatology Note      Dermatology Problem List:  1. Cutaneous T-Cell Lymphoma, patch and plaque stage. Tx: hydrocortisone/Vanicream. Lidex 0.05% ointment.   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   2. SKs  3. AKs.     Encounter Date: Nov 6, 2017    CC:   Chief Complaint   Patient presents with     Derm Problem     Bobby is here today for a T cell skin check.  States his skin is becoming swollen, and has a rash and \"pimples\" forming in his groin area.         History of Present Illness:  Mr. Bobby Bethea is a 88 year old male who presents as a follow-up for CTCL. The patient was last seen 8/22/16 when he was started on lidex 0.05% ointment. He has since been treated with 6 cycles of Bretuximab with Heme/Onc. He notes when he called to schedule his appointment his skin was the best it had been in a long time. He notes since this time his skin has worsened significantly. He notes itching and indurated plaque on his left cheek. He notes his back and his groin is the most bothered by itching and rash. Finished his last round of chemo Sept. 21st. He is due for labs and a CT scan on Thursday. He has not been using any topical steroids for his skin lesions as he did not feel they were helpful previously.     He has otherwise been feeling well. Denies other concerns today.     Past Medical History:   Patient Active Problem List   Diagnosis     Extranodal lymphoma (H)     Hypertension     Fever of undetermined origin     Drug-induced neutropenia (H)     Malignant neoplasm (H)     Advance care planning     Past Medical History:   Diagnosis Date "     Hypertension      Malignant neoplasm (H)     lymphoma Dx in August. and 13 years ago.      Past Surgical History:   Procedure Laterality Date     HERNIA REPAIR Bilateral        Social History:  The patient is retired.     Family History:  There is no family history of skin cancer.    Medications:  Current Outpatient Prescriptions   Medication Sig Dispense Refill     MELATONIN PO Take 5 mg by mouth At Bedtime       LORazepam (ATIVAN) 0.5 MG tablet Take 1 tablet (0.5 mg) by mouth every 4 hours as needed (Anxiety, Nausea/Vomiting or Sleep) 30 tablet 2     prochlorperazine (COMPAZINE) 5 MG tablet Take 1 tablet (5 mg) by mouth every 6 hours as needed (Nausea/Vomiting) 30 tablet 1     ibuprofen (ADVIL/MOTRIN) 600 MG tablet Take 600 mg by mouth       fluocinonide (LIDEX) 0.05 % cream Apply topically 2 times daily Apply topically twice daily as needed for itchy rash on body. 120 g 5     hydrocortisone 2.5 % cream Apply  topically 2 times daily. Mix with vanicream 454 g 3     losartan (COZAAR) 25 MG tablet Take 1 tablet by mouth daily. 30 tablet 3     Acetaminophen (TYLENOL PO) Take 325 mg by mouth daily as needed        magnesium 250 MG tablet Take 1 tablet by mouth daily.       Cholecalciferol (VITAMIN D) 1000 UNITS capsule Take 1 capsule by mouth daily.       aspirin 81 MG tablet Take 1 tablet by mouth daily.       Multiple Vitamin (DAILY MULTIVITAMIN PO) Take  by mouth daily.       finasteride (PROSCAR) 5 MG tablet Take 5 mg by mouth daily.          Allergies   Allergen Reactions     Bee Venom Anaphylaxis     Atenolol Other (See Comments)     Bradycardia       No Clinical Screening - See Comments      PN: LW Other1: -Beestings         Review of Systems:  -Constitutional: The patient is otherwise feeling well  -Skin: As above in HPI. No additional skin concerns.    Physical exam:  Vitals: There were no vitals taken for this visit.  GEN: This is a well developed, well-nourished male in no acute distress, in a pleasant  mood.    SKIN: Full skin, which includes the head/face, both arms, chest, back, abdomen,both legs, genitalia and/or groin buttocks, digits and/or nails, was examined.  - there is an indurated ~ 4 cm violaceous to medium pink plaque on the left cheek, with faint overlying desquamation  - there are many medium pink erythematous papules and plaques, many with overlying hemorrhagic crusting clustered over the upper back, mons pubis, bilateral hips  - No other lesions of concern on areas examined.     Impression/Plan:  1. Cutaneous T-Cell Lymphoma. Concern for progression/transformation. We will obtain biopsy today. We will have him continue his topical steroid in the meantime for symptoms.     Continue lidex 0.05% cream applied once or twice daily  Punch biopsy:  After discussion of benefits and risks including but not limited to bleeding/bruising, pain/swelling, infection, scar, incomplete removal, nerve damage/numbness, recurrence, and non-diagnostic biopsy, written consent, verbal consent and photographs were obtained. Time-out was performed. The area was cleaned with isopropyl alcohol. 1mL of 1% lidocaine with epinephrine was injected to obtain adequate anesthesia of the lesion on the left cheek. A 4 mm punch biopsy was performed.  4-0 prolene sutures were utilized to approximate the epidermal edges.  White petroleum jelly/VaselineTM and a bandage was applied to the wound.  Explicit verbal and written wound care instructions were provided.  The patient left the Dermatology Clinic in good condition. The patient was counseled to follow up for suture removal in approximately 7-10 days.  Will await biopsy results prior to further determination of treatment strategy       Follow-up in 2 weeks  Dr. Martha Portillo MD staffed the patient.    Staff Involved:  Ekaterina Zepeda MD  PGY-3, Dermatology  [facility]    I was present for key portions of the procedure. JOSE J GAMBOA, Martha Portillo MD, saw this patient  with the resident and agree with the resident s findings and plan of care as documented in the resident s note.            Pictures were placed in Pt's chart today for future reference.

## 2017-11-06 NOTE — NURSING NOTE
"Dermatology Rooming Note    Bobby Lake's goals for this visit include:   Chief Complaint   Patient presents with     Derm Problem     Bobby is here today for a T cell skin check.  States his skin is becoming swollen, and has a rash and \"pimples\" forming in his groin area.       Jeniffer Gonzales, BRENNEN  "

## 2017-11-09 NOTE — NURSING NOTE
"Oncology Rooming Note    November 9, 2017 12:56 PM   Bobby Bethea is a 88 year old male who presents for:    Chief Complaint   Patient presents with     Blood Draw     Labs drawn by RN from T. VS taken.      Oncology Clinic Visit     Return visit related to Lymphoma     Initial Vitals: /79  Pulse 95  Temp 98.5  F (36.9  C) (Oral)  Resp 16  Ht 1.676 m (5' 5.98\")  Wt 67.6 kg (149 lb 1.6 oz)  SpO2 98%  BMI 24.08 kg/m2 Estimated body mass index is 24.08 kg/(m^2) as calculated from the following:    Height as of this encounter: 1.676 m (5' 5.98\").    Weight as of this encounter: 67.6 kg (149 lb 1.6 oz). Body surface area is 1.77 meters squared.  No Pain (0) Comment: Data Unavailable   No LMP for male patient.  Allergies reviewed: Yes  Medications reviewed: Yes    Medications: Medication refills not needed today.  Pharmacy name entered into [facility]: [id number] [address]    Clinical concerns: No new concerns Patient states he got a rash on the groin form venlafaxine.. Provider was notified.    10 minutes for nursing intake (face to face time)     Gina Barfield LPN            "

## 2017-11-09 NOTE — MR AVS SNAPSHOT
"              After Visit Summary   11/9/2017    Bobby Bethea    MRN: [id number]           Patient Information     Date Of Birth          7/2/1929        Visit Information        Provider Department      11/9/2017 1:00 PM Eddie Modi MD [facility]        Today's Diagnoses     Extranodal lymphoma (H)           Follow-ups after your visit        Follow-up notes from your care team     Return will arrange.      Your next 10 appointments already scheduled     Nov 20, 2017  2:15 PM CST   (Arrive by 2:00 PM)   Return T-Cell Visit with Martha Portillo MD   [facility] ([facility] [place])    [address]  3rd Floor  [facility] [address] [phone]              Who to contact     If you have questions or need follow up information about today's clinic visit or your schedule please contact [facility] directly at [phone].  Normal or non-critical lab and imaging results will be communicated to you by MyChart, letter or phone within 4 business days after the clinic has received the results. If you do not hear from us within 7 days, please contact the clinic through MyChart or phone. If you have a critical or abnormal lab result, we will notify you by phone as soon as possible.  Submit refill requests through [employer] or call your pharmacy and they will forward the refill request to us. Please allow 3 business days for your refill to be completed.          Additional Information About Your Visit        Care EveryWhere ID     This is your Care EveryWhere ID. This could be used by other organizations to access your [facility] medical records  [id number]        Your Vitals Were     Pulse Temperature Respirations Height Pulse Oximetry BMI (Body Mass Index)    95 98.5  F (36.9  C) (Oral) 16 1.676 m (5' 5.98\") 98% 24.08 kg/m2       Blood Pressure from Last 3 Encounters:   11/09/17 159/79   09/21/17 139/65   08/24/17 133/65    " Weight from Last 3 Encounters:   11/09/17 67.6 kg (149 lb 1.6 oz)   09/21/17 67.4 kg (148 lb 9.6 oz)   08/24/17 66.5 kg (146 lb 9.6 oz)              We Performed the Following     *CBC with platelets differential     Comprehensive metabolic panel     Lactate Dehydrogenase     Protein electrophoresis     Uric acid        Primary Care Provider Office Phone # Fax #    [facility] [phone]       [address] [facility]        Equal Access to Services     TRAE OMALLEY : Hadii aad ku hadasho Soomaali, waaxda luqadaha, qaybta kaalmada adeegyada, waxay idiin hayaan adejami abdi . So [facility] [phone].    ATENCIÓN: Si habla español, tiene a frost disposición servicios gratuitos de asistencia lingüística. Llame al [phone].    We comply with applicable federal civil rights laws and [place] laws. We do not discriminate on the basis of race, color, national origin, age, disability, sex, sexual orientation, or gender identity.            Thank you!     Thank you for choosing [facility]  for your care. Our goal is always to provide you with excellent care. Hearing back from our patients is one way we can continue to improve our services. Please take a few minutes to complete the written survey that you may receive in the mail after your visit with us. Thank you!             Your Updated Medication List - Protect others around you: Learn how to safely use, store and throw away your medicines at www.disposemymeds.org.          This list is accurate as of: 11/9/17 11:59 PM.  Always use your most recent med list.                   Brand Name Dispense Instructions for use Diagnosis    aspirin 81 MG tablet      Take 1 tablet by mouth daily.        DAILY MULTIVITAMIN PO      Take  by mouth daily.        fluocinonide 0.05 % cream    LIDEX    120 g    Apply topically 2 times daily Apply topically twice daily as needed for itchy rash on body.    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 Mix with vanicream    Cutaneous lymphoma (H)       ibuprofen 600 MG tablet    ADVIL/MOTRIN     Take 600 mg by mouth        LORazepam 0.5 MG tablet    ATIVAN    30 tablet    Take 1 tablet (0.5 mg) by mouth every 4 hours as needed (Anxiety, Nausea/Vomiting or Sleep)    Extranodal lymphoma (H)       losartan 25 MG tablet    COZAAR    30 tablet    Take 1 tablet by mouth daily.    Unspecified essential hypertension       magnesium 250 MG tablet      Take 1 tablet by mouth daily.        MELATONIN PO      Take 5 mg by mouth At Bedtime    Extranodal lymphoma (H)       prochlorperazine 5 MG tablet    COMPAZINE    30 tablet    Take 1 tablet (5 mg) by mouth every 6 hours as needed (Nausea/Vomiting)    Extranodal lymphoma (H)       PROSCAR 5 MG tablet   Generic drug:  finasteride      Take 5 mg by mouth daily.        TYLENOL PO      Take 325 mg by mouth daily as needed        venlafaxine 37.5 MG 24 hr capsule    EFFEXOR-XR     1 tab daily for 5 days then increase to 2 tabs daily        vitamin D 1000 UNITS capsule      Take 1 capsule by mouth daily.

## 2017-11-09 NOTE — PROGRESS NOTES
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  He has had 3 treatments for the extra cutaneous CD30-positive disease, including -- 12/1998 (CHOP - good response), 2012 (relapse treated with CVP - mixed response), and then 8 cycles of brentuximab vedotin completed on 05/21/2013 . He had a good clinical response including in the nodes and skin with Bretuximab therapy .Until recently, he has had no evidence of recurrent dorothea disease since the brentuximab. However, in early 03/2017 he began to have fevers and sweats, and a 3-4 cm node in the left inguinal region and a cutaneous nodule above his left knee. An abdominal CT showed new enlarged lymph nodes in the left iliac and inguinofemoral regions. He was seen again on 03/30/2017 to review the results and obtain a FNA of a left groin node, which was positive for CD30+ lymphoma. He started Brentuximab on 5/5/2017. Cycle #1 was associated with rash. Cycle #2 was done on 6/1/2017 at which time he had EKG for shortness of breath. EKG revealed PVC's, but no other findings. Symptoms subsequently improved. He received his 6th cycle on 09/21/2017.     INTERVAL HISTORY:  Mr. Bethea was last in my clinic on 09/21/2017.  Again, he tolerated the treatment very well and returns today with a CT scan to more fully assess his response.      Mr. Bethea indicates that his skin was \"great\" at the last visit.  However, things have changed.  He relates the following scenario.  He saw his primary physician, Dr. Guerrero, on 10/26/2017.  Pulmonary functions were obtained and he was also started on venlafaxine for anxiety, sleep, etc.  He took his first dose on 10/31 and then continued for approximately 4 days.  However, he noted no improvement and discontinued the medication on 11/03 because of perceived lack of benefitt as well as new skin changes.  He is not " certain whether the skin changes (rash) anteceded the medication, but thinks that they did not.      The skin has since become quite problematic for him and he was seen on 11/06 in Dermatology by Dr. Portillo.  A biopsy from a left cheek lesion was obtained and is pending.      The patient notes that the skin changes occurred on the left cheek, but also small papules on his trunk, posterior and anterior, as well as on his proximal extremities.  However, the largest areas of abnormality are in his bilateral inguinal areas and other areas such as the left intercostal area, mid abdomen, posterior neck, and pruritic lesions on his scalp.  Some of these are papular.  Some appear to have scabbing with dark dried blood.  None has been vesicular.  These have not been accompanied by a fever or other changes.  He has now been off the venlafaxine for 6 days without signs of regression up to this point.      Otherwise, the patient states that he has been doing well with his usual problems of anxiety, inactivity, depression.      No known interval infections.  No other new medication started than venlafaxine.  No known exposures.      MEDICATIONS:     1.  Melatonin.   2.  Ibuprofen.   3.  Losartan.   4.  Acetaminophen.   5.  Magnesium.   6.  Vitamin D.     7.  Aspirin 81 mg.   8.  Multivitamins.   9.  Finasteride.   10.  Fluocinonide cream.   11.  Lorazepam.   12.  prochlorperazine.    (Not taking either of the latter 3 medications).      ALLERGIES:  BEE VENOM, ATENOLOL.      PHYSICAL EXAMINATION:   VITAL SIGNS:  Weight 67.6 kg (stable), blood pressure 159/79, pulse 95 and regular, respirations 16, temperature 98.5.  O2 saturation 98% on room air.   HEENT:  Anicteric.  No conjunctival lesions.  Oropharynx has no masses.  Mucosa moist and without lesion.   NECK:  No cervical or supraclavicular adenopathy.   CHEST:  Clear to auscultation.   ABDOMEN:  Soft and nontender.  No organomegaly.   EXTREMITIES:  No edema.   SKIN:   Approximately 5 cm slightly purplish and indurated plaque on his left cheek.  Some scaling.  A Band-Aid covers the recent biopsy site.  Over his trunk, in general and scalp are scattered small papules, slightly erythematous.  On the posterior neck, left intercostal region, infraumbilical area, bilateral groin and right hip area are extensive aggregates of multiple papules and plaques on an erythematous base.  Within the inguinal areas some of them have hemorrhagic crusting.  Some of the individual papules are 4-6 mm in diameter, most are 2-3 mm.      LABORATORY DATA:      Hemoglobin 14.3 g% with 7900 WBCs (normal differential) and 196,000 platelets.   Electrolytes, calcium, creatinine (0.98) are normal.  Uric acid 3.8.  Glucose 123 (nonfasting).   Liver enzymes, including serum LDH, are normal.     Skin biopsy - pending.     IMAGING:  Chest and abdominal CT scan:     1. Significant interval decrease in the previously noted enlarged left iliac chain lymph nodes as compared to 3/20/2017 exam, findings consistent with treatment response. No evidence of enlarged lymph nodes in the chest, abdomen and pelvis to suggest residual disease.  2. Few scattered stable pulmonary nodules, for example 4 mm left lower lobe pulmonary nodule, not significantly changed as compared to 2/4/2004 exam.  3. Significantly enlarged prostate gland, not significantly changed as compared to 3/22/2017 exam.      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  There is no evidence of recurrent disease, unless the current skin eruptions or lesion on the left cheek reflect an alternative manifestation of this disease.      New rash and skin lesions: Mr. Bethea will stay off the venlafaxine for now.  Dermatologic side effects are relatively rare.  However, the  findings on his trunk, extremities and inguinal regions are unusual. Will await biopsy result and communicate with Dr. Portillo about next steps.      I spent in excess of 40 minutes with Mr. Bethea, the majority of time in counseling.     Eddie Modi MD  Professor of Medicine  Oncology  [facility]  Office: [phone]  Clinic Fax: [phone]       cc:      MD Grey Sanchez MD

## 2017-11-09 NOTE — NURSING NOTE
Chief Complaint   Patient presents with     Blood Draw     Labs drawn by RN from VPT. VS taken.      KENNEDY SANTOS RN

## 2017-11-09 NOTE — LETTER
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  He has had 3 treatments for the extra cutaneous CD30-positive disease, including -- 12/1998 (CHOP - good response), 2012 (relapse treated with CVP - mixed response), and then 8 cycles of brentuximab vedotin completed on 05/21/2013 . He had a good clinical response including in the nodes and skin with Bretuximab therapy .Until recently, he has had no evidence of recurrent dorothea disease since the brentuximab. However, in early 03/2017 he began to have fevers and sweats, and a 3-4 cm node in the left inguinal region and a cutaneous nodule above his left knee. An abdominal CT showed new enlarged lymph nodes in the left iliac and inguinofemoral regions. He was seen again on 03/30/2017 to review the results and obtain a FNA of a left groin node, which was positive for CD30+ lymphoma. He started Brentuximab on 5/5/2017. Cycle #1 was associated with rash. Cycle #2 was done on 6/1/2017 at which time he had EKG for shortness of breath. EKG revealed PVC's, but no other findings. Symptoms subsequently improved. He received his 6th cycle on 09/21/2017.     INTERVAL HISTORY:  Mr. Bethea was last in my clinic on 09/21/2017.  Again, he tolerated the treatment very well and returns today with a CT scan to more fully assess his response.      Mr. Bethea indicates that his skin was \"great\" at the last visit.  However, things have changed.  He relates the following scenario.  He saw his primary physician, Dr. Guerrero, on 10/26/2017.  Pulmonary functions were obtained and he was also started on venlafaxine for anxiety, sleep, etc.  He took his first dose on 10/31 and then continued for approximately 4 days.  However, he noted no improvement and discontinued the " medication on 11/03 because of perceived lack of benefitt as well as new skin changes.  He is not certain whether the skin changes (rash) anteceded the medication, but thinks that they did not.      The skin has since become quite problematic for him and he was seen on 11/06 in Dermatology by Dr. Portillo.  A biopsy from a left cheek lesion was obtained and is pending.      The patient notes that the skin changes occurred on the left cheek, but also small papules on his trunk, posterior and anterior, as well as on his proximal extremities.  However, the largest areas of abnormality are in his bilateral inguinal areas and other areas such as the left intercostal area, mid abdomen, posterior neck, and pruritic lesions on his scalp.  Some of these are papular.  Some appear to have scabbing with dark dried blood.  None has been vesicular.  These have not been accompanied by a fever or other changes.  He has now been off the venlafaxine for 6 days without signs of regression up to this point.      Otherwise, the patient states that he has been doing well with his usual problems of anxiety, inactivity, depression.      No known interval infections.  No other new medication started than venlafaxine.  No known exposures.      MEDICATIONS:     1.  Melatonin.   2.  Ibuprofen.   3.  Losartan.   4.  Acetaminophen.   5.  Magnesium.   6.  Vitamin D.     7.  Aspirin 81 mg.   8.  Multivitamins.   9.  Finasteride.   10.  Fluocinonide cream.   11.  Lorazepam.   12.  prochlorperazine.    (Not taking either of the latter 3 medications).      ALLERGIES:  BEE VENOM, ATENOLOL.      PHYSICAL EXAMINATION:   VITAL SIGNS:  Weight 67.6 kg (stable), blood pressure 159/79, pulse 95 and regular, respirations 16, temperature 98.5.  O2 saturation 98% on room air.   HEENT:  Anicteric.  No conjunctival lesions.  Oropharynx has no masses.  Mucosa moist and without lesion.   NECK:  No cervical or supraclavicular adenopathy.   CHEST:  Clear to  auscultation.   ABDOMEN:  Soft and nontender.  No organomegaly.   EXTREMITIES:  No edema.   SKIN:  Approximately 5 cm slightly purplish and indurated plaque on his left cheek.  Some scaling.  A Band-Aid covers the recent biopsy site.  Over his trunk, in general and scalp are scattered small papules, slightly erythematous.  On the posterior neck, left intercostal region, infraumbilical area, bilateral groin and right hip area are extensive aggregates of multiple papules and plaques on an erythematous base.  Within the inguinal areas some of them have hemorrhagic crusting.  Some of the individual papules are 4-6 mm in diameter, most are 2-3 mm.      LABORATORY DATA:      Hemoglobin 14.3 g% with 7900 WBCs (normal differential) and 196,000 platelets.   Electrolytes, calcium, creatinine (0.98) are normal.  Uric acid 3.8.  Glucose 123 (nonfasting).   Liver enzymes, including serum LDH, are normal.     Skin biopsy - pending.     IMAGING:  Chest and abdominal CT scan:     1. Significant interval decrease in the previously noted enlarged left iliac chain lymph nodes as compared to 3/20/2017 exam, findings consistent with treatment response. No evidence of enlarged lymph nodes in the chest, abdomen and pelvis to suggest residual disease.  2. Few scattered stable pulmonary nodules, for example 4 mm left lower lobe pulmonary nodule, not significantly changed as compared to 2/4/2004 exam.  3. Significantly enlarged prostate gland, not significantly changed as compared to 3/22/2017 exam.      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  There is no evidence of recurrent disease, unless the current skin eruptions or lesion on the left cheek reflect an alternative manifestation of this disease.      New rash and skin lesions: Mr. Bethea will  stay off the venlafaxine for now.  Dermatologic side effects are relatively rare.  However, the findings on his trunk, extremities and inguinal regions are unusual. Will await biopsy result and communicate with Dr. Portillo about next steps.      I spent in excess of 40 minutes with Mr. Bethea, the majority of time in counseling.     Edide Modi MD  Professor of Medicine  Oncology  [facility]  Office: [phone]  Clinic Fax: [phone]       cc:      MD Grey Sanchez MD Bruce A. Peterson, MD

## 2017-11-10 NOTE — PROGRESS NOTES
Called patient this morning per the request of Dr. Modi to inform him that his CT scan showed no evidence of lymphoma and that his labs looked good as well.  Patient was very pleased to hear this and grateful for the follow up call. Also informed patient that we will be communicating with him when the results of the skin biopsy that Dr. Portillo did are back.  Patient verbalized understanding of plan and again was grateful for the update and call.

## 2017-11-20 NOTE — MR AVS SNAPSHOT
After Visit Summary   11/20/2017    Bobby Bethea    MRN: [id number]           Patient Information     Date Of Birth          7/2/1929        Visit Information        Provider Department      11/20/2017 2:15 PM Martha Portillo MD [facility] Dermatology        Care Instructions    Use the fluocinonide ointment to the sore spots once daily.  OK to cover with gauze.          Follow-ups after your visit        Who to contact     Please call your clinic at [phone] to:    Ask questions about your health    Make or cancel appointments    Discuss your medicines    Learn about your test results    Speak to your doctor   If you have compliments or concerns about an experience at your clinic, or if you wish to file a complaint, please contact [facility] Patient Relations at [phone] or email us at [email or web address]         Additional Information About Your Visit        Care EveryWhere ID     This is your Care EveryWhere ID. This could be used by other organizations to access your [place] medical records  [id number]        Your Vitals Were     Pulse                   79            Blood Pressure from Last 3 Encounters:   11/20/17 124/70   11/09/17 159/79   09/21/17 139/65    Weight from Last 3 Encounters:   11/09/17 67.6 kg (149 lb 1.6 oz)   09/21/17 67.4 kg (148 lb 9.6 oz)   08/24/17 66.5 kg (146 lb 9.6 oz)              Today, you had the following     No orders found for display       Primary Care Provider Office Phone # Fax #    [facility] [phone]       [address] [place] [address]        Equal Access to Services     TRAE OMALLEY : Hadii aad ku hadasho Soomaali, waaxda luqadaha, qaybta kaalmada adeegyamarlene, isabel abdi . So [facility] [phone].    ATENCIÓN: Si habla español, tiene a frost disposición servicios gratuitos de asistencia lingüística. Llame al [phone].    We comply with  applicable federal civil rights laws and [place] laws. We do not discriminate on the basis of race, color, national origin, age, disability, sex, sexual orientation, or gender identity.            Thank you!     Thank you for choosing [facility] DERMATOLOGY  for your care. Our goal is always to provide you with excellent care. Hearing back from our patients is one way we can continue to improve our services. Please take a few minutes to complete the written survey that you may receive in the mail after your visit with us. Thank you!             Your Updated Medication List - Protect others around you: Learn how to safely use, store and throw away your medicines at www.disposemymeds.org.          This list is accurate as of: 11/20/17  2:19 PM.  Always use your most recent med list.                   Brand Name Dispense Instructions for use Diagnosis    aspirin 81 MG tablet      Take 1 tablet by mouth daily.        DAILY MULTIVITAMIN PO      Take  by mouth daily.        fluocinonide 0.05 % cream    LIDEX    120 g    Apply topically 2 times daily Apply topically twice daily as needed for itchy rash on body.    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 Mix with vanicream    Cutaneous lymphoma (H)       ibuprofen 600 MG tablet    ADVIL/MOTRIN     Take 600 mg by mouth        LORazepam 0.5 MG tablet    ATIVAN    30 tablet    Take 1 tablet (0.5 mg) by mouth every 4 hours as needed (Anxiety, Nausea/Vomiting or Sleep)    Extranodal lymphoma (H)       losartan 25 MG tablet    COZAAR    30 tablet    Take 1 tablet by mouth daily.    Unspecified essential hypertension       magnesium 250 MG tablet      Take 1 tablet by mouth daily.        MELATONIN PO      Take 5 mg by mouth At Bedtime    Extranodal lymphoma (H)       prochlorperazine 5 MG tablet    COMPAZINE    30 tablet    Take 1 tablet (5 mg) by mouth every 6 hours as  needed (Nausea/Vomiting)    Extranodal lymphoma (H)       PROSCAR 5 MG tablet   Generic drug:  finasteride      Take 5 mg by mouth daily.        TYLENOL PO      Take 325 mg by mouth daily as needed        venlafaxine 37.5 MG 24 hr capsule    EFFEXOR-XR     1 tab daily for 5 days then increase to 2 tabs daily        vitamin D 1000 UNITS capsule      Take 1 capsule by mouth daily.

## 2017-11-20 NOTE — NURSING NOTE
Dermatology Rooming Note    Bobby Bethea's goals for this visit include:   Chief Complaint   Patient presents with     Derm Problem     Bobby is here today for a biopsy follow up and also a suture removal.      YARI Charles

## 2017-11-20 NOTE — LETTER
11/20/2017       RE: Bobby Bethea  [address] [address]     Dear Colleague,    Thank you for referring your patient, Bobby Bethea, to the [facility] DERMATOLOGY at [facility]. Please see a copy of my visit note below.    [facility] Dermatology Note        Dermatology Problem List:  1. Cutaneous T-Cell Lymphoma, patch and plaque stage. Tx: hydrocortisone/Vanicream. Lidex 0.05% ointment.   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   2. SKs  3. AKs.      Encounter Date:  Nov 20 ,2017     CC:        Chief Complaint   Patient presents with     Follow up biopsy                  History of Present Illness:  Mr. Bobby Bethea is a 88 year old male who presents as a follow-up for CTCL. The patient was last seen 11/6/2017 when he had a biopsy of the left cheek.  The area has healed well and he returns for suture removal.   H e continues on lidex 0.05% ointment but doesn't know if it helps. He has since been treated with 6 cycles of Bretuximab with Heme/Onc. He reports little scab areas which he picks off on other areas of the body and has ongoing itching.         PPhysical exam:  Vitals: There were no vitals taken for this visit.  GEN: This is a well developed, well-nourished male in no acute distress, in a pleasant mood.    SKIN: Focused exam of face, trunk and upper extremities.  His left cheek plaque is well healed and actually is decreased in size compared to last visit.  He has scattered patches and plaques of face, neck, trunk, arms and hands.  Several with pinpoint crusts that the patient is removing during exam.      Impression/Plan:  1. Cutaneous T-Cell Lymphoma. Concern for progression/transformation. We will have him continue his topical steroid in the meantime for symptoms. Unfortunately the biopsy report is still pending.  Once we get it back, I will review  with Dr Eddie Modi in Oncology and then contact the patient with any new plan.    Continue lidex 0.05% cream applied once or twice daily    Will await biopsy results prior to further determination of treatment strategy        Sincerely,    Martha Portillo MD

## 2017-11-20 NOTE — PROGRESS NOTES
[facility] Dermatology Note        Dermatology Problem List:  1. Cutaneous T-Cell Lymphoma, patch and plaque stage. Tx: hydrocortisone/Vanicream. Lidex 0.05% ointment.   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   2. SKs  3. AKs.      Encounter Date:  Nov 20 ,2017     CC:        Chief Complaint   Patient presents with     Follow up biopsy                  History of Present Illness:  Mr. Bobby Bethea is a 88 year old male who presents as a follow-up for CTCL. The patient was last seen 11/6/2017 when he had a biopsy of the left cheek.  The area has healed well and he returns for suture removal.   He continues on lidex 0.05% ointment but doesn't know if it helps. He has since been treated with 6 cycles of Bretuximab with Heme/Onc. He reports little scab areas which he picks off on other areas of the body and has ongoing itching.         PPhysical exam:  Vitals: There were no vitals taken for this visit.  GEN: This is a well developed, well-nourished male in no acute distress, in a pleasant mood.    SKIN: Focused exam of face, trunk and upper extremities.  His left cheek plaque is well healed and actually is decreased in size compared to last visit.  He has scattered patches and plaques of face, neck, trunk, arms and hands.  Several with pinpoint crusts that the patient is removing during exam.      Impression/Plan:  1. Cutaneous T-Cell Lymphoma. Concern for progression/transformation. We will have him continue his topical steroid in the meantime for symptoms. Unfortunately the biopsy report is still pending.  Once we get it back, I will review with Dr Eddie Modi in Oncology and then contact the patient with any new plan.    Continue lidex 0.05% cream applied once or twice daily    Will await biopsy results prior to further determination of treatment strategy

## 2018-01-01 ENCOUNTER — TELEPHONE (OUTPATIENT)
Dept: SPIRITUAL SERVICES | Facility: CLINIC | Age: 83
End: 2018-01-01

## 2018-01-01 ENCOUNTER — ONCOLOGY VISIT (OUTPATIENT)
Dept: ONCOLOGY | Facility: CLINIC | Age: 83
End: 2018-01-01
Attending: INTERNAL MEDICINE
Payer: COMMERCIAL

## 2018-01-01 VITALS
WEIGHT: 151.5 LBS | BODY MASS INDEX: 24.46 KG/M2 | TEMPERATURE: 97.9 F | DIASTOLIC BLOOD PRESSURE: 80 MMHG | RESPIRATION RATE: 16 BRPM | OXYGEN SATURATION: 98 % | HEART RATE: 91 BPM | SYSTOLIC BLOOD PRESSURE: 173 MMHG

## 2018-01-01 DIAGNOSIS — C85.99 EXTRANODAL LYMPHOMA (H): Primary | ICD-10-CM

## 2018-01-01 DIAGNOSIS — C84.A0 CUTANEOUS T-CELL LYMPHOMA, UNSPECIFIED BODY REGION (H): ICD-10-CM

## 2018-01-01 DIAGNOSIS — F41.9 ANXIETY: ICD-10-CM

## 2018-01-01 PROCEDURE — 99215 OFFICE O/P EST HI 40 MIN: CPT | Mod: ZP | Performed by: INTERNAL MEDICINE

## 2018-01-01 PROCEDURE — G0463 HOSPITAL OUTPT CLINIC VISIT: HCPCS | Mod: ZF

## 2018-01-01 ASSESSMENT — PAIN SCALES - GENERAL: PAINLEVEL: MILD PAIN (3)

## 2018-02-08 NOTE — MR AVS SNAPSHOT
"              After Visit Summary   2018    Bobby Bethea    MRN: [id number]           Patient Information     Date Of Birth          1929        Visit Information        Provider Department      2018 1:30 PM Eddie Modi MD [facility]        Today's Diagnoses     Extranodal lymphoma (H)    -  1    Cutaneous T-cell lymphoma, unspecified body region (H)        Anxiety           Follow-ups after your visit        Follow-up notes from your care team     Return for Physical Exam.      Who to contact     If you have questions or need follow up information about today's clinic visit or your schedule please contact [facility] directly at [phone].  Normal or non-critical lab and imaging results will be communicated to you by ROCKETHOMEhart, letter or phone within 4 business days after the clinic has received the results. If you do not hear from us within 7 days, please contact the clinic through MyChart or phone. If you have a critical or abnormal lab result, we will notify you by phone as soon as possible.  Submit refill requests through [employer] or call your pharmacy and they will forward the refill request to us. Please allow 3 business days for your refill to be completed.          Additional Information About Your Visit        MyChart Information     [employer] lets you send messages to your doctor, view your test results, renew your prescriptions, schedule appointments and more. To sign up, go to [email or web address] . Click on \"Log in\" on the left side of the screen, which will take you to the Welcome page. Then click on \"Sign up Now\" on the right side of the page.     You will be asked to enter the access code listed below, as well as some personal information. Please follow the directions to create your username and password.     Your access code is: [email or web address]  Expires: 2018 10:17 AM     Your access code will  in 90 days. If you need help or a " new code, please call your [place] clinic or [phone].        Care EveryWhere ID     This is your Care EveryWhere ID. This could be used by other organizations to access your [place] medical records  [id number]        Your Vitals Were     Pulse Temperature Respirations Pulse Oximetry BMI (Body Mass Index)       91 97.9  F (36.6  C) (Tympanic) 16 98% 24.46 kg/m2        Blood Pressure from Last 3 Encounters:   02/08/18 173/80   11/20/17 124/70   11/09/17 159/79    Weight from Last 3 Encounters:   02/08/18 68.7 kg (151 lb 8 oz)   11/09/17 67.6 kg (149 lb 1.6 oz)   09/21/17 67.4 kg (148 lb 9.6 oz)              Today, you had the following     No orders found for display       Primary Care Provider Office Phone # Fax #    [facility] [phone]       [address] [facility]        Equal Access to Services     TRAE OMALLEY : Hadii aad ku hadasho Soomaali, waaxda luqadaha, qaybta kaalmada adeegyada, waxay idiin haydillonn derik abdi . So [facility] [phone].    ATENCIÓN: Si habla español, tiene a frost disposición servicios gratuitos de asistencia lingüística. Cecilame al [phone].    We comply with applicable federal civil rights laws and [place] laws. We do not discriminate on the basis of race, color, national origin, age, disability, sex, sexual orientation, or gender identity.            Thank you!     Thank you for choosing [facility]  for your care. Our goal is always to provide you with excellent care. Hearing back from our patients is one way we can continue to improve our services. Please take a few minutes to complete the written survey that you may receive in the mail after your visit with us. Thank you!             Your Updated Medication List - Protect others around you: Learn how to safely use, store and throw away your medicines at www.disposemymeds.org.          This list is accurate as of 2/8/18 11:59 PM.  Always use your most  recent med list.                   Brand Name Dispense Instructions for use Diagnosis    aspirin 81 MG tablet      Take 1 tablet by mouth daily.        DAILY MULTIVITAMIN PO      Take  by mouth daily.        fluocinonide 0.05 % cream    LIDEX    120 g    Apply topically 2 times daily Apply topically twice daily as needed for itchy rash on body.    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 Mix with vanicream    Cutaneous lymphoma (H)       ibuprofen 600 MG tablet    ADVIL/MOTRIN     Take 600 mg by mouth        LORazepam 0.5 MG tablet    ATIVAN    30 tablet    Take 1 tablet (0.5 mg) by mouth every 4 hours as needed (Anxiety, Nausea/Vomiting or Sleep)    Extranodal lymphoma (H)       losartan 25 MG tablet    COZAAR    30 tablet    Take 1 tablet by mouth daily.    Unspecified essential hypertension       magnesium 250 MG tablet      Take 1 tablet by mouth daily.        MELATONIN PO      Take 5 mg by mouth At Bedtime    Extranodal lymphoma (H)       prochlorperazine 5 MG tablet    COMPAZINE    30 tablet    Take 1 tablet (5 mg) by mouth every 6 hours as needed (Nausea/Vomiting)    Extranodal lymphoma (H)       PROSCAR 5 MG tablet   Generic drug:  finasteride      Take 5 mg by mouth daily.        TYLENOL PO      Take 325 mg by mouth daily as needed        venlafaxine 37.5 MG 24 hr capsule    EFFEXOR-XR     1 tab daily for 5 days then increase to 2 tabs daily        vitamin D 1000 UNITS capsule      Take 1 capsule by mouth daily.

## 2018-02-08 NOTE — LETTER
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  He has had 3 treatments for the extracutaneous CD30-positive disease, including -- 12/1998 ([facility] - good response), 2012 (relapse treated with CVP - mixed response), and then 8 cycles of brentuximab vedotin completed on 05/21/2013 . He had a good clinical response including in the nodes and skin with Bretuximab therapy. He then had no evidence of recurrent dorothea disease until in early 03/2017 when he began to have fevers and sweats, and a 3-4 cm node in the left inguinal region and a cutaneous nodule above his left knee. An abdominal CT showed new enlarged lymph nodes in the left iliac and inguinofemoral regions. A FNA of a left groin node was positive for CD30+ lymphoma. He started Brentuximab on 5/5/2017. Cycle #1 was associated with rash. Cycle #2 was done on 6/1/2017 at which time he had EKG for shortness of breath. EKG revealed PVC's, but no other findings. Symptoms subsequently improved. He received his 6th cycle on 09/21/2017 and had a good response by CT scan on 11/09/2017.      Mr. Bethea was started on venlafaxine by his primary physician, Dr. Guerrero, on 10/26/2017 for chronic and severe anxiety, sleep disturbance, etc.  He took the medication for a few days, noted no improvement and discontinued the medication because of perceived lack of benefitt as well as new skin changes.  He  was not certain whether the skin changes (rash) anteceded the medication, but thinks that they did not. The skin became quite problematic and a biopsy of a large left cheek lesion was obtained on 11/06 by Dr. Portillo.     INTERVAL HISTORY:  The patient was last in my clinic on 11/09/2017.  He has not followed up in Dermatology as scheduled.  His skin biopsy from  the left cheek lesion showed an atypical lymphoid infiltrate, but a diagnosis could not be reached. T-cell gene rearrangements were unsuccessful due to limited tissue.       Mr. Bethea has continued to experience considerable anxiety and fear.  As previously noted, he was started on venlafaxine and discontinued it without consulting his primary physician, Dr. Guerrero.  After his last visit and the biopsy of the left cheek lesion, the lesion spontaneously regressed and has resolved.  Other skin areas are not especially problematic.  He has a new sensation of feeling wet in the perirectal and scrotal region.  However,he notes no findings of moisture or incontinence.        Mr. Bethea continues to focus on his anxiety, fear and depression.  He has vacillated on whether to restart the venlafaxine, but has not.  Also, he has not resolved the issue of whether counseling would be something he would embrace.      He has had no interval infections, fevers or night sweats.  He has no new adenopathy.  As noted, his skin is relatively quiescent.       MEDICATIONS:  Reviewed and unchanged, although the patient has not been taking venlafaxine or frequently using the fluocinonide cream.      ALLERGIES:  BEE VENOM, ATENOLOL.       PHYSICAL EXAMINATION:   VITAL SIGNS:  Weight 68.7 kg (stable), blood pressure 173/80, pulse 91 and regular, respirations 16, temperature 97.9.  O2 saturation 98% on room air.   GENERAL:  The patient is anxious, loquacious, and seemed reassured over the course of the visit.   HEENT:  No conjunctival injection.  Anicteric.  Oropharynx - no mass.  Mucosa - moist and without plaque or ulceration.   NECK:  No cervical/supraclavicular nodes.   CHEST:  Clear to auscultation.   AXILLAE:  No nodes.   ABDOMEN:  Soft.  Nontender.  Bowel sounds active.  No detectable organomegaly or mass.  No significant inguinal nodes.   EXTREMITIES:  No lower extremity edema.   SKIN:  The purplish, indurated plaque over  the left cheek has totally disappeared with no apparent residual abnormality.  The incision site is healed.  There are very few small papules scattered over his trunk.  Over the right posterolateral thorax is an irregular patch of mild erythema.  There is no scaling or other abnormality.  In the right suprapubic region are 3 small, hyperpigmented lesions, nonpalpable.  There are similar lesions on the right flank.  Perianal and testicular region is without evident lesions with the exception of 2 small, dry papules on the right medial buttock.       LABORATORY:  None obtained.      Skin biopsy - Left cheek: Atypical lymphoid infiltrate -    COMMENT: The specimen exhibits an infiltrate of medium-sized and occasionally large lymphoid cells with admixed eosinophils and histiocytes, accentuated perifollicularly. This pattern somewhat resembles lymphomatoid papulosis (LyP), or even an arthopod bite reaction with activated lymphocytes, though clinical photos demonstrate a nodular plaque which seems incompatible with a diagnosis of LyP or arthropod bite.  Thus these findings are suspected to represent persistence or recurrence of the patient's known large cell lymphoma.     Unfortunately, interpretation of this specimen is limited by embedding artifact; the majority of the atypical cells are present only in a small tissue fragment, and largely disappeared on level sections. Immunostains were attempted, including CD3, CD20, CD30 and CD68, though these fail to demonstrate an atypical CD30-positive population (it is   also possible that the patient's lymphoma is now [id number] after brentuximab therapy).  T cell gene rearrangement has been ordered for further evaluation.        T-cell receptor gene rearrangement - QNS.       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  We will continue to monitor the patient at   3-4-month intervals. I spent over 40 minutes with Mr. Bethea, the majority of time in counseling.     Skin appears to be well controlled at present.  The lesion over his left cheek has resolved without specific intervention, suggesting that it may have been reactive or inflammatory and not related to his lymphoma. Biopsy does not exclude the possibility of lymphoma and continued followup is important.  Other sites of disease also appear to be relatively minor at this time.      Anxiety/depression - This is Mr. Bethea' major complaint at present.  He recognizes it and wonders if he should go back on the venlafaxine.  I indicated that I would recommend he first contact Dr. Guerrero's office for guidance, but from my perspective would seem reasonable.  Also, consider whether counseling might be helpful for this individual with major psychological discomfort.        Return in 3-4 months.  He indicates that he will contact us for the appointment.  Laboratory studies are not planned but may be added at the visit.       Eddie Modi MD  Professor of Medicine  Oncology  [facility]  Office: [phone]  Clinic Fax: [phone]         cc:    MD Grey Sanchez MD Bruce A. Peterson, MD

## 2018-02-08 NOTE — NURSING NOTE
"Oncology Rooming Note    February 8, 2018 1:08 PM   Bobby Bethea is a 88 year old male who presents for:    No chief complaint on file.    Initial Vitals: /80  Pulse 91  Temp 97.9  F (36.6  C) (Tympanic)  Resp 16  Wt 68.7 kg (151 lb 8 oz)  SpO2 98%  BMI 24.46 kg/m2 Estimated body mass index is 24.46 kg/(m^2) as calculated from the following:    Height as of 11/9/17: 1.676 m (5' 5.98\").    Weight as of this encounter: 68.7 kg (151 lb 8 oz). Body surface area is 1.79 meters squared.  Mild Pain (3) Comment: Right Arm   No LMP for male patient.  Allergies reviewed: Yes  Medications reviewed: Yes    Medications: Medication refills not needed today.  Pharmacy name entered into [facility]: [id number] [address]    Clinical concerns: Patient stated he got a rash after taking Venlafaxine after the 5th dose. Provider was notified.    10 minutes for nursing intake (face to face time)     Gina Barfield LPN              "

## 2018-02-08 NOTE — PROGRESS NOTES
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  He has had 3 treatments for the extracutaneous CD30-positive disease, including -- 12/1998 ([facility] - good response), 2012 (relapse treated with CVP - mixed response), and then 8 cycles of brentuximab vedotin completed on 05/21/2013 . He had a good clinical response including in the nodes and skin with Bretuximab therapy. He then had no evidence of recurrent dorothea disease until in early 03/2017 when he began to have fevers and sweats, and a 3-4 cm node in the left inguinal region and a cutaneous nodule above his left knee. An abdominal CT showed new enlarged lymph nodes in the left iliac and inguinofemoral regions. A FNA of a left groin node was positive for CD30+ lymphoma. He started Brentuximab on 5/5/2017. Cycle #1 was associated with rash. Cycle #2 was done on 6/1/2017 at which time he had EKG for shortness of breath. EKG revealed PVC's, but no other findings. Symptoms subsequently improved. He received his 6th cycle on 09/21/2017 and had a good response by CT scan on 11/09/2017.      Mr. Bethea was started on venlafaxine by his primary physician, Dr. Guerrero, on 10/26/2017 for chronic and severe anxiety, sleep disturbance, etc.  He took the medication for a few days, noted no improvement and discontinued the medication because of perceived lack of benefitt as well as new skin changes.  He was not certain whether the skin changes (rash) anteceded the medication, but thinks that they did not. The skin became quite problematic and a biopsy of a large left cheek lesion was obtained on 11/06 by Dr. Portillo.     INTERVAL HISTORY:  The patient was last in my clinic on 11/09/2017.  He has not followed up in Dermatology as scheduled.  His skin biopsy from the left cheek lesion showed an atypical lymphoid infiltrate, but a diagnosis could not be  reached. T-cell gene rearrangements were unsuccessful due to limited tissue.       Mr. Bethea has continued to experience considerable anxiety and fear.  As previously noted, he was started on venlafaxine and discontinued it without consulting his primary physician, Dr. Guerrero.  After his last visit and the biopsy of the left cheek lesion, the lesion spontaneously regressed and has resolved.  Other skin areas are not especially problematic.  He has a new sensation of feeling wet in the perirectal and scrotal region.  However,he notes no findings of moisture or incontinence.        Mr. Bethea continues to focus on his anxiety, fear and depression.  He has vacillated on whether to restart the venlafaxine, but has not.  Also, he has not resolved the issue of whether counseling would be something he would embrace.      He has had no interval infections, fevers or night sweats.  He has no new adenopathy.  As noted, his skin is relatively quiescent.       MEDICATIONS:  Reviewed and unchanged, although the patient has not been taking venlafaxine or frequently using the fluocinonide cream.      ALLERGIES:  BEE VENOM, ATENOLOL.       PHYSICAL EXAMINATION:   VITAL SIGNS:  Weight 68.7 kg (stable), blood pressure 173/80, pulse 91 and regular, respirations 16, temperature 97.9.  O2 saturation 98% on room air.   GENERAL:  The patient is anxious, loquacious, and seemed reassured over the course of the visit.   HEENT:  No conjunctival injection.  Anicteric.  Oropharynx - no mass.  Mucosa - moist and without plaque or ulceration.   NECK:  No cervical/supraclavicular nodes.   CHEST:  Clear to auscultation.   AXILLAE:  No nodes.   ABDOMEN:  Soft.  Nontender.  Bowel sounds active.  No detectable organomegaly or mass.  No significant inguinal nodes.   EXTREMITIES:  No lower extremity edema.   SKIN:  The purplish, indurated plaque over the left cheek has totally disappeared with no apparent residual abnormality.  The incision  site is healed.  There are very few small papules scattered over his trunk.  Over the right posterolateral thorax is an irregular patch of mild erythema.  There is no scaling or other abnormality.  In the right suprapubic region are 3 small, hyperpigmented lesions, nonpalpable.  There are similar lesions on the right flank.  Perianal and testicular region is without evident lesions with the exception of 2 small, dry papules on the right medial buttock.       LABORATORY:  None obtained.      Skin biopsy - Left cheek: Atypical lymphoid infiltrate -    COMMENT: The specimen exhibits an infiltrate of medium-sized and occasionally large lymphoid cells with admixed eosinophils and histiocytes, accentuated perifollicularly. This pattern somewhat resembles lymphomatoid papulosis (LyP), or even an arthopod bite reaction with activated lymphocytes, though clinical photos demonstrate a nodular plaque which seems incompatible with a diagnosis of LyP or arthropod bite.  Thus these findings are suspected to represent persistence or recurrence of the patient's known large cell lymphoma.     Unfortunately, interpretation of this specimen is limited by embedding artifact; the majority of the atypical cells are present only in a small tissue fragment, and largely disappeared on level sections. Immunostains were attempted, including CD3, CD20, CD30 and CD68, though these fail to demonstrate an atypical CD30-positive population (it is   also possible that the patient's lymphoma is now [id number] after brentuximab therapy).  T cell gene rearrangement has been ordered for further evaluation.        T-cell receptor gene rearrangement - QNS.       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  We will continue to monitor the patient at  3-4-month intervals. I spent over 40 minutes with Mr. Bethea, the majority of time in  counseling.     Skin appears to be well controlled at present.  The lesion over his left cheek has resolved without specific intervention, suggesting that it may have been reactive or inflammatory and not related to his lymphoma. Biopsy does not exclude the possibility of lymphoma and continued followup is important.  Other sites of disease also appear to be relatively minor at this time.      Anxiety/depression - This is Mr. Bethea' major complaint at present.  He recognizes it and wonders if he should go back on the venlafaxine.  I indicated that I would recommend he first contact Dr. Guerrero's office for guidance, but from my perspective would seem reasonable.  Also, consider whether counseling might be helpful for this individual with major psychological discomfort.        Return in 3-4 months.  He indicates that he will contact us for the appointment.  Laboratory studies are not planned but may be added at the visit.       Eddie Modi MD  Professor of Medicine  Oncology  [facility]  Office: [phone]  Clinic Fax: [phone]         cc:    MD Grey Sanchez MD

## 2018-02-13 NOTE — TELEPHONE ENCOUNTER
"SPIRITUAL HEALTH SERVICES CONTACT WITH PATIENT IN RESPONSE TO ONCOLOGY DISTRESS SCREENING    Reason for call: I initiated a call in response to  Bobby's recent request for a call back from Spiritual Health Services.    Intervention: Reviewed documentation prior to call. I was able to speak with Bobby directly. He wondered if he had given his physician special concern to merit my contacting him. I explained the role of the , [facility]'s commitment to support patients to use spiritual and emotional resources as part of the healing process, and his response to the oncology distress screen that led me to call him. Bobby said that he is a member of [employer] \"even though I don't go there\" and said he is satisfied with the support he does have. I informed him of the availability of a  in the clinic or over the phone in case he (and/or his wife) might find talking to someone who could appreciate his raquel beliefs and assist him to maximize their impact as he seeks healing. Bobby reported that he hates to leave the house -- either to go to the clinic or go to [employer]. He did not care to take my number down but said he was sure he could get help in the clinic to find his way to me should he wish to talk in the future and expressed gratitude for my reaching out to him.    Plan: Remain available to talk with Bobby at his initiative.      Chaplain Esperanza Burns D.Min., [facility]    [phone] (direct line with voicemail)  [phone] (pager)    "

## 2018-06-04 ENCOUNTER — CARE COORDINATION (OUTPATIENT)
Dept: ONCOLOGY | Facility: CLINIC | Age: 83
End: 2018-06-04

## 2018-06-04 NOTE — PROGRESS NOTES
Date of death 5/11/2018    All appointments, orders and treatment plans cancelled.  Care TEAM and HIM notified

## 2019-01-01 NOTE — PROGRESS NOTES
Infusion Nursing Note:  Bobby Bethea presents for D1C1 Brentuximab    Note: pt feeling well today, no new issues or concerns to report. Pt continues to have fevers but this is thought to be related to disease, no infectious symptoms noted, Dr. Modi aware. Pt has had this chemo in the past, reviewed common side effects, all questions answered. CHERI Chaidez came to infusion to discuss treatment plan with pt and wife.    Treatment Conditions:  Lab Results   Component Value Date    HGB 13.0 05/05/2017     Lab Results   Component Value Date    WBC 19.9 05/05/2017      Lab Results   Component Value Date    ANEU 17.7 05/05/2017     Lab Results   Component Value Date     05/05/2017      Lab Results   Component Value Date     05/05/2017                   Lab Results   Component Value Date    POTASSIUM 4.0 05/05/2017           Lab Results   Component Value Date    MAG 2.1 12/19/2012            Lab Results   Component Value Date    CR 0.96 05/05/2017                   Lab Results   Component Value Date    VINICIO 8.1 05/05/2017                Lab Results   Component Value Date    BILITOTAL 1.3 04/27/2017           Lab Results   Component Value Date    ALBUMIN 2.8 04/27/2017                    Lab Results   Component Value Date    ALT 16 04/27/2017           Lab Results   Component Value Date    AST 11 04/27/2017     Results reviewed, labs MET treatment parameters, ok to proceed with treatment.    Intravenous Access:  Peripheral IV placed.    Post Infusion Assessment:  Patient tolerated infusion without incident.  Blood return noted pre and post infusion.  No evidence of extravasations.  Access discontinued per protocol.    Discharge Plan:   Prescription refills given for allopurinol, compazine and ativan.  Discharge instructions reviewed with: Patient and Family.  Patient and/or family verbalized understanding of discharge instructions and all questions answered.  Copy of AVS reviewed with patient  and/or family.  Patient will return 6/1 for next appointment.  Patient discharged in stable condition accompanied by: self and wife.    Josefina Blevins RN       2.885

## 2019-01-15 NOTE — PROGRESS NOTES
DATE OF VISIT :6/22/2017    HISTORY OF PRESENT ILLNESS :  Mr. 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  He has had 3 treatments for the extra cutaneous CD30-positive disease, including -- 12/1998 ([facility] - good response), 2012 (relapse treated with CVP - mixed response), and then 8 cycles of brentuximab vedotin completed on 05/21/2013 . He had a good clinical response including in the nodes and skin with Bretuximab therapy .Until recently, he has had no evidence of recurrent dorothea disease since the brentuximab. However, in early 03/2017 he began to have fevers and sweats, and a 3-4 cm node in the left inguinal region and a cutaneous nodule above his left knee. An abdominal CT showed new enlarged lymph nodes in the left iliac and inguinofemoral regions. He was seen again on 03/30/2017 to review the results and obtain a FNA of a left groin node, which was positive for CD30+ lymphoma.He started Brentuximab on 5/5/2017. Cycle 1 was associated with rash. Cycle #2 was done on 6/1/2017 at which time he had EKG for shortness of breath. EKG revealed PVC's. He follows up today.    INTERVAL HISTORY : His rash post cycle#1 brentuximab has now resolved. He feels his mass in left groin region has decreased in size. Has fatigue, continues to have issues with depression, but feels he is able to manage it himself. Has shortness of breath, but it has been stable.Denies any falls. He has been eating ok. He feels his groin lymph node has decreased in size.No abdominal pain, nausea, vomiting, diarrhoea. No fevers, chills, night sweats like before. No bleeding or bruising.Denies any cough, phlegmn, chest pain.He has been losing some hair recently from therapy.    REVIEW OF SYSTEMS:  A 12-point review of systems is otherwise generally negative.      ALLERGIES:  Atenolol/bee venom.       MEDICATIONS:   1.  Hydrocortisone cream (uses  Note dated January 11, 2019 nurse practitioner Ia a: Lungs.  Patient is due a March 2019 right screening mammogram at [facility].  6 month office visit or sooner if needed.   occasionally).   2.  Losartan.   3.  Acetaminophen p.r.n.   4.  Magnesium.   5.  Vitamin D.   6.  Aspirin 81 mg.   7.  Multivitamins.   8.  Finasteride.   9.  Vitamin C.      PHYSICAL EXAMINATION:   VITAL SIGNS:  /70  Pulse 80  Temp 99.4  F (37.4  C)  Resp 16  Wt 67.8 kg (149 lb 8 oz)  SpO2 95%  BMI 24.14 kg/m2   GEN: not in distress.  HEENT:  Atraumatic, normocephalic, mucous membranes moist, conjunctiva normal.   LYMPH:   No cervical, axillary adenopathy   CHEST:  Bilateral air entry, no wheeze, no crackles.  HEART:  Regular rate rhythm.  No murmur.   ABDOMEN:  Soft non tender, not distended, no hepatosplenomegaly.  No right inguinal nodes. Few small 1 cm left inguinal nodes and a softer 4 cm left femoral node.  EXTREMITIES:  No pedal edema.   SKIN:  No rash or bruise  NEUROLOGY : moves all extremities, strength equal.     LABORATORY DATA :      Hb 13.0, platelets 219,000, WBC 7300 (79% neutrophils, 11% lymphocytes)  Electrolyte,  creatinine (0.90) - normal.Calcium 8.4    3/30/2017: Lymph node, left femoral, fine needle aspiration:  Atypical cells present, suspicious for recurrent/persistent involvement by lymphoma     ASSESSMENT AND PLAN:  H/O anaplastic large cell lymphoma ( ALK-negative , CD30-positive) - recurrence based on symptoms, new CT finding confirming clinical exam, and evidence from FNA.     He was initiated on brentuximab on 5/5/2017. Cycle#1 was associated with rash which has now resolved. At the time of second cycle(6/1/2017) he had complaints of shortness of breath which is stable now.Overall there has been improvement in symptoms and size of lymph node in groin with minimal side effects including hair loss. Plan for cycle #3 next week and follow up with mid level provider with labs in 5 weeks for next cycle.    Patient seen and plan discussed with Dr Modi.     SADIA Brock, MS.  Hematology/Oncology Fellow  [facility]  Pager [phone]    Oncology  Attending    Patient seen and examined with the Oncology Fellow, Dr. Hdz. Agree with her findings, assessment and plan.    Eddie Modi MD  Professor of Medicine  Oncology  [facility]  Office: [phone]  Clinic Fax: [phone]      CC:    MD Grey Sanchez MD

## 2020-10-02 NOTE — TELEPHONE ENCOUNTER
Oncology Distress Screening Follow-up  Clinical Social Work  [facility]    Identified Concern and Score From Distress Screening: How concerned are you about feeling depressed or very sad? : 9    Date of Distress Screenin17    Data: Pt is 88 year old male receiving care for his T-cell lymphoma    Intervention: Call was placed to the pt regarding his screen, the pt shared that he has struggled with depression throughout his life. He recently got a new prescription to help manage his depression from his PCP, but started getting a rash so he stopped taking it until they could confirm that the rash was from the new medications. He does feel that medication has helped manage his symptoms in the past. The pt has never tried counseling or other techniques to help manage his depression and at this time does not feel that he would be interested in trying anything else as he feels his depression is manage well. SW introduces the role of the ONC  and support that we could help provided the pt should he be interested in the future.     Education Provided: depression management/ support resources    Follow-up Required: Pt will call if he has any other questions or concerns.       CHINO Williamson, [facility]  Phone [phone]  Pager [phone]       Atypical CP  Reviewed prior stress test in 9/2020 no evidence of ischemia on EKG but notable  large, severe defects in the mid to distal anterior wall, distal inferior wall  and apex that are fixed, suggestive of infarct.  CE negative  Monitor on tele  cardiology consult appreciated - plan for cath today. if negative, will discharge
